# Patient Record
Sex: FEMALE | Race: WHITE | Employment: FULL TIME | URBAN - METROPOLITAN AREA
[De-identification: names, ages, dates, MRNs, and addresses within clinical notes are randomized per-mention and may not be internally consistent; named-entity substitution may affect disease eponyms.]

---

## 2018-05-23 ENCOUNTER — OFFICE VISIT (OUTPATIENT)
Dept: ORTHOPEDIC SURGERY | Age: 58
End: 2018-05-23

## 2018-05-23 VITALS
WEIGHT: 190.04 LBS | BODY MASS INDEX: 32.44 KG/M2 | HEART RATE: 89 BPM | HEIGHT: 64 IN | DIASTOLIC BLOOD PRESSURE: 101 MMHG | SYSTOLIC BLOOD PRESSURE: 187 MMHG

## 2018-05-23 DIAGNOSIS — M54.16 LUMBAR RADICULOPATHY: ICD-10-CM

## 2018-05-23 DIAGNOSIS — M54.5 LOW BACK PAIN, UNSPECIFIED BACK PAIN LATERALITY, UNSPECIFIED CHRONICITY, WITH SCIATICA PRESENCE UNSPECIFIED: Primary | ICD-10-CM

## 2018-05-23 DIAGNOSIS — M43.16 SPONDYLOLISTHESIS AT L4-L5 LEVEL: ICD-10-CM

## 2018-05-23 PROCEDURE — 99203 OFFICE O/P NEW LOW 30 MIN: CPT | Performed by: PHYSICAL MEDICINE & REHABILITATION

## 2018-05-23 RX ORDER — PREDNISONE 10 MG/1
TABLET ORAL
Qty: 26 TABLET | Refills: 0 | Status: SHIPPED | OUTPATIENT
Start: 2018-05-23 | End: 2020-08-07

## 2019-05-30 ENCOUNTER — OFFICE VISIT (OUTPATIENT)
Dept: ORTHOPEDIC SURGERY | Age: 59
End: 2019-05-30
Payer: COMMERCIAL

## 2019-05-30 VITALS
WEIGHT: 190.04 LBS | BODY MASS INDEX: 32.44 KG/M2 | DIASTOLIC BLOOD PRESSURE: 85 MMHG | HEART RATE: 79 BPM | SYSTOLIC BLOOD PRESSURE: 135 MMHG | HEIGHT: 64 IN

## 2019-05-30 DIAGNOSIS — M79.672 FOOT PAIN, LEFT: ICD-10-CM

## 2019-05-30 DIAGNOSIS — M76.72 PERONEAL TENDINITIS OF LEFT LOWER EXTREMITY: Primary | ICD-10-CM

## 2019-05-30 PROCEDURE — L4361 PNEUMA/VAC WALK BOOT PRE OTS: HCPCS | Performed by: PHYSICIAN ASSISTANT

## 2019-05-30 PROCEDURE — 99203 OFFICE O/P NEW LOW 30 MIN: CPT | Performed by: PHYSICIAN ASSISTANT

## 2019-05-30 NOTE — PROGRESS NOTES
Chief Complaint    Foot Pain (LEFT FOOT. FELL DOWN BASEMENT STEPS ABOUT 6-8 MONTHS AGO. FELT OK AT THAT POINT BUT NOW PAIN HAS INCREASED. LATERAL SIDED.)      History of Present Illness:  Hanane Aguila is a 61 y.o. femalehere for evaluation chief complaint of left lateral foot pain. Patient reports that approximately 6-8 months ago she stumbled down some steps and twisted her left ankle but really did not complain of a lot of pain or discomfort at that time. About 6-8 weeks later she began noticing pain and swelling over the lateral aspect of her foot. She works as a  at [employer] and notes pain that increases throughout the day. She also notices pain and stiffness when she first gets up in the morning. The symptoms have been getting progressively worse over the course the last several months. She's taking some Excedrin but has not had any conservative treatment. Pain Assessment  Location of Pain: Foot  Location Modifiers: Left  Severity of Pain: 5  Quality of Pain: Dull, Aching, Sharp  Duration of Pain: A few hours  Frequency of Pain: Several times daily  Aggravating Factors: Bending, Stretching, Stairs, Walking  Limiting Behavior: Some  Relieving Factors: Ice, Rest  Result of Injury: Yes  Work-Related Injury: No  Are there other pain locations you wish to document?: No]      Medical History:  Patient's medications, allergies, past medical, surgical, social and family histories were reviewed and updated as appropriate. Review of Systems:  Pertinent items are noted in HPI  Review of systems reviewed from Patient History Form dated on 5/30/2019 and available in the patient's chart under the Media tab. Vital Signs:  /85   Pulse 79   Ht 5' 4.02\" (1.626 m)   Wt 190 lb 0.6 oz (86.2 kg)   BMI 32.60 kg/m²     General Exam:   Constitutional: Patient is adequately groomed with no evidence of malnutrition  DTRs: Deep tendon reflexes are intact  Mental Status:  The patient is oriented to time, place and person. The patient's mood and affect are appropriate. Lymphatic: The lymphatic examination bilaterally reveals all areas to be without enlargement or induration. Vascular: Examination reveals no swelling or calf tenderness. Peripheral pulses are palpable and 2+. Neurological: The patient has good coordination. There is no weakness or sensory deficit. Left Foot Examination:    Inspection:  No ecchymosis or deformity. There is some swelling at the base of the fifth metatarsal.    Palpation:  Tender palpation directly over the base of the fifth metatarsal.  Nontender over the fibula or ATFL    Range of Motion:  Full range of motion of the foot and ankle    Strength:  5/5 strength throughout    Special Tests:  Negative anterior drawer and negative talar tilt    Skin: There are no rashes, ulcerations or lesions. Gait: Antalgic    Reflex 2+ and symmetric    Additional Comments:       Additional Examinations:         Right Lower Extremity: Examination of the right lower extremity does not show any tenderness, deformity or injury. Range of motion is unremarkable. There is no gross instability. There are no rashes, ulcerations or lesions. Strength and tone are normal.    Radiology:     X-rays obtained and reviewed in office:  Views 3 views including AP, lateral, oblique  Location left foot  Impression no evidence of any acute fractures or dislocations. No periosteal reaction or callus to suggest remote injury. Impression:  Encounter Diagnoses   Name Primary?  Foot pain, left     Peroneal tendinitis of left lower extremity Yes       Office Procedures:  Orders Placed This Encounter   Procedures    XR FOOT LEFT (MIN 3 VIEWS)     Standing Status:   Future     Number of Occurrences:   1     Standing Expiration Date:   6/30/2019    OTS FP Pneumatic Walking Boot Tall DJO     Patient was prescribed a Sofi Florentino Tall Walking Boot.   The left FOOT will require stabilization / immobilization from this semi-rigid / rigid orthosis to improve their function. The orthosis will assist in protecting the affected area, provide functional support and facilitate healing. The patient was educated and fit by a healthcare professional with expert knowledge and specialization in brace application while under the direct supervision of the physician. Verbal and written instructions for the use of and application of this item were provided. They were instructed to contact the office immediately should the brace result in increased pain, decreased sensation, increased swelling or worsening of the condition. Treatment Plan:  The etiology of peroneal tendinitis and it's appropriate treatment were discussed in great detail. I recommend placing her into a pneumatic walking but this evening and recommend she continue with ice. We'll also recommend oral anti-inflammatory medications over-the-counter.   I'll have her follow up in 2-3 weeks for repeat clinical exam and reevaluation

## 2019-05-31 ENCOUNTER — TELEPHONE (OUTPATIENT)
Dept: ORTHOPEDIC SURGERY | Age: 59
End: 2019-05-31

## 2019-06-11 ENCOUNTER — OFFICE VISIT (OUTPATIENT)
Dept: ORTHOPEDIC SURGERY | Age: 59
End: 2019-06-11
Payer: COMMERCIAL

## 2019-06-11 VITALS
HEART RATE: 87 BPM | HEIGHT: 64 IN | WEIGHT: 190.04 LBS | BODY MASS INDEX: 32.44 KG/M2 | DIASTOLIC BLOOD PRESSURE: 74 MMHG | SYSTOLIC BLOOD PRESSURE: 131 MMHG

## 2019-06-11 DIAGNOSIS — M79.672 LEFT FOOT PAIN: Primary | ICD-10-CM

## 2019-06-11 PROCEDURE — 20550 NJX 1 TENDON SHEATH/LIGAMENT: CPT | Performed by: PODIATRIST

## 2019-06-11 PROCEDURE — 99203 OFFICE O/P NEW LOW 30 MIN: CPT | Performed by: PODIATRIST

## 2019-06-11 NOTE — PROGRESS NOTES
HISTORY OF PRESENT ILLNESS:  This is an initial visit for a a 26-year-old female with a chief complaint of pain to the side of the left midfoot. She has had pain for about 8 months. Approximately 9 months ago she fell down some steps however at that time she did not have any foot pain after that. The pain developed without any new injury about a month after that injury. The pain is fairly sharp and described as burning at times with both activity and direct pressure. The only real relief is experienced with rest and taking off shoes. She has been immobilized in a boot for several months but that has not been helping. FAMILY HISTORY: Documented in chart. SOCIAL HISTORY: Documented in chart. REVIEW OF SYSTEMS: The patient denies any problems with cardiovascular, pulmonary, gastrointestinal, neurologic, urologic, genitourinary, psychiatric, dermatologic, and HEENT systems. Family History, Social History, and Review of Systems were reviewed from patient history form dated on 6/11/2019 and available in the patient's chart under the MEDIA tab. PHYSICAL EXAM:  The majority of the palpable tenderness is at the lateral aspect of the left mid foot, over the 5th metatarsal base. There is minimal pain of the peroneal tendon at the posterior lateral aspect of the ankle, bilateral.  There is full strength with eversion and plantarflexion of the foot, bilateral.  There is no pain with palpation to the Achilles tendon, bilateral.  There is full-strength with plantar flexion at the ankle. This is symmetrical.    Pedal pulses are palpable, bilateral.  The sensation is grossly intact, bilateral.    X-RAYS: 3 nonweightbearing x-ray views of the left foot were reviewed. No acute fracture or periosteal reaction is noted on either side. ASSESSMENT: Peroneal Tendinitis, left    PLAN:  I educated the patient on the pathology and its treatment options.     I injected a mixture of 1 mL of 0.5% bupivacaine plain and 0.5mL of Celestone (6mg/ml) via a lateral approach to the peritenon around the insertion of the peroneus brevis tendon. The area of injection was sterilely prepped prior to the injection. The patient tolerated the injection very well. I advised to the patient to rest and ice the area for the next 48 hours. The potential for a steroid flare reaction was discussed. She will use the boot for at least another week. A gradual return to activity and regular shoe gear can be done if symptoms subside. I will see her back in 3 weeks.

## 2020-06-22 ENCOUNTER — TELEPHONE (OUTPATIENT)
Dept: FAMILY MEDICINE CLINIC | Age: 60
End: 2020-06-22

## 2020-08-05 ENCOUNTER — OFFICE VISIT (OUTPATIENT)
Dept: FAMILY MEDICINE CLINIC | Age: 60
End: 2020-08-05
Payer: COMMERCIAL

## 2020-08-05 VITALS
BODY MASS INDEX: 34.83 KG/M2 | OXYGEN SATURATION: 97 % | SYSTOLIC BLOOD PRESSURE: 200 MMHG | TEMPERATURE: 98 F | WEIGHT: 203 LBS | DIASTOLIC BLOOD PRESSURE: 120 MMHG | HEART RATE: 88 BPM

## 2020-08-05 PROCEDURE — 99406 BEHAV CHNG SMOKING 3-10 MIN: CPT | Performed by: FAMILY MEDICINE

## 2020-08-05 PROCEDURE — 99204 OFFICE O/P NEW MOD 45 MIN: CPT | Performed by: FAMILY MEDICINE

## 2020-08-05 RX ORDER — EPINEPHRINE 0.3 MG/.3ML
0.3 INJECTION SUBCUTANEOUS ONCE
Qty: 0.3 ML | Refills: 1 | Status: SHIPPED | OUTPATIENT
Start: 2020-08-05 | End: 2021-01-01

## 2020-08-05 RX ORDER — HYDROCHLOROTHIAZIDE 25 MG/1
25 TABLET ORAL EVERY MORNING
Qty: 30 TABLET | Refills: 3 | Status: SHIPPED | OUTPATIENT
Start: 2020-08-05 | End: 2021-01-01 | Stop reason: SDDI

## 2020-08-05 ASSESSMENT — PATIENT HEALTH QUESTIONNAIRE - PHQ9
SUM OF ALL RESPONSES TO PHQ9 QUESTIONS 1 & 2: 2
2. FEELING DOWN, DEPRESSED OR HOPELESS: 1
1. LITTLE INTEREST OR PLEASURE IN DOING THINGS: 1
SUM OF ALL RESPONSES TO PHQ QUESTIONS 1-9: 2
SUM OF ALL RESPONSES TO PHQ QUESTIONS 1-9: 2

## 2020-08-05 ASSESSMENT — ENCOUNTER SYMPTOMS
CHEST TIGHTNESS: 0
SHORTNESS OF BREATH: 0

## 2020-08-05 NOTE — PROGRESS NOTES
2020    Reid Silver (:  1960) veena 61 y.o. female, here for evaluation of the following:    CC: tobacco abuse    HPI    Tobacco abuse  1/2 ppd    HTN  Above goal, not currently not on anything, has been 5 years since seeing a doctor    Obesity  BMI of 34    Health maint  Hasn't done mammo, colonoscopy, pap, or LDCT    Review of Systems   Constitutional: Negative for fever. Eyes: Negative for visual disturbance. Respiratory: Negative for chest tightness and shortness of breath. Cardiovascular: Negative for chest pain. All other systems reviewed and negative    Prior to Visit Medications    Medication Sig Taking?  Authorizing Provider   hydroCHLOROthiazide (HYDRODIURIL) 25 MG tablet Take 1 tablet by mouth every morning Yes Jonathan Argueta MD   EPINEPHrine (EPIPEN 2-LEONA) 0.3 MG/0.3ML SOAJ injection Inject 0.3 mLs into the muscle once for 1 dose Use as directed for allergic reaction Yes Jonathan Argueta MD   predniSONE (DELTASONE) 10 MG tablet SIG: iii po BID x 2 days then ii po BID x 2 days then i po BID x 2 days then i po qd x 2 days  F Jose Rodriguez MD   omeprazole (PRILOSEC) 20 MG capsule Take 20 mg by mouth daily  Historical Provider, MD   lisinopril (PRINIVIL;ZESTRIL) 20 MG tablet Take 20 mg by mouth daily  Historical Provider, MD   EPINEPHrine (EPIPEN) 0.3 MG/0.3ML THANG injection Use as directed for allergic reaction  Yanira Coello PA-C        Allergies   Allergen Reactions    Bee Pollen Swelling    Varenicline Nausea And Vomiting       Past Medical History:   Diagnosis Date    Hypertension        Past Surgical History:   Procedure Laterality Date    CHOLECYSTECTOMY      HYSTERECTOMY         Social History     Socioeconomic History    Marital status: Single     Spouse name: Not on file    Number of children: Not on file    Years of education: Not on file    Highest education level: Not on file   Occupational History    Not on file   Social Needs    Financial resource strain: Not on file    Food insecurity     Worry: Not on file     Inability: Not on file    Transportation needs     Medical: Not on file     Non-medical: Not on file   Tobacco Use    Smoking status: Current Every Day Smoker     Packs/day: 0.50     Types: Cigarettes     Start date: 36    Smokeless tobacco: Never Used   Substance and Sexual Activity    Alcohol use: No    Drug use: No    Sexual activity: Not on file   Lifestyle    Physical activity     Days per week: Not on file     Minutes per session: Not on file    Stress: Not on file   Relationships    Social connections     Talks on phone: Not on file     Gets together: Not on file     Attends Confucianism service: Not on file     Active member of club or organization: Not on file     Attends meetings of clubs or organizations: Not on file     Relationship status: Not on file    Intimate partner violence     Fear of current or ex partner: Not on file     Emotionally abused: Not on file     Physically abused: Not on file     Forced sexual activity: Not on file   Other Topics Concern    Not on file   Social History Narrative    Not on file        History reviewed. No pertinent family history. Vitals:    08/05/20 1324   BP: (!) 200/120   Pulse: 88   Temp: 98 °F (36.7 °C)   TempSrc: Temporal   SpO2: 97%   Weight: 203 lb (92.1 kg)     Estimated body mass index is 34.83 kg/m² as calculated from the following:    Height as of 6/11/19: 5' 4.02\" (1.626 m). Weight as of this encounter: 203 lb (92.1 kg). Physical Exam  Neurological:      General: No focal deficit present. Cranial Nerves: No cranial nerve deficit. Sensory: No sensory deficit. Motor: No weakness.       Deep Tendon Reflexes: Reflexes normal.     Constitutional: Patient appears well-developed and well-nourished   Ears, Nose, Mouth & Throat: external inspection of ears and nose show no scars, lesions or masses, pt can hear finger rub bilaterally  Eyes: Conjunctivae and pupils are normal in appearance   Neck: neck is supple. No thyromegaly present   Cardiovascular: Normal rate. No lower ext edema present  Respiratory: Effort normal and breath sounds normal. No respiratory distress. No W/R/R   Gastrointestinal: Soft. There is no tenderness. No hernias  Musculoskeletal: Normal range of motion of extremities, normal gait  Psychiatric: judgment and insight appropriate for age, no agitation  Skin: Skin is warm and dry     ASSESSMENT/PLAN:  Yandy Layton was seen today for established new doctor and numbness. Diagnoses and all orders for this visit:    Tobacco abuse  Tobacco Abuse: 1/2 ppd. Pt counseled on risks of tobacco use for 3 min and urged to quit. Patient in pre-contemplative stage. Will discuss with patient on future visit    Essential hypertension  Uncontrolled, asymmptomtic, pt to start HCTZ today and schedule appt for 2 days from now to check BP, If symptoms worsen or fail to improve the patient will contact office  -     hydroCHLOROthiazide (HYDRODIURIL) 25 MG tablet; Take 1 tablet by mouth every morning    Obesity, unspecified classification, unspecified obesity type, unspecified whether serious comorbidity present  Diet, exercise    Healthcare maintenance  Declines all health screenings, immunizations, etc  -     Comprehensive Metabolic Panel  -     Hemoglobin A1C  -     Lipid Panel    Return in about 2 days (around 8/7/2020) for bp check. An  electronic signature was used to authenticate this note.     --Luis Miguel Larsen MD on 8/5/2020 at 1:58 PM

## 2020-08-06 LAB
A/G RATIO: 1.4 (ref 1.1–2.2)
ALBUMIN SERPL-MCNC: 4 G/DL (ref 3.4–5)
ALP BLD-CCNC: 110 U/L (ref 40–129)
ALT SERPL-CCNC: 8 U/L (ref 10–40)
ANION GAP SERPL CALCULATED.3IONS-SCNC: 18 MMOL/L (ref 3–16)
AST SERPL-CCNC: 16 U/L (ref 15–37)
BILIRUB SERPL-MCNC: 0.4 MG/DL (ref 0–1)
BUN BLDV-MCNC: 8 MG/DL (ref 7–20)
CALCIUM SERPL-MCNC: 9 MG/DL (ref 8.3–10.6)
CHLORIDE BLD-SCNC: 98 MMOL/L (ref 99–110)
CHOLESTEROL, TOTAL: 201 MG/DL (ref 0–199)
CO2: 27 MMOL/L (ref 21–32)
CREAT SERPL-MCNC: 0.9 MG/DL (ref 0.6–1.2)
ESTIMATED AVERAGE GLUCOSE: 122.6 MG/DL
GFR AFRICAN AMERICAN: >60
GFR NON-AFRICAN AMERICAN: >60
GLOBULIN: 2.9 G/DL
GLUCOSE BLD-MCNC: 130 MG/DL (ref 70–99)
HBA1C MFR BLD: 5.9 %
HDLC SERPL-MCNC: 35 MG/DL (ref 40–60)
LDL CHOLESTEROL CALCULATED: 124 MG/DL
POTASSIUM SERPL-SCNC: 3.2 MMOL/L (ref 3.5–5.1)
SODIUM BLD-SCNC: 143 MMOL/L (ref 136–145)
TOTAL PROTEIN: 6.9 G/DL (ref 6.4–8.2)
TRIGL SERPL-MCNC: 212 MG/DL (ref 0–150)
VLDLC SERPL CALC-MCNC: 42 MG/DL

## 2020-08-07 ENCOUNTER — OFFICE VISIT (OUTPATIENT)
Dept: FAMILY MEDICINE CLINIC | Age: 60
End: 2020-08-07
Payer: COMMERCIAL

## 2020-08-07 VITALS
RESPIRATION RATE: 16 BRPM | DIASTOLIC BLOOD PRESSURE: 88 MMHG | HEIGHT: 64 IN | SYSTOLIC BLOOD PRESSURE: 182 MMHG | HEART RATE: 94 BPM | TEMPERATURE: 98.1 F | WEIGHT: 204.6 LBS | OXYGEN SATURATION: 97 % | BODY MASS INDEX: 34.93 KG/M2

## 2020-08-07 PROCEDURE — 99214 OFFICE O/P EST MOD 30 MIN: CPT | Performed by: FAMILY MEDICINE

## 2020-08-07 RX ORDER — LISINOPRIL 20 MG/1
20 TABLET ORAL DAILY
Qty: 30 TABLET | Refills: 0 | Status: SHIPPED | OUTPATIENT
Start: 2020-08-07 | End: 2020-08-07

## 2020-08-07 RX ORDER — CYCLOBENZAPRINE HCL 5 MG
5 TABLET ORAL NIGHTLY PRN
Qty: 20 TABLET | Refills: 0 | Status: SHIPPED | OUTPATIENT
Start: 2020-08-07 | End: 2020-08-27

## 2020-08-07 RX ORDER — LISINOPRIL 20 MG/1
20 TABLET ORAL DAILY
Qty: 30 TABLET | Refills: 2 | Status: SHIPPED | OUTPATIENT
Start: 2020-08-07 | End: 2021-01-01 | Stop reason: SDDI

## 2020-08-07 ASSESSMENT — ENCOUNTER SYMPTOMS: SHORTNESS OF BREATH: 0

## 2020-08-07 NOTE — PROGRESS NOTES
(1.626 m)   Wt 204 lb 9.6 oz (92.8 kg)   SpO2 97%   BMI 35.12 kg/m²      Physical Exam  Vitals signs reviewed. Constitutional:       Appearance: Normal appearance. HENT:      Head: Normocephalic and atraumatic. Eyes:      Conjunctiva/sclera: Conjunctivae normal.   Cardiovascular:      Rate and Rhythm: Normal rate and regular rhythm. Heart sounds: Normal heart sounds. Pulmonary:      Effort: Pulmonary effort is normal.      Breath sounds: Normal breath sounds. Musculoskeletal:      Comments: No tenderness to palpation thoracic spine. Tenderness palpation muscular area just beneath the right scapula. Skin:     General: Skin is warm and dry. Neurological:      General: No focal deficit present. Mental Status: She is alert and oriented to person, place, and time. ASSESSMENT:   Well Adult, See encounter diagnoses  Dwayne Quintana was seen today for blood pressure check. Diagnoses and all orders for this visit:    Essential hypertension  Advised patient to continue the hydrochlorothiazide 5 blood pressure monitor and if blood pressure is still greater than 140/90 on Sunday to go ahead and start lisinopril 20 mg. Take both medications and follow-up in 1 month with her PCP. Monitor blood pressures and notify us if greater than 140/90 sooner. Patient may need 24-hour blood pressure monitor to evaluate for whitecoat hypertension. Reviewed red flags with patient if they occur advised to go to the ER.  -     lisinopril (PRINIVIL;ZESTRIL) 20 MG tablet; Take 1 tablet by mouth daily    Muscle spasm  Continue with ice as needed can also use topical creams such as icy hot or salonpas  -     cyclobenzaprine (FLEXERIL) 5 MG tablet; Take 1 tablet by mouth nightly as needed for Muscle spasms          Plan:   See orders and medications filed with this encounter. Return in about 4 weeks (around 9/4/2020).

## 2021-01-01 ENCOUNTER — OFFICE VISIT (OUTPATIENT)
Dept: FAMILY MEDICINE CLINIC | Age: 61
End: 2021-01-01
Payer: COMMERCIAL

## 2021-01-01 ENCOUNTER — OFFICE VISIT (OUTPATIENT)
Dept: CARDIOLOGY CLINIC | Age: 61
End: 2021-01-01
Payer: COMMERCIAL

## 2021-01-01 ENCOUNTER — HOSPITAL ENCOUNTER (INPATIENT)
Age: 61
LOS: 3 days | Discharge: HOME | DRG: 304 | End: 2021-03-04
Attending: EMERGENCY MEDICINE | Admitting: HOSPITALIST
Payer: COMMERCIAL

## 2021-01-01 ENCOUNTER — TELEPHONE (OUTPATIENT)
Dept: CARDIOLOGY CLINIC | Age: 61
End: 2021-01-01

## 2021-01-01 ENCOUNTER — NURSE TRIAGE (OUTPATIENT)
Dept: OTHER | Facility: CLINIC | Age: 61
End: 2021-01-01

## 2021-01-01 ENCOUNTER — TELEPHONE (OUTPATIENT)
Dept: FAMILY MEDICINE CLINIC | Age: 61
End: 2021-01-01

## 2021-01-01 ENCOUNTER — APPOINTMENT (OUTPATIENT)
Dept: GENERAL RADIOLOGY | Age: 61
DRG: 045 | End: 2021-01-01
Payer: MEDICAID

## 2021-01-01 ENCOUNTER — APPOINTMENT (OUTPATIENT)
Dept: GENERAL RADIOLOGY | Age: 61
DRG: 052 | End: 2021-01-01
Attending: INTERNAL MEDICINE
Payer: MEDICAID

## 2021-01-01 ENCOUNTER — APPOINTMENT (OUTPATIENT)
Dept: GENERAL RADIOLOGY | Age: 61
DRG: 304 | End: 2021-01-01
Payer: COMMERCIAL

## 2021-01-01 ENCOUNTER — HOSPITAL ENCOUNTER (INPATIENT)
Age: 61
LOS: 6 days | DRG: 052 | End: 2021-10-17
Attending: INTERNAL MEDICINE | Admitting: INTERNAL MEDICINE
Payer: MEDICAID

## 2021-01-01 ENCOUNTER — APPOINTMENT (OUTPATIENT)
Dept: CT IMAGING | Age: 61
DRG: 045 | End: 2021-01-01
Payer: MEDICAID

## 2021-01-01 ENCOUNTER — HOSPITAL ENCOUNTER (INPATIENT)
Age: 61
LOS: 9 days | Discharge: ANOTHER HOSPITAL | DRG: 045 | End: 2021-10-11
Attending: EMERGENCY MEDICINE | Admitting: INTERNAL MEDICINE
Payer: MEDICAID

## 2021-01-01 ENCOUNTER — APPOINTMENT (OUTPATIENT)
Dept: MRI IMAGING | Age: 61
DRG: 045 | End: 2021-01-01
Payer: MEDICAID

## 2021-01-01 ENCOUNTER — APPOINTMENT (OUTPATIENT)
Dept: MRI IMAGING | Age: 61
DRG: 304 | End: 2021-01-01
Payer: COMMERCIAL

## 2021-01-01 ENCOUNTER — APPOINTMENT (OUTPATIENT)
Dept: CT IMAGING | Age: 61
DRG: 304 | End: 2021-01-01
Payer: COMMERCIAL

## 2021-01-01 ENCOUNTER — APPOINTMENT (OUTPATIENT)
Dept: CT IMAGING | Age: 61
DRG: 052 | End: 2021-01-01
Attending: INTERNAL MEDICINE
Payer: MEDICAID

## 2021-01-01 ENCOUNTER — APPOINTMENT (OUTPATIENT)
Dept: NUCLEAR MEDICINE | Age: 61
DRG: 304 | End: 2021-01-01
Payer: COMMERCIAL

## 2021-01-01 ENCOUNTER — APPOINTMENT (OUTPATIENT)
Dept: VASCULAR LAB | Age: 61
DRG: 304 | End: 2021-01-01
Payer: COMMERCIAL

## 2021-01-01 ENCOUNTER — APPOINTMENT (OUTPATIENT)
Dept: MRI IMAGING | Age: 61
DRG: 052 | End: 2021-01-01
Attending: INTERNAL MEDICINE
Payer: MEDICAID

## 2021-01-01 VITALS
BODY MASS INDEX: 33.2 KG/M2 | HEART RATE: 73 BPM | OXYGEN SATURATION: 98 % | DIASTOLIC BLOOD PRESSURE: 76 MMHG | WEIGHT: 193.4 LBS | SYSTOLIC BLOOD PRESSURE: 138 MMHG | TEMPERATURE: 97.8 F

## 2021-01-01 VITALS
RESPIRATION RATE: 16 BRPM | OXYGEN SATURATION: 98 % | HEART RATE: 58 BPM | TEMPERATURE: 97.8 F | SYSTOLIC BLOOD PRESSURE: 153 MMHG | BODY MASS INDEX: 33.29 KG/M2 | HEIGHT: 64 IN | DIASTOLIC BLOOD PRESSURE: 70 MMHG | WEIGHT: 195 LBS

## 2021-01-01 VITALS
HEART RATE: 74 BPM | OXYGEN SATURATION: 98 % | WEIGHT: 187 LBS | HEIGHT: 64 IN | BODY MASS INDEX: 31.92 KG/M2 | SYSTOLIC BLOOD PRESSURE: 152 MMHG | DIASTOLIC BLOOD PRESSURE: 78 MMHG

## 2021-01-01 VITALS
BODY MASS INDEX: 33.47 KG/M2 | DIASTOLIC BLOOD PRESSURE: 144 MMHG | WEIGHT: 195 LBS | OXYGEN SATURATION: 98 % | SYSTOLIC BLOOD PRESSURE: 266 MMHG | HEART RATE: 95 BPM | TEMPERATURE: 97.3 F

## 2021-01-01 VITALS
HEIGHT: 64 IN | DIASTOLIC BLOOD PRESSURE: 76 MMHG | BODY MASS INDEX: 32.69 KG/M2 | HEART RATE: 83 BPM | OXYGEN SATURATION: 93 % | SYSTOLIC BLOOD PRESSURE: 134 MMHG | WEIGHT: 191.5 LBS

## 2021-01-01 VITALS
BODY MASS INDEX: 33.29 KG/M2 | SYSTOLIC BLOOD PRESSURE: 131 MMHG | TEMPERATURE: 97.7 F | HEIGHT: 64 IN | RESPIRATION RATE: 18 BRPM | HEART RATE: 71 BPM | WEIGHT: 195 LBS | DIASTOLIC BLOOD PRESSURE: 74 MMHG | OXYGEN SATURATION: 99 %

## 2021-01-01 VITALS
TEMPERATURE: 97.5 F | WEIGHT: 189 LBS | OXYGEN SATURATION: 98 % | DIASTOLIC BLOOD PRESSURE: 80 MMHG | BODY MASS INDEX: 32.44 KG/M2 | HEART RATE: 75 BPM | SYSTOLIC BLOOD PRESSURE: 138 MMHG

## 2021-01-01 VITALS
OXYGEN SATURATION: 100 % | BODY MASS INDEX: 27.74 KG/M2 | RESPIRATION RATE: 32 BRPM | SYSTOLIC BLOOD PRESSURE: 82 MMHG | WEIGHT: 162.48 LBS | HEART RATE: 89 BPM | TEMPERATURE: 98.4 F | DIASTOLIC BLOOD PRESSURE: 74 MMHG | HEIGHT: 64 IN

## 2021-01-01 VITALS
OXYGEN SATURATION: 98 % | BODY MASS INDEX: 32.68 KG/M2 | SYSTOLIC BLOOD PRESSURE: 136 MMHG | WEIGHT: 190.4 LBS | DIASTOLIC BLOOD PRESSURE: 70 MMHG | HEART RATE: 70 BPM

## 2021-01-01 VITALS
DIASTOLIC BLOOD PRESSURE: 80 MMHG | SYSTOLIC BLOOD PRESSURE: 140 MMHG | HEART RATE: 73 BPM | WEIGHT: 196 LBS | TEMPERATURE: 96.9 F | BODY MASS INDEX: 33.64 KG/M2 | OXYGEN SATURATION: 96 %

## 2021-01-01 DIAGNOSIS — I16.0 HYPERTENSIVE URGENCY, MALIGNANT: ICD-10-CM

## 2021-01-01 DIAGNOSIS — I10 HTN (HYPERTENSION), BENIGN: ICD-10-CM

## 2021-01-01 DIAGNOSIS — Z12.39 ENCOUNTER FOR SCREENING FOR MALIGNANT NEOPLASM OF BREAST, UNSPECIFIED SCREENING MODALITY: ICD-10-CM

## 2021-01-01 DIAGNOSIS — G93.40 ENCEPHALOPATHY: ICD-10-CM

## 2021-01-01 DIAGNOSIS — R55 SYNCOPE AND COLLAPSE: ICD-10-CM

## 2021-01-01 DIAGNOSIS — I63.9 CEREBROVASCULAR ACCIDENT (CVA), UNSPECIFIED MECHANISM (HCC): Primary | ICD-10-CM

## 2021-01-01 DIAGNOSIS — I10 UNCONTROLLED HYPERTENSION: ICD-10-CM

## 2021-01-01 DIAGNOSIS — F17.200 SMOKER: ICD-10-CM

## 2021-01-01 DIAGNOSIS — E04.1 THYROID NODULE: ICD-10-CM

## 2021-01-01 DIAGNOSIS — Z72.0 TOBACCO ABUSE: ICD-10-CM

## 2021-01-01 DIAGNOSIS — I63.9 CEREBROVASCULAR ACCIDENT (CVA), UNSPECIFIED MECHANISM (HCC): ICD-10-CM

## 2021-01-01 DIAGNOSIS — I10 HTN (HYPERTENSION), BENIGN: Primary | ICD-10-CM

## 2021-01-01 DIAGNOSIS — I63.9 ACUTE CVA (CEREBROVASCULAR ACCIDENT) (HCC): Primary | ICD-10-CM

## 2021-01-01 DIAGNOSIS — I16.0 HYPERTENSIVE URGENCY: ICD-10-CM

## 2021-01-01 DIAGNOSIS — E87.6 HYPOKALEMIA: ICD-10-CM

## 2021-01-01 DIAGNOSIS — R42 DIZZINESS: ICD-10-CM

## 2021-01-01 DIAGNOSIS — H53.452 PERIPHERAL VISUAL FIELD DEFECT, LEFT: ICD-10-CM

## 2021-01-01 DIAGNOSIS — H90.0 CONDUCTIVE HEARING LOSS, BILATERAL: ICD-10-CM

## 2021-01-01 DIAGNOSIS — I16.1 HYPERTENSIVE EMERGENCY: ICD-10-CM

## 2021-01-01 DIAGNOSIS — R41.3 MEMORY DIFFICULTY: ICD-10-CM

## 2021-01-01 DIAGNOSIS — R55 SYNCOPE, UNSPECIFIED SYNCOPE TYPE: ICD-10-CM

## 2021-01-01 DIAGNOSIS — I16.0 HYPERTENSIVE URGENCY: Primary | ICD-10-CM

## 2021-01-01 DIAGNOSIS — R20.2 PARESTHESIA: ICD-10-CM

## 2021-01-01 DIAGNOSIS — H61.23 BILATERAL IMPACTED CERUMEN: ICD-10-CM

## 2021-01-01 DIAGNOSIS — E83.42 HYPOMAGNESEMIA: ICD-10-CM

## 2021-01-01 DIAGNOSIS — R42 DIZZINESS: Primary | ICD-10-CM

## 2021-01-01 DIAGNOSIS — I44.1 2ND DEGREE ATRIOVENTRICULAR BLOCK: ICD-10-CM

## 2021-01-01 DIAGNOSIS — E78.5 DYSLIPIDEMIA: ICD-10-CM

## 2021-01-01 DIAGNOSIS — G93.5 BRAIN HERNIATION (HCC): ICD-10-CM

## 2021-01-01 DIAGNOSIS — I63.511 CEREBROVASCULAR ACCIDENT (CVA) DUE TO OCCLUSION OF RIGHT MIDDLE CEREBRAL ARTERY (HCC): Primary | ICD-10-CM

## 2021-01-01 DIAGNOSIS — R06.02 SOB (SHORTNESS OF BREATH): ICD-10-CM

## 2021-01-01 DIAGNOSIS — I10 ESSENTIAL HYPERTENSION: ICD-10-CM

## 2021-01-01 DIAGNOSIS — I10 UNCONTROLLED HYPERTENSION: Primary | ICD-10-CM

## 2021-01-01 DIAGNOSIS — K21.9 GASTROESOPHAGEAL REFLUX DISEASE WITHOUT ESOPHAGITIS: ICD-10-CM

## 2021-01-01 DIAGNOSIS — E78.2 MIXED HYPERLIPIDEMIA: ICD-10-CM

## 2021-01-01 DIAGNOSIS — R51.9 GENERALIZED HEADACHE: ICD-10-CM

## 2021-01-01 LAB
A/G RATIO: 1.1 (ref 1.1–2.2)
A/G RATIO: 1.2 (ref 1.1–2.2)
A/G RATIO: 1.3 (ref 1.1–2.2)
ALBUMIN SERPL-MCNC: 3.5 G/DL (ref 3.4–5)
ALBUMIN SERPL-MCNC: 3.7 G/DL (ref 3.4–5)
ALBUMIN SERPL-MCNC: 3.8 G/DL (ref 3.4–5)
ALBUMIN SERPL-MCNC: 3.8 G/DL (ref 3.4–5)
ALBUMIN SERPL-MCNC: 4.1 G/DL (ref 3.4–5)
ALBUMIN SERPL-MCNC: 4.2 G/DL (ref 3.4–5)
ALBUMIN SERPL-MCNC: 4.5 G/DL (ref 3.4–5)
ALDOSTERONE: <3 NG/DL
ALP BLD-CCNC: 110 U/L (ref 40–129)
ALP BLD-CCNC: 79 U/L (ref 40–129)
ALP BLD-CCNC: 87 U/L (ref 40–129)
ALP BLD-CCNC: 87 U/L (ref 40–129)
ALP BLD-CCNC: 88 U/L (ref 40–129)
ALT SERPL-CCNC: 7 U/L (ref 10–40)
ALT SERPL-CCNC: 7 U/L (ref 10–40)
ALT SERPL-CCNC: <5 U/L (ref 10–40)
AMPHETAMINE SCREEN, URINE: ABNORMAL
AMPHETAMINE SCREEN, URINE: ABNORMAL
ANION GAP SERPL CALCULATED.3IONS-SCNC: 10 MMOL/L (ref 3–16)
ANION GAP SERPL CALCULATED.3IONS-SCNC: 11 MMOL/L (ref 3–16)
ANION GAP SERPL CALCULATED.3IONS-SCNC: 11 MMOL/L (ref 3–16)
ANION GAP SERPL CALCULATED.3IONS-SCNC: 12 MMOL/L (ref 3–16)
ANION GAP SERPL CALCULATED.3IONS-SCNC: 13 MMOL/L (ref 3–16)
ANION GAP SERPL CALCULATED.3IONS-SCNC: 13 MMOL/L (ref 3–16)
ANION GAP SERPL CALCULATED.3IONS-SCNC: 14 MMOL/L (ref 3–16)
ANION GAP SERPL CALCULATED.3IONS-SCNC: 15 MMOL/L (ref 3–16)
ANION GAP SERPL CALCULATED.3IONS-SCNC: 15 MMOL/L (ref 3–16)
ANION GAP SERPL CALCULATED.3IONS-SCNC: 16 MMOL/L (ref 3–16)
ANION GAP SERPL CALCULATED.3IONS-SCNC: 8 MMOL/L (ref 3–16)
ANION GAP SERPL CALCULATED.3IONS-SCNC: 9 MMOL/L (ref 3–16)
AST SERPL-CCNC: 14 U/L (ref 15–37)
AST SERPL-CCNC: 15 U/L (ref 15–37)
AST SERPL-CCNC: 17 U/L (ref 15–37)
AST SERPL-CCNC: 17 U/L (ref 15–37)
AST SERPL-CCNC: 9 U/L (ref 15–37)
BACTERIA: ABNORMAL /HPF
BARBITURATE SCREEN URINE: ABNORMAL
BARBITURATE SCREEN URINE: ABNORMAL
BASE EXCESS ARTERIAL: -7.8 MMOL/L (ref -3–3)
BASOPHILS ABSOLUTE: 0 K/UL (ref 0–0.2)
BASOPHILS ABSOLUTE: 0.1 K/UL (ref 0–0.2)
BASOPHILS ABSOLUTE: 0.2 K/UL (ref 0–0.2)
BASOPHILS RELATIVE PERCENT: 0.1 %
BASOPHILS RELATIVE PERCENT: 0.2 %
BASOPHILS RELATIVE PERCENT: 0.2 %
BASOPHILS RELATIVE PERCENT: 0.3 %
BASOPHILS RELATIVE PERCENT: 0.4 %
BASOPHILS RELATIVE PERCENT: 0.5 %
BASOPHILS RELATIVE PERCENT: 0.5 %
BASOPHILS RELATIVE PERCENT: 1 %
BASOPHILS RELATIVE PERCENT: 1.1 %
BASOPHILS RELATIVE PERCENT: 1.1 %
BENZODIAZEPINE SCREEN, URINE: ABNORMAL
BENZODIAZEPINE SCREEN, URINE: ABNORMAL
BILIRUB SERPL-MCNC: 0.9 MG/DL (ref 0–1)
BILIRUB SERPL-MCNC: 0.9 MG/DL (ref 0–1)
BILIRUB SERPL-MCNC: 1.1 MG/DL (ref 0–1)
BILIRUB SERPL-MCNC: 1.2 MG/DL (ref 0–1)
BILIRUB SERPL-MCNC: 1.5 MG/DL (ref 0–1)
BILIRUBIN URINE: ABNORMAL
BILIRUBIN URINE: NEGATIVE
BLOOD CULTURE, ROUTINE: NORMAL
BLOOD, URINE: ABNORMAL
BLOOD, URINE: ABNORMAL
BLOOD, URINE: NEGATIVE
BUN BLDV-MCNC: 11 MG/DL (ref 7–20)
BUN BLDV-MCNC: 12 MG/DL (ref 7–20)
BUN BLDV-MCNC: 30 MG/DL (ref 7–20)
BUN BLDV-MCNC: 30 MG/DL (ref 7–20)
BUN BLDV-MCNC: 34 MG/DL (ref 7–20)
BUN BLDV-MCNC: 35 MG/DL (ref 7–20)
BUN BLDV-MCNC: 40 MG/DL (ref 7–20)
BUN BLDV-MCNC: 43 MG/DL (ref 7–20)
BUN BLDV-MCNC: 45 MG/DL (ref 7–20)
BUN BLDV-MCNC: 5 MG/DL (ref 7–20)
BUN BLDV-MCNC: 52 MG/DL (ref 7–20)
BUN BLDV-MCNC: 52 MG/DL (ref 7–20)
BUN BLDV-MCNC: 6 MG/DL (ref 7–20)
BUN BLDV-MCNC: 6 MG/DL (ref 7–20)
BUN BLDV-MCNC: 7 MG/DL (ref 7–20)
BUN BLDV-MCNC: 74 MG/DL (ref 7–20)
BUN BLDV-MCNC: 8 MG/DL (ref 7–20)
CALCIUM SERPL-MCNC: 8.4 MG/DL (ref 8.3–10.6)
CALCIUM SERPL-MCNC: 8.6 MG/DL (ref 8.3–10.6)
CALCIUM SERPL-MCNC: 8.8 MG/DL (ref 8.3–10.6)
CALCIUM SERPL-MCNC: 8.9 MG/DL (ref 8.3–10.6)
CALCIUM SERPL-MCNC: 9 MG/DL (ref 8.3–10.6)
CALCIUM SERPL-MCNC: 9.1 MG/DL (ref 8.3–10.6)
CALCIUM SERPL-MCNC: 9.2 MG/DL (ref 8.3–10.6)
CALCIUM SERPL-MCNC: 9.4 MG/DL (ref 8.3–10.6)
CALCIUM SERPL-MCNC: 9.5 MG/DL (ref 8.3–10.6)
CALCIUM SERPL-MCNC: 9.6 MG/DL (ref 8.3–10.6)
CALCIUM SERPL-MCNC: 9.7 MG/DL (ref 8.3–10.6)
CALCIUM SERPL-MCNC: 9.7 MG/DL (ref 8.3–10.6)
CANNABINOID SCREEN URINE: POSITIVE
CANNABINOID SCREEN URINE: POSITIVE
CARBOXYHEMOGLOBIN ARTERIAL: 0.8 % (ref 0–1.5)
CHLORIDE BLD-SCNC: 100 MMOL/L (ref 99–110)
CHLORIDE BLD-SCNC: 102 MMOL/L (ref 99–110)
CHLORIDE BLD-SCNC: 102 MMOL/L (ref 99–110)
CHLORIDE BLD-SCNC: 104 MMOL/L (ref 99–110)
CHLORIDE BLD-SCNC: 124 MMOL/L (ref 99–110)
CHLORIDE BLD-SCNC: 129 MMOL/L (ref 99–110)
CHLORIDE BLD-SCNC: 131 MMOL/L (ref 99–110)
CHLORIDE BLD-SCNC: 133 MMOL/L (ref 99–110)
CHLORIDE BLD-SCNC: 137 MMOL/L (ref 99–110)
CHLORIDE BLD-SCNC: 94 MMOL/L (ref 99–110)
CHLORIDE BLD-SCNC: 94 MMOL/L (ref 99–110)
CHLORIDE BLD-SCNC: 95 MMOL/L (ref 99–110)
CHLORIDE BLD-SCNC: 97 MMOL/L (ref 99–110)
CHLORIDE BLD-SCNC: 99 MMOL/L (ref 99–110)
CHOLESTEROL, TOTAL: 165 MG/DL (ref 0–199)
CHOLESTEROL, TOTAL: 177 MG/DL (ref 0–199)
CLARITY: CLEAR
CO2: 18 MMOL/L (ref 21–32)
CO2: 20 MMOL/L (ref 21–32)
CO2: 21 MMOL/L (ref 21–32)
CO2: 21 MMOL/L (ref 21–32)
CO2: 22 MMOL/L (ref 21–32)
CO2: 23 MMOL/L (ref 21–32)
CO2: 26 MMOL/L (ref 21–32)
CO2: 27 MMOL/L (ref 21–32)
CO2: 29 MMOL/L (ref 21–32)
CO2: 30 MMOL/L (ref 21–32)
CO2: 30 MMOL/L (ref 21–32)
CO2: 31 MMOL/L (ref 21–32)
CO2: 34 MMOL/L (ref 21–32)
COCAINE METABOLITE SCREEN URINE: ABNORMAL
COCAINE METABOLITE SCREEN URINE: ABNORMAL
COLOR: YELLOW
CREAT SERPL-MCNC: 0.6 MG/DL (ref 0.6–1.2)
CREAT SERPL-MCNC: 0.7 MG/DL (ref 0.6–1.2)
CREAT SERPL-MCNC: 0.8 MG/DL (ref 0.6–1.2)
CREAT SERPL-MCNC: 0.9 MG/DL (ref 0.6–1.2)
CREAT SERPL-MCNC: 1 MG/DL (ref 0.6–1.2)
CREAT SERPL-MCNC: 1 MG/DL (ref 0.6–1.2)
CREAT SERPL-MCNC: 1.1 MG/DL (ref 0.6–1.2)
CREAT SERPL-MCNC: 1.2 MG/DL (ref 0.6–1.2)
CREAT SERPL-MCNC: 1.3 MG/DL (ref 0.6–1.2)
CREAT SERPL-MCNC: 1.8 MG/DL (ref 0.6–1.2)
CREAT SERPL-MCNC: 2.1 MG/DL (ref 0.6–1.2)
CREATININE URINE: 98.2 MG/DL (ref 28–259)
CULTURE, BLOOD 2: NORMAL
EKG ATRIAL RATE: 67 BPM
EKG ATRIAL RATE: 69 BPM
EKG ATRIAL RATE: 73 BPM
EKG ATRIAL RATE: 74 BPM
EKG DIAGNOSIS: NORMAL
EKG P AXIS: 56 DEGREES
EKG P AXIS: 60 DEGREES
EKG P AXIS: 68 DEGREES
EKG P AXIS: 68 DEGREES
EKG P-R INTERVAL: 144 MS
EKG P-R INTERVAL: 150 MS
EKG P-R INTERVAL: 172 MS
EKG P-R INTERVAL: 184 MS
EKG Q-T INTERVAL: 410 MS
EKG Q-T INTERVAL: 416 MS
EKG Q-T INTERVAL: 418 MS
EKG Q-T INTERVAL: 418 MS
EKG QRS DURATION: 88 MS
EKG QRS DURATION: 90 MS
EKG QRS DURATION: 96 MS
EKG QRS DURATION: 98 MS
EKG QTC CALCULATION (BAZETT): 441 MS
EKG QTC CALCULATION (BAZETT): 445 MS
EKG QTC CALCULATION (BAZETT): 451 MS
EKG QTC CALCULATION (BAZETT): 463 MS
EKG R AXIS: 19 DEGREES
EKG R AXIS: 21 DEGREES
EKG R AXIS: 34 DEGREES
EKG R AXIS: 9 DEGREES
EKG T AXIS: 71 DEGREES
EKG T AXIS: 75 DEGREES
EKG T AXIS: 76 DEGREES
EKG T AXIS: 90 DEGREES
EKG VENTRICULAR RATE: 67 BPM
EKG VENTRICULAR RATE: 69 BPM
EKG VENTRICULAR RATE: 73 BPM
EKG VENTRICULAR RATE: 74 BPM
EOSINOPHILS ABSOLUTE: 0 K/UL (ref 0–0.6)
EOSINOPHILS ABSOLUTE: 0.1 K/UL (ref 0–0.6)
EOSINOPHILS RELATIVE PERCENT: 0 %
EOSINOPHILS RELATIVE PERCENT: 0.1 %
EOSINOPHILS RELATIVE PERCENT: 0.2 %
EOSINOPHILS RELATIVE PERCENT: 0.2 %
EOSINOPHILS RELATIVE PERCENT: 0.3 %
EOSINOPHILS RELATIVE PERCENT: 0.6 %
EPITHELIAL CELLS, UA: ABNORMAL /HPF (ref 0–5)
ESTIMATED AVERAGE GLUCOSE: 116.9 MG/DL
ETHANOL: NORMAL MG/DL (ref 0–0.08)
FERRITIN: 607.2 NG/ML (ref 15–150)
GFR AFRICAN AMERICAN: 29
GFR AFRICAN AMERICAN: 35
GFR AFRICAN AMERICAN: 50
GFR AFRICAN AMERICAN: 55
GFR AFRICAN AMERICAN: >60
GFR NON-AFRICAN AMERICAN: 24
GFR NON-AFRICAN AMERICAN: 29
GFR NON-AFRICAN AMERICAN: 42
GFR NON-AFRICAN AMERICAN: 46
GFR NON-AFRICAN AMERICAN: 50
GFR NON-AFRICAN AMERICAN: 56
GFR NON-AFRICAN AMERICAN: 56
GFR NON-AFRICAN AMERICAN: >60
GLOBULIN: 2.9 G/DL
GLOBULIN: 3.3 G/DL
GLOBULIN: 3.6 G/DL
GLUCOSE BLD-MCNC: 110 MG/DL (ref 70–99)
GLUCOSE BLD-MCNC: 112 MG/DL (ref 70–99)
GLUCOSE BLD-MCNC: 114 MG/DL (ref 70–99)
GLUCOSE BLD-MCNC: 115 MG/DL (ref 70–99)
GLUCOSE BLD-MCNC: 116 MG/DL (ref 70–99)
GLUCOSE BLD-MCNC: 117 MG/DL (ref 70–99)
GLUCOSE BLD-MCNC: 118 MG/DL (ref 70–99)
GLUCOSE BLD-MCNC: 118 MG/DL (ref 70–99)
GLUCOSE BLD-MCNC: 119 MG/DL (ref 70–99)
GLUCOSE BLD-MCNC: 119 MG/DL (ref 70–99)
GLUCOSE BLD-MCNC: 121 MG/DL (ref 70–99)
GLUCOSE BLD-MCNC: 122 MG/DL (ref 70–99)
GLUCOSE BLD-MCNC: 122 MG/DL (ref 70–99)
GLUCOSE BLD-MCNC: 123 MG/DL (ref 70–99)
GLUCOSE BLD-MCNC: 126 MG/DL (ref 70–99)
GLUCOSE BLD-MCNC: 127 MG/DL (ref 70–99)
GLUCOSE BLD-MCNC: 129 MG/DL (ref 70–99)
GLUCOSE BLD-MCNC: 130 MG/DL (ref 70–99)
GLUCOSE BLD-MCNC: 132 MG/DL (ref 70–99)
GLUCOSE BLD-MCNC: 132 MG/DL (ref 70–99)
GLUCOSE BLD-MCNC: 134 MG/DL (ref 70–99)
GLUCOSE BLD-MCNC: 134 MG/DL (ref 70–99)
GLUCOSE BLD-MCNC: 135 MG/DL (ref 70–99)
GLUCOSE BLD-MCNC: 138 MG/DL (ref 70–99)
GLUCOSE BLD-MCNC: 139 MG/DL (ref 70–99)
GLUCOSE BLD-MCNC: 141 MG/DL (ref 70–99)
GLUCOSE BLD-MCNC: 141 MG/DL (ref 70–99)
GLUCOSE BLD-MCNC: 142 MG/DL (ref 70–99)
GLUCOSE BLD-MCNC: 145 MG/DL (ref 70–99)
GLUCOSE BLD-MCNC: 146 MG/DL (ref 70–99)
GLUCOSE BLD-MCNC: 146 MG/DL (ref 70–99)
GLUCOSE BLD-MCNC: 152 MG/DL (ref 70–99)
GLUCOSE BLD-MCNC: 153 MG/DL (ref 70–99)
GLUCOSE BLD-MCNC: 155 MG/DL (ref 70–99)
GLUCOSE BLD-MCNC: 159 MG/DL (ref 70–99)
GLUCOSE BLD-MCNC: 161 MG/DL (ref 70–99)
GLUCOSE BLD-MCNC: 176 MG/DL (ref 70–99)
GLUCOSE BLD-MCNC: 199 MG/DL (ref 70–99)
GLUCOSE BLD-MCNC: 204 MG/DL (ref 70–99)
GLUCOSE URINE: NEGATIVE MG/DL
HBA1C MFR BLD: 5.7 %
HCO3 ARTERIAL: 16 MMOL/L (ref 21–29)
HCT VFR BLD CALC: 23.8 % (ref 36–48)
HCT VFR BLD CALC: 24.6 % (ref 36–48)
HCT VFR BLD CALC: 24.7 % (ref 36–48)
HCT VFR BLD CALC: 25.2 % (ref 36–48)
HCT VFR BLD CALC: 25.5 % (ref 36–48)
HCT VFR BLD CALC: 26.9 % (ref 36–48)
HCT VFR BLD CALC: 28 % (ref 36–48)
HCT VFR BLD CALC: 32.2 % (ref 36–48)
HCT VFR BLD CALC: 35.4 % (ref 36–48)
HCT VFR BLD CALC: 39.4 % (ref 36–48)
HCT VFR BLD CALC: 39.5 % (ref 36–48)
HCT VFR BLD CALC: 39.8 % (ref 36–48)
HCT VFR BLD CALC: 40.7 % (ref 36–48)
HCT VFR BLD CALC: 41.7 % (ref 36–48)
HCT VFR BLD CALC: 42.7 % (ref 36–48)
HDLC SERPL-MCNC: 33 MG/DL (ref 40–60)
HDLC SERPL-MCNC: 36 MG/DL (ref 40–60)
HEMOGLOBIN, ART, EXTENDED: 8.8 G/DL
HEMOGLOBIN: 11.3 G/DL (ref 12–16)
HEMOGLOBIN: 12 G/DL (ref 12–16)
HEMOGLOBIN: 13.2 G/DL (ref 12–16)
HEMOGLOBIN: 13.4 G/DL (ref 12–16)
HEMOGLOBIN: 13.5 G/DL (ref 12–16)
HEMOGLOBIN: 14.3 G/DL (ref 12–16)
HEMOGLOBIN: 14.4 G/DL (ref 12–16)
HEMOGLOBIN: 14.7 G/DL (ref 12–16)
HEMOGLOBIN: 7.8 G/DL (ref 12–16)
HEMOGLOBIN: 7.8 G/DL (ref 12–16)
HEMOGLOBIN: 8.2 G/DL (ref 12–16)
HEMOGLOBIN: 8.3 G/DL (ref 12–16)
HEMOGLOBIN: 8.4 G/DL (ref 12–16)
HEMOGLOBIN: 9.1 G/DL (ref 12–16)
HEMOGLOBIN: 9.7 G/DL (ref 12–16)
HYALINE CASTS: ABNORMAL /LPF (ref 0–2)
INR BLD: 1.07 (ref 0.88–1.12)
KETONES, URINE: 15 MG/DL
KETONES, URINE: ABNORMAL MG/DL
KETONES, URINE: ABNORMAL MG/DL
KETONES, URINE: NEGATIVE MG/DL
KETONES, URINE: NEGATIVE MG/DL
LACTIC ACID: 5.9 MMOL/L (ref 0.4–2)
LDL CHOLESTEROL CALCULATED: 107 MG/DL
LDL CHOLESTEROL CALCULATED: 117 MG/DL
LEUKOCYTE ESTERASE, URINE: ABNORMAL
LEUKOCYTE ESTERASE, URINE: ABNORMAL
LEUKOCYTE ESTERASE, URINE: NEGATIVE
LV EF: 58 %
LV EF: 69 %
LVEF MODALITY: NORMAL
LVEF MODALITY: NORMAL
LYMPHOCYTES ABSOLUTE: 1.2 K/UL (ref 1–5.1)
LYMPHOCYTES ABSOLUTE: 1.9 K/UL (ref 1–5.1)
LYMPHOCYTES ABSOLUTE: 1.9 K/UL (ref 1–5.1)
LYMPHOCYTES ABSOLUTE: 2 K/UL (ref 1–5.1)
LYMPHOCYTES ABSOLUTE: 2.1 K/UL (ref 1–5.1)
LYMPHOCYTES ABSOLUTE: 2.2 K/UL (ref 1–5.1)
LYMPHOCYTES ABSOLUTE: 2.2 K/UL (ref 1–5.1)
LYMPHOCYTES ABSOLUTE: 2.3 K/UL (ref 1–5.1)
LYMPHOCYTES ABSOLUTE: 2.3 K/UL (ref 1–5.1)
LYMPHOCYTES ABSOLUTE: 2.4 K/UL (ref 1–5.1)
LYMPHOCYTES ABSOLUTE: 3 K/UL (ref 1–5.1)
LYMPHOCYTES ABSOLUTE: 4.2 K/UL (ref 1–5.1)
LYMPHOCYTES RELATIVE PERCENT: 11.3 %
LYMPHOCYTES RELATIVE PERCENT: 13.1 %
LYMPHOCYTES RELATIVE PERCENT: 16.2 %
LYMPHOCYTES RELATIVE PERCENT: 17.2 %
LYMPHOCYTES RELATIVE PERCENT: 18.7 %
LYMPHOCYTES RELATIVE PERCENT: 20 %
LYMPHOCYTES RELATIVE PERCENT: 21.9 %
LYMPHOCYTES RELATIVE PERCENT: 28.2 %
LYMPHOCYTES RELATIVE PERCENT: 30.3 %
LYMPHOCYTES RELATIVE PERCENT: 8.9 %
LYMPHOCYTES RELATIVE PERCENT: 9 %
LYMPHOCYTES RELATIVE PERCENT: 9.7 %
Lab: ABNORMAL
Lab: ABNORMAL
MAGNESIUM: 1 MG/DL (ref 1.8–2.4)
MAGNESIUM: 1.3 MG/DL (ref 1.8–2.4)
MAGNESIUM: 1.6 MG/DL (ref 1.8–2.4)
MAGNESIUM: 1.7 MG/DL (ref 1.8–2.4)
MAGNESIUM: 1.8 MG/DL (ref 1.8–2.4)
MAGNESIUM: 2.3 MG/DL (ref 1.8–2.4)
MCH RBC QN AUTO: 28.3 PG (ref 26–34)
MCH RBC QN AUTO: 29 PG (ref 26–34)
MCH RBC QN AUTO: 29 PG (ref 26–34)
MCH RBC QN AUTO: 29.2 PG (ref 26–34)
MCH RBC QN AUTO: 29.3 PG (ref 26–34)
MCH RBC QN AUTO: 29.4 PG (ref 26–34)
MCH RBC QN AUTO: 29.5 PG (ref 26–34)
MCH RBC QN AUTO: 29.5 PG (ref 26–34)
MCH RBC QN AUTO: 29.7 PG (ref 26–34)
MCH RBC QN AUTO: 29.7 PG (ref 26–34)
MCH RBC QN AUTO: 29.8 PG (ref 26–34)
MCH RBC QN AUTO: 30 PG (ref 26–34)
MCH RBC QN AUTO: 30.4 PG (ref 26–34)
MCHC RBC AUTO-ENTMCNC: 31.6 G/DL (ref 31–36)
MCHC RBC AUTO-ENTMCNC: 32.4 G/DL (ref 31–36)
MCHC RBC AUTO-ENTMCNC: 32.8 G/DL (ref 31–36)
MCHC RBC AUTO-ENTMCNC: 33.3 G/DL (ref 31–36)
MCHC RBC AUTO-ENTMCNC: 33.4 G/DL (ref 31–36)
MCHC RBC AUTO-ENTMCNC: 33.6 G/DL (ref 31–36)
MCHC RBC AUTO-ENTMCNC: 33.8 G/DL (ref 31–36)
MCHC RBC AUTO-ENTMCNC: 33.8 G/DL (ref 31–36)
MCHC RBC AUTO-ENTMCNC: 34 G/DL (ref 31–36)
MCHC RBC AUTO-ENTMCNC: 34.2 G/DL (ref 31–36)
MCHC RBC AUTO-ENTMCNC: 34.5 G/DL (ref 31–36)
MCHC RBC AUTO-ENTMCNC: 34.6 G/DL (ref 31–36)
MCHC RBC AUTO-ENTMCNC: 34.6 G/DL (ref 31–36)
MCHC RBC AUTO-ENTMCNC: 35 G/DL (ref 31–36)
MCHC RBC AUTO-ENTMCNC: 35.2 G/DL (ref 31–36)
MCV RBC AUTO: 83.7 FL (ref 80–100)
MCV RBC AUTO: 84.2 FL (ref 80–100)
MCV RBC AUTO: 84.9 FL (ref 80–100)
MCV RBC AUTO: 85 FL (ref 80–100)
MCV RBC AUTO: 85.4 FL (ref 80–100)
MCV RBC AUTO: 85.8 FL (ref 80–100)
MCV RBC AUTO: 86 FL (ref 80–100)
MCV RBC AUTO: 86.1 FL (ref 80–100)
MCV RBC AUTO: 87.2 FL (ref 80–100)
MCV RBC AUTO: 89 FL (ref 80–100)
MCV RBC AUTO: 89.2 FL (ref 80–100)
MCV RBC AUTO: 90 FL (ref 80–100)
MCV RBC AUTO: 90.5 FL (ref 80–100)
MCV RBC AUTO: 91.9 FL (ref 80–100)
MCV RBC AUTO: 92.2 FL (ref 80–100)
METANEPH/PLASMA INTERP: NORMAL
METANEPHRINE FREE PLASMA: 0.13 NMOL/L (ref 0–0.49)
METHADONE SCREEN, URINE: ABNORMAL
METHADONE SCREEN, URINE: ABNORMAL
METHEMOGLOBIN ARTERIAL: 0.1 % (ref 0–1.4)
MICROSCOPIC EXAMINATION: ABNORMAL
MICROSCOPIC EXAMINATION: ABNORMAL
MICROSCOPIC EXAMINATION: YES
MONOCYTES ABSOLUTE: 0.5 K/UL (ref 0–1.3)
MONOCYTES ABSOLUTE: 0.6 K/UL (ref 0–1.3)
MONOCYTES ABSOLUTE: 0.8 K/UL (ref 0–1.3)
MONOCYTES ABSOLUTE: 0.9 K/UL (ref 0–1.3)
MONOCYTES ABSOLUTE: 0.9 K/UL (ref 0–1.3)
MONOCYTES ABSOLUTE: 1 K/UL (ref 0–1.3)
MONOCYTES ABSOLUTE: 1.1 K/UL (ref 0–1.3)
MONOCYTES ABSOLUTE: 1.2 K/UL (ref 0–1.3)
MONOCYTES ABSOLUTE: 1.2 K/UL (ref 0–1.3)
MONOCYTES ABSOLUTE: 1.4 K/UL (ref 0–1.3)
MONOCYTES RELATIVE PERCENT: 4.6 %
MONOCYTES RELATIVE PERCENT: 5.5 %
MONOCYTES RELATIVE PERCENT: 5.5 %
MONOCYTES RELATIVE PERCENT: 5.6 %
MONOCYTES RELATIVE PERCENT: 5.7 %
MONOCYTES RELATIVE PERCENT: 6.5 %
MONOCYTES RELATIVE PERCENT: 6.7 %
MONOCYTES RELATIVE PERCENT: 6.7 %
MONOCYTES RELATIVE PERCENT: 7.2 %
MONOCYTES RELATIVE PERCENT: 7.3 %
MONOCYTES RELATIVE PERCENT: 7.5 %
MONOCYTES RELATIVE PERCENT: 7.9 %
MUCUS: ABNORMAL /LPF
MUCUS: ABNORMAL /LPF
NEUTROPHILS ABSOLUTE: 10.3 K/UL (ref 1.7–7.7)
NEUTROPHILS ABSOLUTE: 10.7 K/UL (ref 1.7–7.7)
NEUTROPHILS ABSOLUTE: 11.6 K/UL (ref 1.7–7.7)
NEUTROPHILS ABSOLUTE: 12.9 K/UL (ref 1.7–7.7)
NEUTROPHILS ABSOLUTE: 16 K/UL (ref 1.7–7.7)
NEUTROPHILS ABSOLUTE: 16.4 K/UL (ref 1.7–7.7)
NEUTROPHILS ABSOLUTE: 18.5 K/UL (ref 1.7–7.7)
NEUTROPHILS ABSOLUTE: 6.8 K/UL (ref 1.7–7.7)
NEUTROPHILS ABSOLUTE: 7 K/UL (ref 1.7–7.7)
NEUTROPHILS ABSOLUTE: 7.4 K/UL (ref 1.7–7.7)
NEUTROPHILS ABSOLUTE: 8.5 K/UL (ref 1.7–7.7)
NEUTROPHILS ABSOLUTE: 9.1 K/UL (ref 1.7–7.7)
NEUTROPHILS RELATIVE PERCENT: 61.1 %
NEUTROPHILS RELATIVE PERCENT: 64.5 %
NEUTROPHILS RELATIVE PERCENT: 71.5 %
NEUTROPHILS RELATIVE PERCENT: 72.8 %
NEUTROPHILS RELATIVE PERCENT: 73.5 %
NEUTROPHILS RELATIVE PERCENT: 76.6 %
NEUTROPHILS RELATIVE PERCENT: 76.6 %
NEUTROPHILS RELATIVE PERCENT: 79.4 %
NEUTROPHILS RELATIVE PERCENT: 82.5 %
NEUTROPHILS RELATIVE PERCENT: 82.7 %
NEUTROPHILS RELATIVE PERCENT: 84.1 %
NEUTROPHILS RELATIVE PERCENT: 85.5 %
NITRITE, URINE: NEGATIVE
NORMETANEPHRINE FREE PLASMA: 0.33 NMOL/L (ref 0–0.89)
O2 SAT, ARTERIAL: 99 % (ref 93–100)
OPIATE SCREEN URINE: ABNORMAL
OPIATE SCREEN URINE: ABNORMAL
OSMOLALITY: 580 MOSM/KG (ref 278–305)
OXYCODONE URINE: ABNORMAL
OXYCODONE URINE: ABNORMAL
PCO2 ARTERIAL: 26 MMHG (ref 35–45)
PDW BLD-RTO: 13.2 % (ref 12.4–15.4)
PDW BLD-RTO: 13.3 % (ref 12.4–15.4)
PDW BLD-RTO: 13.3 % (ref 12.4–15.4)
PDW BLD-RTO: 13.4 % (ref 12.4–15.4)
PDW BLD-RTO: 13.4 % (ref 12.4–15.4)
PDW BLD-RTO: 13.5 % (ref 12.4–15.4)
PDW BLD-RTO: 13.7 % (ref 12.4–15.4)
PDW BLD-RTO: 13.8 % (ref 12.4–15.4)
PDW BLD-RTO: 13.9 % (ref 12.4–15.4)
PDW BLD-RTO: 13.9 % (ref 12.4–15.4)
PDW BLD-RTO: 14 % (ref 12.4–15.4)
PDW BLD-RTO: 14.3 % (ref 12.4–15.4)
PDW BLD-RTO: 14.5 % (ref 12.4–15.4)
PERFORMED ON: ABNORMAL
PH ARTERIAL: 7.4 (ref 7.35–7.45)
PH UA: 5.5
PH UA: 5.5 (ref 5–8)
PH UA: 6 (ref 5–8)
PH UA: 7
PH UA: 7 (ref 5–8)
PHENCYCLIDINE SCREEN URINE: ABNORMAL
PHENCYCLIDINE SCREEN URINE: ABNORMAL
PHOSPHORUS: 3.6 MG/DL (ref 2.5–4.9)
PHOSPHORUS: 3.6 MG/DL (ref 2.5–4.9)
PLATELET # BLD: 166 K/UL (ref 135–450)
PLATELET # BLD: 227 K/UL (ref 135–450)
PLATELET # BLD: 241 K/UL (ref 135–450)
PLATELET # BLD: 245 K/UL (ref 135–450)
PLATELET # BLD: 249 K/UL (ref 135–450)
PLATELET # BLD: 251 K/UL (ref 135–450)
PLATELET # BLD: 259 K/UL (ref 135–450)
PLATELET # BLD: 272 K/UL (ref 135–450)
PLATELET # BLD: 360 K/UL (ref 135–450)
PLATELET # BLD: 366 K/UL (ref 135–450)
PLATELET # BLD: 368 K/UL (ref 135–450)
PLATELET # BLD: 373 K/UL (ref 135–450)
PLATELET # BLD: 385 K/UL (ref 135–450)
PLATELET # BLD: 387 K/UL (ref 135–450)
PLATELET # BLD: 417 K/UL (ref 135–450)
PMV BLD AUTO: 10.1 FL (ref 5–10.5)
PMV BLD AUTO: 10.1 FL (ref 5–10.5)
PMV BLD AUTO: 8.7 FL (ref 5–10.5)
PMV BLD AUTO: 9 FL (ref 5–10.5)
PMV BLD AUTO: 9.2 FL (ref 5–10.5)
PMV BLD AUTO: 9.3 FL (ref 5–10.5)
PMV BLD AUTO: 9.3 FL (ref 5–10.5)
PMV BLD AUTO: 9.4 FL (ref 5–10.5)
PMV BLD AUTO: 9.4 FL (ref 5–10.5)
PMV BLD AUTO: 9.5 FL (ref 5–10.5)
PMV BLD AUTO: 9.6 FL (ref 5–10.5)
PMV BLD AUTO: 9.6 FL (ref 5–10.5)
PMV BLD AUTO: 9.9 FL (ref 5–10.5)
PO2 ARTERIAL: 115 MMHG (ref 75–108)
POTASSIUM REFLEX MAGNESIUM: 2.6 MMOL/L (ref 3.5–5.1)
POTASSIUM REFLEX MAGNESIUM: 3.2 MMOL/L (ref 3.5–5.1)
POTASSIUM REFLEX MAGNESIUM: 3.7 MMOL/L (ref 3.5–5.1)
POTASSIUM REFLEX MAGNESIUM: 3.7 MMOL/L (ref 3.5–5.1)
POTASSIUM REFLEX MAGNESIUM: 3.8 MMOL/L (ref 3.5–5.1)
POTASSIUM REFLEX MAGNESIUM: 3.9 MMOL/L (ref 3.5–5.1)
POTASSIUM REFLEX MAGNESIUM: 3.9 MMOL/L (ref 3.5–5.1)
POTASSIUM REFLEX MAGNESIUM: 4 MMOL/L (ref 3.5–5.1)
POTASSIUM REFLEX MAGNESIUM: 4.1 MMOL/L (ref 3.5–5.1)
POTASSIUM REFLEX MAGNESIUM: 4.1 MMOL/L (ref 3.5–5.1)
POTASSIUM REFLEX MAGNESIUM: 4.2 MMOL/L (ref 3.5–5.1)
POTASSIUM REFLEX MAGNESIUM: 4.3 MMOL/L (ref 3.5–5.1)
POTASSIUM REFLEX MAGNESIUM: 4.7 MMOL/L (ref 3.5–5.1)
POTASSIUM SERPL-SCNC: 2.6 MMOL/L (ref 3.5–5.1)
POTASSIUM SERPL-SCNC: 2.9 MMOL/L (ref 3.5–5.1)
POTASSIUM SERPL-SCNC: 2.9 MMOL/L (ref 3.5–5.1)
POTASSIUM SERPL-SCNC: 3 MMOL/L (ref 3.5–5.1)
POTASSIUM SERPL-SCNC: 3.5 MMOL/L (ref 3.5–5.1)
POTASSIUM SERPL-SCNC: 3.6 MMOL/L (ref 3.5–5.1)
POTASSIUM SERPL-SCNC: 3.8 MMOL/L (ref 3.5–5.1)
PRO-BNP: 237 PG/ML (ref 0–124)
PRO-BNP: 484 PG/ML (ref 0–124)
PROPOXYPHENE SCREEN: ABNORMAL
PROPOXYPHENE SCREEN: ABNORMAL
PROTEIN PROTEIN: 13.7 MG/DL
PROTEIN UA: NEGATIVE MG/DL
PROTHROMBIN TIME: 12.1 SEC (ref 9.9–12.7)
RBC # BLD: 2.64 M/UL (ref 4–5.2)
RBC # BLD: 2.69 M/UL (ref 4–5.2)
RBC # BLD: 2.76 M/UL (ref 4–5.2)
RBC # BLD: 2.77 M/UL (ref 4–5.2)
RBC # BLD: 2.79 M/UL (ref 4–5.2)
RBC # BLD: 3.09 M/UL (ref 4–5.2)
RBC # BLD: 3.28 M/UL (ref 4–5.2)
RBC # BLD: 3.79 M/UL (ref 4–5.2)
RBC # BLD: 4.13 M/UL (ref 4–5.2)
RBC # BLD: 4.43 M/UL (ref 4–5.2)
RBC # BLD: 4.62 M/UL (ref 4–5.2)
RBC # BLD: 4.67 M/UL (ref 4–5.2)
RBC # BLD: 4.84 M/UL (ref 4–5.2)
RBC # BLD: 4.86 M/UL (ref 4–5.2)
RBC # BLD: 5.03 M/UL (ref 4–5.2)
RBC UA: ABNORMAL /HPF (ref 0–4)
RENIN ACTIVITY: 0.7 NG/ML/HR
SARS-COV-2: NOT DETECTED
SODIUM BLD-SCNC: 132 MMOL/L (ref 136–145)
SODIUM BLD-SCNC: 133 MMOL/L (ref 136–145)
SODIUM BLD-SCNC: 134 MMOL/L (ref 136–145)
SODIUM BLD-SCNC: 135 MMOL/L (ref 136–145)
SODIUM BLD-SCNC: 136 MMOL/L (ref 136–145)
SODIUM BLD-SCNC: 137 MMOL/L (ref 136–145)
SODIUM BLD-SCNC: 138 MMOL/L (ref 136–145)
SODIUM BLD-SCNC: 138 MMOL/L (ref 136–145)
SODIUM BLD-SCNC: 139 MMOL/L (ref 136–145)
SODIUM BLD-SCNC: 141 MMOL/L (ref 136–145)
SODIUM BLD-SCNC: 143 MMOL/L (ref 136–145)
SODIUM BLD-SCNC: 146 MMOL/L (ref 136–145)
SODIUM BLD-SCNC: 148 MMOL/L (ref 136–145)
SODIUM BLD-SCNC: 149 MMOL/L (ref 136–145)
SODIUM BLD-SCNC: 152 MMOL/L (ref 136–145)
SODIUM BLD-SCNC: 153 MMOL/L (ref 136–145)
SODIUM BLD-SCNC: 157 MMOL/L (ref 136–145)
SODIUM BLD-SCNC: 157 MMOL/L (ref 136–145)
SODIUM BLD-SCNC: 158 MMOL/L (ref 136–145)
SODIUM BLD-SCNC: 160 MMOL/L (ref 136–145)
SODIUM BLD-SCNC: 162 MMOL/L (ref 136–145)
SODIUM BLD-SCNC: 164 MMOL/L (ref 136–145)
SODIUM BLD-SCNC: 165 MMOL/L (ref 136–145)
SODIUM BLD-SCNC: 166 MMOL/L (ref 136–145)
SODIUM BLD-SCNC: 167 MMOL/L (ref 136–145)
SODIUM BLD-SCNC: 167 MMOL/L (ref 136–145)
SODIUM BLD-SCNC: 168 MMOL/L (ref 136–145)
SODIUM BLD-SCNC: 169 MMOL/L (ref 136–145)
SODIUM BLD-SCNC: 169 MMOL/L (ref 136–145)
SODIUM BLD-SCNC: 171 MMOL/L (ref 136–145)
SPECIFIC GRAVITY UA: 1.02 (ref 1–1.03)
SPECIFIC GRAVITY UA: <=1.005 (ref 1–1.03)
SPECIMEN STATUS: NORMAL
T4 FREE: 1 NG/DL (ref 0.9–1.8)
TCO2 ARTERIAL: 17 MMOL/L
TOTAL CK: 744 U/L (ref 26–192)
TOTAL PROTEIN: 6.7 G/DL (ref 6.4–8.2)
TOTAL PROTEIN: 6.8 G/DL (ref 6.4–8.2)
TOTAL PROTEIN: 7.4 G/DL (ref 6.4–8.2)
TOTAL PROTEIN: 7.5 G/DL (ref 6.4–8.2)
TOTAL PROTEIN: 8.1 G/DL (ref 6.4–8.2)
TRIGL SERPL-MCNC: 119 MG/DL (ref 0–150)
TRIGL SERPL-MCNC: 124 MG/DL (ref 0–150)
TROPONIN: <0.01 NG/ML
TSH SERPL DL<=0.05 MIU/L-ACNC: 0.76 UIU/ML (ref 0.27–4.2)
URINE REFLEX TO CULTURE: ABNORMAL
URINE TYPE: ABNORMAL
UROBILINOGEN, URINE: 0.2 E.U./DL
UROBILINOGEN, URINE: 1 E.U./DL
UROBILINOGEN, URINE: 1 E.U./DL
VLDLC SERPL CALC-MCNC: 24 MG/DL
VLDLC SERPL CALC-MCNC: 25 MG/DL
WBC # BLD: 10.2 K/UL (ref 4–11)
WBC # BLD: 10.6 K/UL (ref 4–11)
WBC # BLD: 12.4 K/UL (ref 4–11)
WBC # BLD: 13.5 K/UL (ref 4–11)
WBC # BLD: 13.8 K/UL (ref 4–11)
WBC # BLD: 13.9 K/UL (ref 4–11)
WBC # BLD: 13.9 K/UL (ref 4–11)
WBC # BLD: 14.6 K/UL (ref 4–11)
WBC # BLD: 16.3 K/UL (ref 4–11)
WBC # BLD: 18.2 K/UL (ref 4–11)
WBC # BLD: 19.4 K/UL (ref 4–11)
WBC # BLD: 19.9 K/UL (ref 4–11)
WBC # BLD: 21.7 K/UL (ref 4–11)
WBC # BLD: 9.2 K/UL (ref 4–11)
WBC # BLD: 9.8 K/UL (ref 4–11)
WBC UA: ABNORMAL /HPF (ref 0–5)

## 2021-01-01 PROCEDURE — 36415 COLL VENOUS BLD VENIPUNCTURE: CPT

## 2021-01-01 PROCEDURE — 85025 COMPLETE CBC W/AUTO DIFF WBC: CPT

## 2021-01-01 PROCEDURE — 6360000002 HC RX W HCPCS: Performed by: INTERNAL MEDICINE

## 2021-01-01 PROCEDURE — 84244 ASSAY OF RENIN: CPT

## 2021-01-01 PROCEDURE — 2580000003 HC RX 258: Performed by: STUDENT IN AN ORGANIZED HEALTH CARE EDUCATION/TRAINING PROGRAM

## 2021-01-01 PROCEDURE — 97110 THERAPEUTIC EXERCISES: CPT

## 2021-01-01 PROCEDURE — 80053 COMPREHEN METABOLIC PANEL: CPT

## 2021-01-01 PROCEDURE — 80069 RENAL FUNCTION PANEL: CPT

## 2021-01-01 PROCEDURE — 84295 ASSAY OF SERUM SODIUM: CPT

## 2021-01-01 PROCEDURE — 93017 CV STRESS TEST TRACING ONLY: CPT

## 2021-01-01 PROCEDURE — 2000000000 HC ICU R&B

## 2021-01-01 PROCEDURE — 6370000000 HC RX 637 (ALT 250 FOR IP): Performed by: INTERNAL MEDICINE

## 2021-01-01 PROCEDURE — 70551 MRI BRAIN STEM W/O DYE: CPT

## 2021-01-01 PROCEDURE — 70496 CT ANGIOGRAPHY HEAD: CPT

## 2021-01-01 PROCEDURE — 81001 URINALYSIS AUTO W/SCOPE: CPT

## 2021-01-01 PROCEDURE — 2580000003 HC RX 258: Performed by: INTERNAL MEDICINE

## 2021-01-01 PROCEDURE — 6360000002 HC RX W HCPCS: Performed by: HOSPITALIST

## 2021-01-01 PROCEDURE — 97530 THERAPEUTIC ACTIVITIES: CPT

## 2021-01-01 PROCEDURE — 99232 SBSQ HOSP IP/OBS MODERATE 35: CPT | Performed by: NURSE PRACTITIONER

## 2021-01-01 PROCEDURE — 6370000000 HC RX 637 (ALT 250 FOR IP): Performed by: HOSPITALIST

## 2021-01-01 PROCEDURE — 85027 COMPLETE CBC AUTOMATED: CPT

## 2021-01-01 PROCEDURE — U0005 INFEC AGEN DETEC AMPLI PROBE: HCPCS

## 2021-01-01 PROCEDURE — 80307 DRUG TEST PRSMV CHEM ANLYZR: CPT

## 2021-01-01 PROCEDURE — 92507 TX SP LANG VOICE COMM INDIV: CPT

## 2021-01-01 PROCEDURE — 99214 OFFICE O/P EST MOD 30 MIN: CPT | Performed by: NURSE PRACTITIONER

## 2021-01-01 PROCEDURE — 85610 PROTHROMBIN TIME: CPT

## 2021-01-01 PROCEDURE — 83835 ASSAY OF METANEPHRINES: CPT

## 2021-01-01 PROCEDURE — 99233 SBSQ HOSP IP/OBS HIGH 50: CPT | Performed by: PSYCHIATRY & NEUROLOGY

## 2021-01-01 PROCEDURE — 6360000002 HC RX W HCPCS: Performed by: EMERGENCY MEDICINE

## 2021-01-01 PROCEDURE — APPNB180 APP NON BILLABLE TIME > 60 MINS: Performed by: NURSE PRACTITIONER

## 2021-01-01 PROCEDURE — 93010 ELECTROCARDIOGRAM REPORT: CPT | Performed by: INTERNAL MEDICINE

## 2021-01-01 PROCEDURE — 99222 1ST HOSP IP/OBS MODERATE 55: CPT | Performed by: NURSE PRACTITIONER

## 2021-01-01 PROCEDURE — 6360000002 HC RX W HCPCS: Performed by: STUDENT IN AN ORGANIZED HEALTH CARE EDUCATION/TRAINING PROGRAM

## 2021-01-01 PROCEDURE — 80048 BASIC METABOLIC PNL TOTAL CA: CPT

## 2021-01-01 PROCEDURE — 2700000000 HC OXYGEN THERAPY PER DAY

## 2021-01-01 PROCEDURE — 97535 SELF CARE MNGMENT TRAINING: CPT

## 2021-01-01 PROCEDURE — 92610 EVALUATE SWALLOWING FUNCTION: CPT

## 2021-01-01 PROCEDURE — 96365 THER/PROPH/DIAG IV INF INIT: CPT

## 2021-01-01 PROCEDURE — 6360000004 HC RX CONTRAST MEDICATION: Performed by: EMERGENCY MEDICINE

## 2021-01-01 PROCEDURE — C8929 TTE W OR WO FOL WCON,DOPPLER: HCPCS

## 2021-01-01 PROCEDURE — 92950 HEART/LUNG RESUSCITATION CPR: CPT | Performed by: INTERNAL MEDICINE

## 2021-01-01 PROCEDURE — 02HV33Z INSERTION OF INFUSION DEVICE INTO SUPERIOR VENA CAVA, PERCUTANEOUS APPROACH: ICD-10-PCS | Performed by: INTERNAL MEDICINE

## 2021-01-01 PROCEDURE — 1200000000 HC SEMI PRIVATE

## 2021-01-01 PROCEDURE — 83735 ASSAY OF MAGNESIUM: CPT

## 2021-01-01 PROCEDURE — 6370000000 HC RX 637 (ALT 250 FOR IP): Performed by: STUDENT IN AN ORGANIZED HEALTH CARE EDUCATION/TRAINING PROGRAM

## 2021-01-01 PROCEDURE — 97166 OT EVAL MOD COMPLEX 45 MIN: CPT

## 2021-01-01 PROCEDURE — 99232 SBSQ HOSP IP/OBS MODERATE 35: CPT

## 2021-01-01 PROCEDURE — 2500000003 HC RX 250 WO HCPCS: Performed by: HOSPITALIST

## 2021-01-01 PROCEDURE — 6370000000 HC RX 637 (ALT 250 FOR IP): Performed by: NURSE PRACTITIONER

## 2021-01-01 PROCEDURE — 93880 EXTRACRANIAL BILAT STUDY: CPT

## 2021-01-01 PROCEDURE — 83930 ASSAY OF BLOOD OSMOLALITY: CPT

## 2021-01-01 PROCEDURE — 96375 TX/PRO/DX INJ NEW DRUG ADDON: CPT

## 2021-01-01 PROCEDURE — 51798 US URINE CAPACITY MEASURE: CPT

## 2021-01-01 PROCEDURE — 99214 OFFICE O/P EST MOD 30 MIN: CPT | Performed by: INTERNAL MEDICINE

## 2021-01-01 PROCEDURE — U0003 INFECTIOUS AGENT DETECTION BY NUCLEIC ACID (DNA OR RNA); SEVERE ACUTE RESPIRATORY SYNDROME CORONAVIRUS 2 (SARS-COV-2) (CORONAVIRUS DISEASE [COVID-19]), AMPLIFIED PROBE TECHNIQUE, MAKING USE OF HIGH THROUGHPUT TECHNOLOGIES AS DESCRIBED BY CMS-2020-01-R: HCPCS

## 2021-01-01 PROCEDURE — 2500000003 HC RX 250 WO HCPCS: Performed by: STUDENT IN AN ORGANIZED HEALTH CARE EDUCATION/TRAINING PROGRAM

## 2021-01-01 PROCEDURE — 82728 ASSAY OF FERRITIN: CPT

## 2021-01-01 PROCEDURE — 74018 RADEX ABDOMEN 1 VIEW: CPT

## 2021-01-01 PROCEDURE — 2580000003 HC RX 258: Performed by: HOSPITALIST

## 2021-01-01 PROCEDURE — 83605 ASSAY OF LACTIC ACID: CPT

## 2021-01-01 PROCEDURE — 84484 ASSAY OF TROPONIN QUANT: CPT

## 2021-01-01 PROCEDURE — 6370000000 HC RX 637 (ALT 250 FOR IP): Performed by: EMERGENCY MEDICINE

## 2021-01-01 PROCEDURE — 97164 PT RE-EVAL EST PLAN CARE: CPT

## 2021-01-01 PROCEDURE — 71045 X-RAY EXAM CHEST 1 VIEW: CPT

## 2021-01-01 PROCEDURE — 82550 ASSAY OF CK (CPK): CPT

## 2021-01-01 PROCEDURE — 97163 PT EVAL HIGH COMPLEX 45 MIN: CPT

## 2021-01-01 PROCEDURE — 97129 THER IVNTJ 1ST 15 MIN: CPT

## 2021-01-01 PROCEDURE — 2060000000 HC ICU INTERMEDIATE R&B

## 2021-01-01 PROCEDURE — 69209 REMOVE IMPACTED EAR WAX UNI: CPT | Performed by: NURSE PRACTITIONER

## 2021-01-01 PROCEDURE — 97112 NEUROMUSCULAR REEDUCATION: CPT

## 2021-01-01 PROCEDURE — 36600 WITHDRAWAL OF ARTERIAL BLOOD: CPT

## 2021-01-01 PROCEDURE — 99233 SBSQ HOSP IP/OBS HIGH 50: CPT | Performed by: NURSE PRACTITIONER

## 2021-01-01 PROCEDURE — 93005 ELECTROCARDIOGRAM TRACING: CPT | Performed by: EMERGENCY MEDICINE

## 2021-01-01 PROCEDURE — 70450 CT HEAD/BRAIN W/O DYE: CPT

## 2021-01-01 PROCEDURE — 99254 IP/OBS CNSLTJ NEW/EST MOD 60: CPT | Performed by: INTERNAL MEDICINE

## 2021-01-01 PROCEDURE — 93005 ELECTROCARDIOGRAM TRACING: CPT | Performed by: HOSPITALIST

## 2021-01-01 PROCEDURE — 83880 ASSAY OF NATRIURETIC PEPTIDE: CPT

## 2021-01-01 PROCEDURE — 81003 URINALYSIS AUTO W/O SCOPE: CPT

## 2021-01-01 PROCEDURE — 83540 ASSAY OF IRON: CPT

## 2021-01-01 PROCEDURE — 78452 HT MUSCLE IMAGE SPECT MULT: CPT

## 2021-01-01 PROCEDURE — 92526 ORAL FUNCTION THERAPY: CPT

## 2021-01-01 PROCEDURE — 93005 ELECTROCARDIOGRAM TRACING: CPT | Performed by: STUDENT IN AN ORGANIZED HEALTH CARE EDUCATION/TRAINING PROGRAM

## 2021-01-01 PROCEDURE — 51702 INSERT TEMP BLADDER CATH: CPT

## 2021-01-01 PROCEDURE — 93005 ELECTROCARDIOGRAM TRACING: CPT | Performed by: INTERNAL MEDICINE

## 2021-01-01 PROCEDURE — A9576 INJ PROHANCE MULTIPACK: HCPCS | Performed by: NURSE PRACTITIONER

## 2021-01-01 PROCEDURE — 82570 ASSAY OF URINE CREATININE: CPT

## 2021-01-01 PROCEDURE — 99233 SBSQ HOSP IP/OBS HIGH 50: CPT | Performed by: INTERNAL MEDICINE

## 2021-01-01 PROCEDURE — 99284 EMERGENCY DEPT VISIT MOD MDM: CPT

## 2021-01-01 PROCEDURE — 99212 OFFICE O/P EST SF 10 MIN: CPT | Performed by: NURSE PRACTITIONER

## 2021-01-01 PROCEDURE — 87040 BLOOD CULTURE FOR BACTERIA: CPT

## 2021-01-01 PROCEDURE — 36556 INSERT NON-TUNNEL CV CATH: CPT

## 2021-01-01 PROCEDURE — 6360000004 HC RX CONTRAST MEDICATION: Performed by: HOSPITALIST

## 2021-01-01 PROCEDURE — 95819 EEG AWAKE AND ASLEEP: CPT

## 2021-01-01 PROCEDURE — 80061 LIPID PANEL: CPT

## 2021-01-01 PROCEDURE — 92523 SPEECH SOUND LANG COMPREHEN: CPT

## 2021-01-01 PROCEDURE — 6360000004 HC RX CONTRAST MEDICATION: Performed by: NURSE PRACTITIONER

## 2021-01-01 PROCEDURE — 97167 OT EVAL HIGH COMPLEX 60 MIN: CPT

## 2021-01-01 PROCEDURE — C8923 2D TTE W OR W/O FOL W/CON,CO: HCPCS

## 2021-01-01 PROCEDURE — 83550 IRON BINDING TEST: CPT

## 2021-01-01 PROCEDURE — 82088 ASSAY OF ALDOSTERONE: CPT

## 2021-01-01 PROCEDURE — 84132 ASSAY OF SERUM POTASSIUM: CPT

## 2021-01-01 PROCEDURE — 94761 N-INVAS EAR/PLS OXIMETRY MLT: CPT

## 2021-01-01 PROCEDURE — 84439 ASSAY OF FREE THYROXINE: CPT

## 2021-01-01 PROCEDURE — 75571 CT HRT W/O DYE W/CA TEST: CPT

## 2021-01-01 PROCEDURE — 70553 MRI BRAIN STEM W/O & W/DYE: CPT

## 2021-01-01 PROCEDURE — 3430000000 HC RX DIAGNOSTIC RADIOPHARMACEUTICAL: Performed by: INTERNAL MEDICINE

## 2021-01-01 PROCEDURE — 97116 GAIT TRAINING THERAPY: CPT

## 2021-01-01 PROCEDURE — 6360000002 HC RX W HCPCS: Performed by: NURSE PRACTITIONER

## 2021-01-01 PROCEDURE — 93308 TTE F-UP OR LMTD: CPT

## 2021-01-01 PROCEDURE — 99254 IP/OBS CNSLTJ NEW/EST MOD 60: CPT | Performed by: PSYCHIATRY & NEUROLOGY

## 2021-01-01 PROCEDURE — A9502 TC99M TETROFOSMIN: HCPCS | Performed by: INTERNAL MEDICINE

## 2021-01-01 PROCEDURE — 2500000003 HC RX 250 WO HCPCS: Performed by: NURSE PRACTITIONER

## 2021-01-01 PROCEDURE — 1111F DSCHRG MED/CURRENT MED MERGE: CPT | Performed by: NURSE PRACTITIONER

## 2021-01-01 PROCEDURE — 94760 N-INVAS EAR/PLS OXIMETRY 1: CPT

## 2021-01-01 PROCEDURE — 93272 ECG/REVIEW INTERPRET ONLY: CPT | Performed by: INTERNAL MEDICINE

## 2021-01-01 PROCEDURE — 83036 HEMOGLOBIN GLYCOSYLATED A1C: CPT

## 2021-01-01 PROCEDURE — 99223 1ST HOSP IP/OBS HIGH 75: CPT | Performed by: PSYCHIATRY & NEUROLOGY

## 2021-01-01 PROCEDURE — 82947 ASSAY GLUCOSE BLOOD QUANT: CPT

## 2021-01-01 PROCEDURE — 99222 1ST HOSP IP/OBS MODERATE 55: CPT | Performed by: INTERNAL MEDICINE

## 2021-01-01 PROCEDURE — 2500000003 HC RX 250 WO HCPCS: Performed by: EMERGENCY MEDICINE

## 2021-01-01 PROCEDURE — 99213 OFFICE O/P EST LOW 20 MIN: CPT | Performed by: NURSE PRACTITIONER

## 2021-01-01 PROCEDURE — 99232 SBSQ HOSP IP/OBS MODERATE 35: CPT | Performed by: INTERNAL MEDICINE

## 2021-01-01 PROCEDURE — 82077 ASSAY SPEC XCP UR&BREATH IA: CPT

## 2021-01-01 PROCEDURE — 97162 PT EVAL MOD COMPLEX 30 MIN: CPT

## 2021-01-01 PROCEDURE — 84156 ASSAY OF PROTEIN URINE: CPT

## 2021-01-01 PROCEDURE — 84443 ASSAY THYROID STIM HORMONE: CPT

## 2021-01-01 PROCEDURE — 2580000003 HC RX 258: Performed by: EMERGENCY MEDICINE

## 2021-01-01 PROCEDURE — 2580000003 HC RX 258: Performed by: NURSE PRACTITIONER

## 2021-01-01 PROCEDURE — 82803 BLOOD GASES ANY COMBINATION: CPT

## 2021-01-01 RX ORDER — ONDANSETRON 2 MG/ML
4 INJECTION INTRAMUSCULAR; INTRAVENOUS EVERY 6 HOURS PRN
Status: DISCONTINUED | OUTPATIENT
Start: 2021-01-01 | End: 2021-01-01 | Stop reason: HOSPADM

## 2021-01-01 RX ORDER — ACETAMINOPHEN 325 MG/1
650 TABLET ORAL EVERY 6 HOURS PRN
Status: DISCONTINUED | OUTPATIENT
Start: 2021-01-01 | End: 2021-01-01 | Stop reason: HOSPADM

## 2021-01-01 RX ORDER — CHLORTHALIDONE 25 MG/1
25 TABLET ORAL DAILY
Status: DISCONTINUED | OUTPATIENT
Start: 2021-01-01 | End: 2021-01-01

## 2021-01-01 RX ORDER — LORAZEPAM 2 MG/ML
1 INJECTION INTRAMUSCULAR ONCE
Status: COMPLETED | OUTPATIENT
Start: 2021-01-01 | End: 2021-01-01

## 2021-01-01 RX ORDER — SODIUM CHLORIDE 9 MG/ML
25 INJECTION, SOLUTION INTRAVENOUS PRN
Status: DISCONTINUED | OUTPATIENT
Start: 2021-01-01 | End: 2021-01-01

## 2021-01-01 RX ORDER — HYDRALAZINE HYDROCHLORIDE 20 MG/ML
20 INJECTION INTRAMUSCULAR; INTRAVENOUS ONCE
Status: COMPLETED | OUTPATIENT
Start: 2021-01-01 | End: 2021-01-01

## 2021-01-01 RX ORDER — 0.9 % SODIUM CHLORIDE 0.9 %
500 INTRAVENOUS SOLUTION INTRAVENOUS ONCE
Status: COMPLETED | OUTPATIENT
Start: 2021-01-01 | End: 2021-01-01

## 2021-01-01 RX ORDER — IPRATROPIUM BROMIDE AND ALBUTEROL SULFATE 2.5; .5 MG/3ML; MG/3ML
1 SOLUTION RESPIRATORY (INHALATION)
Status: DISCONTINUED | OUTPATIENT
Start: 2021-01-01 | End: 2021-01-01 | Stop reason: HOSPADM

## 2021-01-01 RX ORDER — HYDROCODONE BITARTRATE AND ACETAMINOPHEN 5; 325 MG/1; MG/1
1 TABLET ORAL EVERY 6 HOURS PRN
Status: DISCONTINUED | OUTPATIENT
Start: 2021-01-01 | End: 2021-01-01 | Stop reason: HOSPADM

## 2021-01-01 RX ORDER — ATORVASTATIN CALCIUM 40 MG/1
40 TABLET, FILM COATED ORAL NIGHTLY
Qty: 30 TABLET | Refills: 1 | Status: SHIPPED | OUTPATIENT
Start: 2021-01-01 | End: 2021-01-01 | Stop reason: SDUPTHER

## 2021-01-01 RX ORDER — ATORVASTATIN CALCIUM 80 MG/1
80 TABLET, FILM COATED ORAL NIGHTLY
Qty: 30 TABLET | Refills: 3
Start: 2021-01-01

## 2021-01-01 RX ORDER — HYDRALAZINE HYDROCHLORIDE 20 MG/ML
10 INJECTION INTRAMUSCULAR; INTRAVENOUS ONCE
Status: COMPLETED | OUTPATIENT
Start: 2021-01-01 | End: 2021-01-01

## 2021-01-01 RX ORDER — LORAZEPAM 2 MG/ML
1 INJECTION INTRAMUSCULAR ONCE
Status: DISCONTINUED | OUTPATIENT
Start: 2021-01-01 | End: 2021-01-01 | Stop reason: SDUPTHER

## 2021-01-01 RX ORDER — ACETAMINOPHEN 650 MG/1
650 SUPPOSITORY RECTAL EVERY 6 HOURS PRN
Status: DISCONTINUED | OUTPATIENT
Start: 2021-01-01 | End: 2021-01-01 | Stop reason: HOSPADM

## 2021-01-01 RX ORDER — AMLODIPINE BESYLATE 10 MG/1
10 TABLET ORAL DAILY
Status: DISCONTINUED | OUTPATIENT
Start: 2021-01-01 | End: 2021-01-01

## 2021-01-01 RX ORDER — LISINOPRIL 20 MG/1
20 TABLET ORAL DAILY
Status: DISCONTINUED | OUTPATIENT
Start: 2021-01-01 | End: 2021-01-01 | Stop reason: HOSPADM

## 2021-01-01 RX ORDER — MAGNESIUM SULFATE HEPTAHYDRATE 500 MG/ML
2000 INJECTION, SOLUTION INTRAMUSCULAR; INTRAVENOUS ONCE
Status: DISCONTINUED | OUTPATIENT
Start: 2021-01-01 | End: 2021-01-01 | Stop reason: SDUPTHER

## 2021-01-01 RX ORDER — AMLODIPINE BESYLATE 5 MG/1
10 TABLET ORAL DAILY
Status: DISCONTINUED | OUTPATIENT
Start: 2021-01-01 | End: 2021-01-01 | Stop reason: HOSPADM

## 2021-01-01 RX ORDER — POLYETHYLENE GLYCOL 3350 17 G/17G
17 POWDER, FOR SOLUTION ORAL DAILY PRN
Status: DISCONTINUED | OUTPATIENT
Start: 2021-01-01 | End: 2021-01-01 | Stop reason: HOSPADM

## 2021-01-01 RX ORDER — ASPIRIN 81 MG/1
81 TABLET, CHEWABLE ORAL DAILY
Status: DISCONTINUED | OUTPATIENT
Start: 2021-01-01 | End: 2021-01-01 | Stop reason: HOSPADM

## 2021-01-01 RX ORDER — NICOTINE 21 MG/24HR
1 PATCH, TRANSDERMAL 24 HOURS TRANSDERMAL DAILY
Status: DISCONTINUED | OUTPATIENT
Start: 2021-01-01 | End: 2021-01-01 | Stop reason: HOSPADM

## 2021-01-01 RX ORDER — ONDANSETRON 4 MG/1
4 TABLET, ORALLY DISINTEGRATING ORAL EVERY 8 HOURS PRN
Status: DISCONTINUED | OUTPATIENT
Start: 2021-01-01 | End: 2021-01-01 | Stop reason: HOSPADM

## 2021-01-01 RX ORDER — SODIUM CHLORIDE 450 MG/100ML
INJECTION, SOLUTION INTRAVENOUS CONTINUOUS
Status: DISCONTINUED | OUTPATIENT
Start: 2021-01-01 | End: 2021-01-01 | Stop reason: HOSPADM

## 2021-01-01 RX ORDER — LABETALOL HYDROCHLORIDE 5 MG/ML
10 INJECTION, SOLUTION INTRAVENOUS ONCE
Status: COMPLETED | OUTPATIENT
Start: 2021-01-01 | End: 2021-01-01

## 2021-01-01 RX ORDER — POTASSIUM CHLORIDE 750 MG/1
40 TABLET, EXTENDED RELEASE ORAL ONCE
Status: COMPLETED | OUTPATIENT
Start: 2021-01-01 | End: 2021-01-01

## 2021-01-01 RX ORDER — OMEPRAZOLE 20 MG/1
20 CAPSULE, DELAYED RELEASE ORAL DAILY
Qty: 90 CAPSULE | Refills: 1 | Status: SHIPPED | OUTPATIENT
Start: 2021-01-01

## 2021-01-01 RX ORDER — FUROSEMIDE 10 MG/ML
10 INJECTION INTRAMUSCULAR; INTRAVENOUS ONCE
Status: COMPLETED | OUTPATIENT
Start: 2021-01-01 | End: 2021-01-01

## 2021-01-01 RX ORDER — MAGNESIUM SULFATE IN WATER 40 MG/ML
2000 INJECTION, SOLUTION INTRAVENOUS ONCE
Status: COMPLETED | OUTPATIENT
Start: 2021-01-01 | End: 2021-01-01

## 2021-01-01 RX ORDER — SODIUM CHLORIDE 0.9 % (FLUSH) 0.9 %
10 SYRINGE (ML) INJECTION PRN
Status: DISCONTINUED | OUTPATIENT
Start: 2021-01-01 | End: 2021-01-01 | Stop reason: HOSPADM

## 2021-01-01 RX ORDER — FUROSEMIDE 10 MG/ML
20 INJECTION INTRAMUSCULAR; INTRAVENOUS ONCE
Status: DISCONTINUED | OUTPATIENT
Start: 2021-01-01 | End: 2021-01-01

## 2021-01-01 RX ORDER — LOSARTAN POTASSIUM 25 MG/1
25 TABLET ORAL DAILY
Qty: 30 TABLET | Refills: 3 | Status: SHIPPED | OUTPATIENT
Start: 2021-01-01

## 2021-01-01 RX ORDER — CARVEDILOL 6.25 MG/1
6.25 TABLET ORAL 2 TIMES DAILY WITH MEALS
Status: DISCONTINUED | OUTPATIENT
Start: 2021-01-01 | End: 2021-01-01

## 2021-01-01 RX ORDER — AMLODIPINE BESYLATE 10 MG/1
10 TABLET ORAL DAILY
Qty: 30 TABLET | Refills: 0 | Status: SHIPPED | OUTPATIENT
Start: 2021-01-01 | End: 2021-01-01

## 2021-01-01 RX ORDER — 3% SODIUM CHLORIDE 3 G/100ML
25 INJECTION, SOLUTION INTRAVENOUS CONTINUOUS
Status: DISPENSED | OUTPATIENT
Start: 2021-01-01 | End: 2021-01-01

## 2021-01-01 RX ORDER — HYDRALAZINE HYDROCHLORIDE 20 MG/ML
10 INJECTION INTRAMUSCULAR; INTRAVENOUS EVERY 6 HOURS PRN
Status: DISCONTINUED | OUTPATIENT
Start: 2021-01-01 | End: 2021-01-01 | Stop reason: HOSPADM

## 2021-01-01 RX ORDER — ASPIRIN 81 MG/1
324 TABLET, CHEWABLE ORAL ONCE
Status: COMPLETED | OUTPATIENT
Start: 2021-01-01 | End: 2021-01-01

## 2021-01-01 RX ORDER — INSULIN LISPRO 100 [IU]/ML
0-6 INJECTION, SOLUTION INTRAVENOUS; SUBCUTANEOUS
Status: DISCONTINUED | OUTPATIENT
Start: 2021-01-01 | End: 2021-01-01 | Stop reason: HOSPADM

## 2021-01-01 RX ORDER — AMLODIPINE BESYLATE 10 MG/1
10 TABLET ORAL DAILY
Qty: 90 TABLET | Refills: 1 | Status: SHIPPED | OUTPATIENT
Start: 2021-01-01 | End: 2021-01-01

## 2021-01-01 RX ORDER — AMLODIPINE BESYLATE 10 MG/1
10 TABLET ORAL DAILY
Qty: 30 TABLET | Refills: 0 | Status: SHIPPED | OUTPATIENT
Start: 2021-01-01 | End: 2021-01-01 | Stop reason: SDUPTHER

## 2021-01-01 RX ORDER — DIPHENHYDRAMINE HYDROCHLORIDE 50 MG/ML
12.5 INJECTION INTRAMUSCULAR; INTRAVENOUS ONCE
Status: COMPLETED | OUTPATIENT
Start: 2021-01-01 | End: 2021-01-01

## 2021-01-01 RX ORDER — HYDRALAZINE HYDROCHLORIDE 20 MG/ML
20 INJECTION INTRAMUSCULAR; INTRAVENOUS EVERY 4 HOURS PRN
Status: DISCONTINUED | OUTPATIENT
Start: 2021-01-01 | End: 2021-01-01 | Stop reason: HOSPADM

## 2021-01-01 RX ORDER — LISINOPRIL 20 MG/1
20 TABLET ORAL ONCE
Status: COMPLETED | OUTPATIENT
Start: 2021-01-01 | End: 2021-01-01

## 2021-01-01 RX ORDER — ATORVASTATIN CALCIUM 40 MG/1
40 TABLET, FILM COATED ORAL NIGHTLY
Status: DISCONTINUED | OUTPATIENT
Start: 2021-01-01 | End: 2021-01-01 | Stop reason: HOSPADM

## 2021-01-01 RX ORDER — DEXTROSE MONOHYDRATE 25 G/50ML
12.5 INJECTION, SOLUTION INTRAVENOUS PRN
Status: DISCONTINUED | OUTPATIENT
Start: 2021-01-01 | End: 2021-01-01 | Stop reason: HOSPADM

## 2021-01-01 RX ORDER — LISINOPRIL 20 MG/1
20 TABLET ORAL DAILY
Status: DISCONTINUED | OUTPATIENT
Start: 2021-01-01 | End: 2021-01-01

## 2021-01-01 RX ORDER — ATORVASTATIN CALCIUM 40 MG/1
40 TABLET, FILM COATED ORAL NIGHTLY
Qty: 90 TABLET | Refills: 1 | Status: ON HOLD | OUTPATIENT
Start: 2021-01-01 | End: 2021-01-01 | Stop reason: HOSPADM

## 2021-01-01 RX ORDER — PROMETHAZINE HYDROCHLORIDE 25 MG/1
12.5 TABLET ORAL EVERY 6 HOURS PRN
Status: DISCONTINUED | OUTPATIENT
Start: 2021-01-01 | End: 2021-01-01

## 2021-01-01 RX ORDER — 3% SODIUM CHLORIDE 3 G/100ML
45-90 INJECTION, SOLUTION INTRAVENOUS CONTINUOUS
Status: DISCONTINUED | OUTPATIENT
Start: 2021-01-01 | End: 2021-01-01

## 2021-01-01 RX ORDER — LOSARTAN POTASSIUM 25 MG/1
25 TABLET ORAL DAILY
Status: DISCONTINUED | OUTPATIENT
Start: 2021-01-01 | End: 2021-01-01

## 2021-01-01 RX ORDER — CARVEDILOL 12.5 MG/1
12.5 TABLET ORAL 2 TIMES DAILY
Qty: 180 TABLET | Refills: 1 | Status: SHIPPED | OUTPATIENT
Start: 2021-01-01 | End: 2021-01-01

## 2021-01-01 RX ORDER — POTASSIUM CHLORIDE 7.45 MG/ML
10 INJECTION INTRAVENOUS PRN
Status: DISCONTINUED | OUTPATIENT
Start: 2021-01-01 | End: 2021-01-01

## 2021-01-01 RX ORDER — ATORVASTATIN CALCIUM 40 MG/1
80 TABLET, FILM COATED ORAL NIGHTLY
Status: DISCONTINUED | OUTPATIENT
Start: 2021-01-01 | End: 2021-01-01 | Stop reason: HOSPADM

## 2021-01-01 RX ORDER — POTASSIUM CHLORIDE 20 MEQ/1
40 TABLET, EXTENDED RELEASE ORAL PRN
Status: DISCONTINUED | OUTPATIENT
Start: 2021-01-01 | End: 2021-01-01

## 2021-01-01 RX ORDER — ATORVASTATIN CALCIUM 40 MG/1
40 TABLET, FILM COATED ORAL NIGHTLY
Qty: 30 TABLET | Refills: 0 | Status: SHIPPED | OUTPATIENT
Start: 2021-01-01 | End: 2021-01-01

## 2021-01-01 RX ORDER — PANTOPRAZOLE SODIUM 40 MG/1
40 TABLET, DELAYED RELEASE ORAL
Status: DISCONTINUED | OUTPATIENT
Start: 2021-01-01 | End: 2021-01-01 | Stop reason: HOSPADM

## 2021-01-01 RX ORDER — ATORVASTATIN CALCIUM 80 MG/1
80 TABLET, FILM COATED ORAL NIGHTLY
Status: DISCONTINUED | OUTPATIENT
Start: 2021-01-01 | End: 2021-01-01 | Stop reason: HOSPADM

## 2021-01-01 RX ORDER — NICOTINE POLACRILEX 4 MG
15 LOZENGE BUCCAL PRN
Status: DISCONTINUED | OUTPATIENT
Start: 2021-01-01 | End: 2021-01-01 | Stop reason: HOSPADM

## 2021-01-01 RX ORDER — LOSARTAN POTASSIUM 25 MG/1
12.5 TABLET ORAL DAILY
Status: DISCONTINUED | OUTPATIENT
Start: 2021-01-01 | End: 2021-01-01 | Stop reason: HOSPADM

## 2021-01-01 RX ORDER — ATORVASTATIN CALCIUM 40 MG/1
40 TABLET, FILM COATED ORAL NIGHTLY
Qty: 30 TABLET | Refills: 0 | Status: SHIPPED | OUTPATIENT
Start: 2021-01-01 | End: 2021-01-01 | Stop reason: SDUPTHER

## 2021-01-01 RX ORDER — POTASSIUM CHLORIDE 7.45 MG/ML
10 INJECTION INTRAVENOUS PRN
Status: DISCONTINUED | OUTPATIENT
Start: 2021-01-01 | End: 2021-01-01 | Stop reason: HOSPADM

## 2021-01-01 RX ORDER — POTASSIUM CHLORIDE 7.45 MG/ML
10 INJECTION INTRAVENOUS
Status: DISCONTINUED | OUTPATIENT
Start: 2021-01-01 | End: 2021-01-01

## 2021-01-01 RX ORDER — LABETALOL HYDROCHLORIDE 5 MG/ML
10 INJECTION, SOLUTION INTRAVENOUS EVERY 10 MIN PRN
Status: DISCONTINUED | OUTPATIENT
Start: 2021-01-01 | End: 2021-01-01

## 2021-01-01 RX ORDER — ASPIRIN 81 MG/1
81 TABLET, CHEWABLE ORAL DAILY
Qty: 90 TABLET | Refills: 1 | Status: SHIPPED | OUTPATIENT
Start: 2021-01-01 | End: 2021-01-01

## 2021-01-01 RX ORDER — POTASSIUM CHLORIDE 20 MEQ/1
40 TABLET, EXTENDED RELEASE ORAL 3 TIMES DAILY
Status: DISCONTINUED | OUTPATIENT
Start: 2021-01-01 | End: 2021-01-01

## 2021-01-01 RX ORDER — POTASSIUM CHLORIDE 20 MEQ/1
60 TABLET, EXTENDED RELEASE ORAL ONCE
Status: COMPLETED | OUTPATIENT
Start: 2021-01-01 | End: 2021-01-01

## 2021-01-01 RX ORDER — CARVEDILOL 12.5 MG/1
12.5 TABLET ORAL 2 TIMES DAILY
Qty: 60 TABLET | Refills: 1 | Status: SHIPPED | OUTPATIENT
Start: 2021-01-01 | End: 2021-01-01 | Stop reason: SDUPTHER

## 2021-01-01 RX ORDER — ASPIRIN 300 MG/1
300 SUPPOSITORY RECTAL DAILY
Status: DISCONTINUED | OUTPATIENT
Start: 2021-01-01 | End: 2021-01-01 | Stop reason: HOSPADM

## 2021-01-01 RX ORDER — LABETALOL HYDROCHLORIDE 5 MG/ML
10 INJECTION, SOLUTION INTRAVENOUS EVERY 4 HOURS PRN
Status: DISCONTINUED | OUTPATIENT
Start: 2021-01-01 | End: 2021-01-01

## 2021-01-01 RX ORDER — MAGNESIUM SULFATE IN WATER 40 MG/ML
2000 INJECTION, SOLUTION INTRAVENOUS PRN
Status: DISCONTINUED | OUTPATIENT
Start: 2021-01-01 | End: 2021-01-01 | Stop reason: HOSPADM

## 2021-01-01 RX ORDER — SODIUM CHLORIDE 9 MG/ML
INJECTION, SOLUTION INTRAVENOUS CONTINUOUS
Status: DISCONTINUED | OUTPATIENT
Start: 2021-01-01 | End: 2021-01-01

## 2021-01-01 RX ORDER — FUROSEMIDE 10 MG/ML
80 INJECTION INTRAMUSCULAR; INTRAVENOUS ONCE
Status: COMPLETED | OUTPATIENT
Start: 2021-01-01 | End: 2021-01-01

## 2021-01-01 RX ORDER — SODIUM CHLORIDE 0.9 % (FLUSH) 0.9 %
5-40 SYRINGE (ML) INJECTION EVERY 12 HOURS SCHEDULED
Status: DISCONTINUED | OUTPATIENT
Start: 2021-01-01 | End: 2021-01-01

## 2021-01-01 RX ORDER — ASPIRIN 81 MG/1
81 TABLET, CHEWABLE ORAL DAILY
Qty: 30 TABLET | Refills: 0 | Status: SHIPPED | OUTPATIENT
Start: 2021-01-01 | End: 2021-01-01 | Stop reason: SDUPTHER

## 2021-01-01 RX ORDER — POTASSIUM CHLORIDE 20 MEQ/1
40 TABLET, EXTENDED RELEASE ORAL ONCE
Status: COMPLETED | OUTPATIENT
Start: 2021-01-01 | End: 2021-01-01

## 2021-01-01 RX ORDER — LABETALOL HYDROCHLORIDE 5 MG/ML
10 INJECTION, SOLUTION INTRAVENOUS EVERY 6 HOURS PRN
Status: DISCONTINUED | OUTPATIENT
Start: 2021-01-01 | End: 2021-01-01

## 2021-01-01 RX ORDER — MAGNESIUM SULFATE IN WATER 40 MG/ML
2000 INJECTION, SOLUTION INTRAVENOUS ONCE
Status: DISCONTINUED | OUTPATIENT
Start: 2021-01-01 | End: 2021-01-01 | Stop reason: HOSPADM

## 2021-01-01 RX ORDER — CARVEDILOL 6.25 MG/1
12.5 TABLET ORAL 2 TIMES DAILY WITH MEALS
Status: DISCONTINUED | OUTPATIENT
Start: 2021-01-01 | End: 2021-01-01 | Stop reason: HOSPADM

## 2021-01-01 RX ORDER — LISINOPRIL 20 MG/1
20 TABLET ORAL DAILY
Qty: 30 TABLET | Refills: 0 | Status: SHIPPED | OUTPATIENT
Start: 2021-01-01 | End: 2021-01-01 | Stop reason: ALTCHOICE

## 2021-01-01 RX ORDER — AMLODIPINE BESYLATE 10 MG/1
10 TABLET ORAL DAILY
Qty: 30 TABLET | Refills: 1 | Status: SHIPPED | OUTPATIENT
Start: 2021-01-01 | End: 2021-01-01 | Stop reason: SDUPTHER

## 2021-01-01 RX ORDER — LISINOPRIL AND HYDROCHLOROTHIAZIDE 25; 20 MG/1; MG/1
1 TABLET ORAL DAILY
Qty: 30 TABLET | Refills: 1 | Status: SHIPPED | OUTPATIENT
Start: 2021-01-01 | End: 2021-01-01

## 2021-01-01 RX ORDER — ASPIRIN 81 MG/1
81 TABLET ORAL DAILY
Status: DISCONTINUED | OUTPATIENT
Start: 2021-01-01 | End: 2021-01-01 | Stop reason: HOSPADM

## 2021-01-01 RX ORDER — SODIUM CHLORIDE 0.9 % (FLUSH) 0.9 %
5-40 SYRINGE (ML) INJECTION PRN
Status: DISCONTINUED | OUTPATIENT
Start: 2021-01-01 | End: 2021-01-01

## 2021-01-01 RX ORDER — DEXTROSE MONOHYDRATE 50 MG/ML
INJECTION, SOLUTION INTRAVENOUS CONTINUOUS
Status: DISCONTINUED | OUTPATIENT
Start: 2021-01-01 | End: 2021-01-01

## 2021-01-01 RX ORDER — SODIUM CHLORIDE 9 MG/ML
INJECTION, SOLUTION INTRAVENOUS CONTINUOUS
Status: ACTIVE | OUTPATIENT
Start: 2021-01-01 | End: 2021-01-01

## 2021-01-01 RX ORDER — LABETALOL HYDROCHLORIDE 5 MG/ML
10 INJECTION, SOLUTION INTRAVENOUS EVERY 4 HOURS PRN
Status: DISCONTINUED | OUTPATIENT
Start: 2021-01-01 | End: 2021-01-01 | Stop reason: HOSPADM

## 2021-01-01 RX ORDER — METOCLOPRAMIDE HYDROCHLORIDE 5 MG/ML
10 INJECTION INTRAMUSCULAR; INTRAVENOUS ONCE
Status: COMPLETED | OUTPATIENT
Start: 2021-01-01 | End: 2021-01-01

## 2021-01-01 RX ORDER — SODIUM CHLORIDE 0.9 % (FLUSH) 0.9 %
10 SYRINGE (ML) INJECTION EVERY 12 HOURS SCHEDULED
Status: DISCONTINUED | OUTPATIENT
Start: 2021-01-01 | End: 2021-01-01 | Stop reason: HOSPADM

## 2021-01-01 RX ORDER — AMLODIPINE BESYLATE 5 MG/1
5 TABLET ORAL DAILY
Status: DISCONTINUED | OUTPATIENT
Start: 2021-10-18 | End: 2021-01-01 | Stop reason: HOSPADM

## 2021-01-01 RX ORDER — DEXTROSE MONOHYDRATE 50 MG/ML
100 INJECTION, SOLUTION INTRAVENOUS PRN
Status: DISCONTINUED | OUTPATIENT
Start: 2021-01-01 | End: 2021-01-01 | Stop reason: HOSPADM

## 2021-01-01 RX ORDER — CARVEDILOL 12.5 MG/1
12.5 TABLET ORAL 2 TIMES DAILY
Qty: 60 TABLET | Refills: 0 | Status: SHIPPED | OUTPATIENT
Start: 2021-01-01 | End: 2021-01-01 | Stop reason: SDUPTHER

## 2021-01-01 RX ORDER — PROMETHAZINE HYDROCHLORIDE 25 MG/1
12.5 TABLET ORAL EVERY 6 HOURS PRN
Status: DISCONTINUED | OUTPATIENT
Start: 2021-01-01 | End: 2021-01-01 | Stop reason: HOSPADM

## 2021-01-01 RX ORDER — SENNA PLUS 8.6 MG/1
1 TABLET ORAL DAILY PRN
Status: DISCONTINUED | OUTPATIENT
Start: 2021-01-01 | End: 2021-01-01 | Stop reason: HOSPADM

## 2021-01-01 RX ORDER — ASPIRIN 81 MG/1
81 TABLET, CHEWABLE ORAL DAILY
Qty: 30 TABLET | Refills: 1 | Status: SHIPPED | OUTPATIENT
Start: 2021-01-01 | End: 2021-01-01 | Stop reason: SDUPTHER

## 2021-01-01 RX ORDER — MECLIZINE HYDROCHLORIDE 25 MG/1
25 TABLET ORAL 3 TIMES DAILY PRN
Qty: 30 TABLET | Refills: 0 | Status: SHIPPED | OUTPATIENT
Start: 2021-01-01 | End: 2021-01-01

## 2021-01-01 RX ORDER — IPRATROPIUM BROMIDE AND ALBUTEROL SULFATE 2.5; .5 MG/3ML; MG/3ML
1 SOLUTION RESPIRATORY (INHALATION) EVERY 4 HOURS PRN
Status: DISCONTINUED | OUTPATIENT
Start: 2021-01-01 | End: 2021-01-01 | Stop reason: HOSPADM

## 2021-01-01 RX ORDER — MAGNESIUM SULFATE IN WATER 40 MG/ML
4000 INJECTION, SOLUTION INTRAVENOUS ONCE
Status: COMPLETED | OUTPATIENT
Start: 2021-01-01 | End: 2021-01-01

## 2021-01-01 RX ORDER — INSULIN LISPRO 100 [IU]/ML
0-3 INJECTION, SOLUTION INTRAVENOUS; SUBCUTANEOUS NIGHTLY
Status: DISCONTINUED | OUTPATIENT
Start: 2021-01-01 | End: 2021-01-01 | Stop reason: HOSPADM

## 2021-01-01 RX ADMIN — ASPIRIN 81 MG: 81 TABLET, COATED ORAL at 12:24

## 2021-01-01 RX ADMIN — ATORVASTATIN CALCIUM 80 MG: 40 TABLET, FILM COATED ORAL at 20:28

## 2021-01-01 RX ADMIN — HYDRALAZINE HYDROCHLORIDE 10 MG: 20 INJECTION, SOLUTION INTRAMUSCULAR; INTRAVENOUS at 01:26

## 2021-01-01 RX ADMIN — CARVEDILOL 12.5 MG: 6.25 TABLET, FILM COATED ORAL at 08:49

## 2021-01-01 RX ADMIN — ASPIRIN 81 MG: 81 TABLET, CHEWABLE ORAL at 08:26

## 2021-01-01 RX ADMIN — ENOXAPARIN SODIUM 40 MG: 40 INJECTION SUBCUTANEOUS at 11:05

## 2021-01-01 RX ADMIN — ASPIRIN 81 MG: 81 TABLET, CHEWABLE ORAL at 11:05

## 2021-01-01 RX ADMIN — MAGNESIUM SULFATE HEPTAHYDRATE 4000 MG: 40 INJECTION, SOLUTION INTRAVENOUS at 23:47

## 2021-01-01 RX ADMIN — ATORVASTATIN CALCIUM 80 MG: 40 TABLET, FILM COATED ORAL at 22:17

## 2021-01-01 RX ADMIN — AMLODIPINE BESYLATE 10 MG: 5 TABLET ORAL at 12:10

## 2021-01-01 RX ADMIN — LABETALOL HYDROCHLORIDE 10 MG: 5 INJECTION, SOLUTION INTRAVENOUS at 01:46

## 2021-01-01 RX ADMIN — HYDROCODONE BITARTRATE AND ACETAMINOPHEN 1 TABLET: 5; 325 TABLET ORAL at 14:06

## 2021-01-01 RX ADMIN — ACETAMINOPHEN 650 MG: 325 TABLET, FILM COATED ORAL at 20:54

## 2021-01-01 RX ADMIN — ASPIRIN 81 MG: 81 TABLET, COATED ORAL at 10:43

## 2021-01-01 RX ADMIN — DIPHENHYDRAMINE HYDROCHLORIDE 12.5 MG: 50 INJECTION, SOLUTION INTRAMUSCULAR; INTRAVENOUS at 23:39

## 2021-01-01 RX ADMIN — ACETAMINOPHEN 650 MG: 650 SUPPOSITORY RECTAL at 05:08

## 2021-01-01 RX ADMIN — AMLODIPINE BESYLATE 10 MG: 5 TABLET ORAL at 08:02

## 2021-01-01 RX ADMIN — HYDRALAZINE HYDROCHLORIDE 10 MG: 20 INJECTION INTRAMUSCULAR; INTRAVENOUS at 15:07

## 2021-01-01 RX ADMIN — INSULIN LISPRO 1 UNITS: 100 INJECTION, SOLUTION INTRAVENOUS; SUBCUTANEOUS at 18:34

## 2021-01-01 RX ADMIN — LISINOPRIL 20 MG: 20 TABLET ORAL at 12:44

## 2021-01-01 RX ADMIN — ATORVASTATIN CALCIUM 40 MG: 40 TABLET, FILM COATED ORAL at 20:35

## 2021-01-01 RX ADMIN — LABETALOL HYDROCHLORIDE 10 MG: 5 INJECTION, SOLUTION INTRAVENOUS at 14:57

## 2021-01-01 RX ADMIN — CARVEDILOL 6.25 MG: 6.25 TABLET, FILM COATED ORAL at 09:22

## 2021-01-01 RX ADMIN — HYDRALAZINE HYDROCHLORIDE 20 MG: 20 INJECTION INTRAMUSCULAR; INTRAVENOUS at 16:55

## 2021-01-01 RX ADMIN — CARVEDILOL 12.5 MG: 6.25 TABLET, FILM COATED ORAL at 10:43

## 2021-01-01 RX ADMIN — SODIUM CHLORIDE 25 ML/HR: 3 INJECTION, SOLUTION INTRAVENOUS at 03:00

## 2021-01-01 RX ADMIN — FAMOTIDINE 20 MG: 10 INJECTION INTRAVENOUS at 09:42

## 2021-01-01 RX ADMIN — SODIUM CHLORIDE 500 ML: 9 INJECTION, SOLUTION INTRAVENOUS at 19:33

## 2021-01-01 RX ADMIN — CARVEDILOL 6.25 MG: 6.25 TABLET, FILM COATED ORAL at 15:44

## 2021-01-01 RX ADMIN — MAGNESIUM SULFATE HEPTAHYDRATE 2000 MG: 40 INJECTION, SOLUTION INTRAVENOUS at 23:40

## 2021-01-01 RX ADMIN — AMLODIPINE BESYLATE 10 MG: 5 TABLET ORAL at 08:09

## 2021-01-01 RX ADMIN — ACETAMINOPHEN 650 MG: 325 TABLET, FILM COATED ORAL at 09:45

## 2021-01-01 RX ADMIN — ACETAMINOPHEN 650 MG: 325 TABLET ORAL at 20:46

## 2021-01-01 RX ADMIN — MUPIROCIN: 20 OINTMENT TOPICAL at 08:20

## 2021-01-01 RX ADMIN — CARVEDILOL 12.5 MG: 6.25 TABLET, FILM COATED ORAL at 15:40

## 2021-01-01 RX ADMIN — LORAZEPAM 1 MG: 2 INJECTION INTRAMUSCULAR; INTRAVENOUS at 23:03

## 2021-01-01 RX ADMIN — ATORVASTATIN CALCIUM 80 MG: 80 TABLET, FILM COATED ORAL at 21:23

## 2021-01-01 RX ADMIN — MAGNESIUM SULFATE HEPTAHYDRATE 2000 MG: 40 INJECTION, SOLUTION INTRAVENOUS at 10:05

## 2021-01-01 RX ADMIN — Medication 10 ML: at 20:25

## 2021-01-01 RX ADMIN — ENOXAPARIN SODIUM 40 MG: 40 INJECTION SUBCUTANEOUS at 09:00

## 2021-01-01 RX ADMIN — CARVEDILOL 12.5 MG: 6.25 TABLET, FILM COATED ORAL at 11:21

## 2021-01-01 RX ADMIN — POTASSIUM CHLORIDE 40 MEQ: 20 TABLET, EXTENDED RELEASE ORAL at 13:09

## 2021-01-01 RX ADMIN — CARVEDILOL 12.5 MG: 6.25 TABLET, FILM COATED ORAL at 16:49

## 2021-01-01 RX ADMIN — ACETAMINOPHEN 650 MG: 325 TABLET, FILM COATED ORAL at 03:31

## 2021-01-01 RX ADMIN — ATORVASTATIN CALCIUM 80 MG: 40 TABLET, FILM COATED ORAL at 02:03

## 2021-01-01 RX ADMIN — CARVEDILOL 12.5 MG: 6.25 TABLET, FILM COATED ORAL at 18:06

## 2021-01-01 RX ADMIN — CARVEDILOL 12.5 MG: 6.25 TABLET, FILM COATED ORAL at 09:53

## 2021-01-01 RX ADMIN — CARVEDILOL 12.5 MG: 6.25 TABLET, FILM COATED ORAL at 09:47

## 2021-01-01 RX ADMIN — CARVEDILOL 12.5 MG: 6.25 TABLET, FILM COATED ORAL at 18:32

## 2021-01-01 RX ADMIN — POTASSIUM CHLORIDE 40 MEQ: 20 TABLET, EXTENDED RELEASE ORAL at 12:11

## 2021-01-01 RX ADMIN — SODIUM CHLORIDE: 4.5 INJECTION, SOLUTION INTRAVENOUS at 08:48

## 2021-01-01 RX ADMIN — ACETAMINOPHEN 650 MG: 325 TABLET, FILM COATED ORAL at 20:28

## 2021-01-01 RX ADMIN — AMLODIPINE BESYLATE 10 MG: 5 TABLET ORAL at 12:23

## 2021-01-01 RX ADMIN — TETROFOSMIN 32 MILLICURIE: 1.38 INJECTION, POWDER, LYOPHILIZED, FOR SOLUTION INTRAVENOUS at 09:06

## 2021-01-01 RX ADMIN — CARVEDILOL 12.5 MG: 6.25 TABLET, FILM COATED ORAL at 18:25

## 2021-01-01 RX ADMIN — TETROFOSMIN 10.4 MILLICURIE: 1.38 INJECTION, POWDER, LYOPHILIZED, FOR SOLUTION INTRAVENOUS at 07:36

## 2021-01-01 RX ADMIN — ACETAMINOPHEN 650 MG: 325 TABLET, FILM COATED ORAL at 20:23

## 2021-01-01 RX ADMIN — POTASSIUM CHLORIDE 60 MEQ: 20 TABLET, EXTENDED RELEASE ORAL at 20:13

## 2021-01-01 RX ADMIN — ATORVASTATIN CALCIUM 80 MG: 40 TABLET, FILM COATED ORAL at 23:22

## 2021-01-01 RX ADMIN — SODIUM CHLORIDE 25 ML/HR: 3 INJECTION, SOLUTION INTRAVENOUS at 15:03

## 2021-01-01 RX ADMIN — CARVEDILOL 12.5 MG: 6.25 TABLET, FILM COATED ORAL at 16:03

## 2021-01-01 RX ADMIN — ASPIRIN 81 MG: 81 TABLET, COATED ORAL at 08:02

## 2021-01-01 RX ADMIN — ASPIRIN 81 MG: 81 TABLET, COATED ORAL at 08:47

## 2021-01-01 RX ADMIN — LABETALOL HYDROCHLORIDE 10 MG: 5 INJECTION, SOLUTION INTRAVENOUS at 04:43

## 2021-01-01 RX ADMIN — AMLODIPINE BESYLATE 10 MG: 5 TABLET ORAL at 10:43

## 2021-01-01 RX ADMIN — CARVEDILOL 12.5 MG: 6.25 TABLET, FILM COATED ORAL at 08:26

## 2021-01-01 RX ADMIN — INSULIN LISPRO 1 UNITS: 100 INJECTION, SOLUTION INTRAVENOUS; SUBCUTANEOUS at 21:24

## 2021-01-01 RX ADMIN — AMLODIPINE BESYLATE 10 MG: 5 TABLET ORAL at 17:46

## 2021-01-01 RX ADMIN — MAGNESIUM SULFATE HEPTAHYDRATE 2000 MG: 40 INJECTION, SOLUTION INTRAVENOUS at 15:18

## 2021-01-01 RX ADMIN — ENOXAPARIN SODIUM 40 MG: 40 INJECTION SUBCUTANEOUS at 08:52

## 2021-01-01 RX ADMIN — ASPIRIN 81 MG: 81 TABLET, COATED ORAL at 09:35

## 2021-01-01 RX ADMIN — AMLODIPINE BESYLATE 10 MG: 5 TABLET ORAL at 08:19

## 2021-01-01 RX ADMIN — HYDRALAZINE HYDROCHLORIDE 20 MG: 20 INJECTION INTRAMUSCULAR; INTRAVENOUS at 17:46

## 2021-01-01 RX ADMIN — CARVEDILOL 12.5 MG: 6.25 TABLET, FILM COATED ORAL at 08:09

## 2021-01-01 RX ADMIN — ATORVASTATIN CALCIUM 80 MG: 40 TABLET, FILM COATED ORAL at 20:54

## 2021-01-01 RX ADMIN — AMLODIPINE BESYLATE 10 MG: 5 TABLET ORAL at 12:11

## 2021-01-01 RX ADMIN — LISINOPRIL 20 MG: 20 TABLET ORAL at 08:09

## 2021-01-01 RX ADMIN — SODIUM CHLORIDE: 9 INJECTION, SOLUTION INTRAVENOUS at 15:54

## 2021-01-01 RX ADMIN — Medication 10 ML: at 21:27

## 2021-01-01 RX ADMIN — SODIUM CHLORIDE, PRESERVATIVE FREE 10 ML: 5 INJECTION INTRAVENOUS at 08:23

## 2021-01-01 RX ADMIN — LISINOPRIL 20 MG: 20 TABLET ORAL at 11:37

## 2021-01-01 RX ADMIN — CARVEDILOL 12.5 MG: 6.25 TABLET, FILM COATED ORAL at 17:27

## 2021-01-01 RX ADMIN — IOPAMIDOL 75 ML: 755 INJECTION, SOLUTION INTRAVENOUS at 19:27

## 2021-01-01 RX ADMIN — ASPIRIN 81 MG: 81 TABLET, CHEWABLE ORAL at 15:44

## 2021-01-01 RX ADMIN — FAMOTIDINE 20 MG: 10 INJECTION INTRAVENOUS at 08:52

## 2021-01-01 RX ADMIN — ATORVASTATIN CALCIUM 40 MG: 40 TABLET, FILM COATED ORAL at 22:45

## 2021-01-01 RX ADMIN — CARVEDILOL 12.5 MG: 6.25 TABLET, FILM COATED ORAL at 12:10

## 2021-01-01 RX ADMIN — INSULIN LISPRO 1 UNITS: 100 INJECTION, SOLUTION INTRAVENOUS; SUBCUTANEOUS at 13:02

## 2021-01-01 RX ADMIN — CARVEDILOL 12.5 MG: 6.25 TABLET, FILM COATED ORAL at 16:17

## 2021-01-01 RX ADMIN — ASPIRIN 81 MG: 81 TABLET, CHEWABLE ORAL at 08:09

## 2021-01-01 RX ADMIN — LORAZEPAM 1 MG: 2 INJECTION INTRAMUSCULAR; INTRAVENOUS at 02:35

## 2021-01-01 RX ADMIN — GADOTERIDOL 20 ML: 279.3 INJECTION, SOLUTION INTRAVENOUS at 22:44

## 2021-01-01 RX ADMIN — POTASSIUM CHLORIDE 40 MEQ: 20 TABLET, EXTENDED RELEASE ORAL at 20:18

## 2021-01-01 RX ADMIN — FUROSEMIDE 10 MG: 10 INJECTION, SOLUTION INTRAMUSCULAR; INTRAVENOUS at 22:45

## 2021-01-01 RX ADMIN — POTASSIUM CHLORIDE 10 MEQ: 7.46 INJECTION, SOLUTION INTRAVENOUS at 01:29

## 2021-01-01 RX ADMIN — CARVEDILOL 12.5 MG: 6.25 TABLET, FILM COATED ORAL at 16:59

## 2021-01-01 RX ADMIN — Medication 10 ML: at 08:12

## 2021-01-01 RX ADMIN — ATORVASTATIN CALCIUM 80 MG: 80 TABLET, FILM COATED ORAL at 21:33

## 2021-01-01 RX ADMIN — SODIUM CHLORIDE, PRESERVATIVE FREE 10 ML: 5 INJECTION INTRAVENOUS at 21:21

## 2021-01-01 RX ADMIN — LORAZEPAM 1 MG: 2 INJECTION INTRAMUSCULAR; INTRAVENOUS at 03:29

## 2021-01-01 RX ADMIN — REGADENOSON 0.4 MG: 0.08 INJECTION, SOLUTION INTRAVENOUS at 09:06

## 2021-01-01 RX ADMIN — ENOXAPARIN SODIUM 40 MG: 40 INJECTION SUBCUTANEOUS at 08:34

## 2021-01-01 RX ADMIN — LOSARTAN POTASSIUM 12.5 MG: 25 TABLET, FILM COATED ORAL at 08:47

## 2021-01-01 RX ADMIN — FAMOTIDINE 20 MG: 10 INJECTION INTRAVENOUS at 11:05

## 2021-01-01 RX ADMIN — AMLODIPINE BESYLATE 10 MG: 10 TABLET ORAL at 08:26

## 2021-01-01 RX ADMIN — POTASSIUM CHLORIDE 10 MEQ: 7.46 INJECTION, SOLUTION INTRAVENOUS at 03:05

## 2021-01-01 RX ADMIN — MUPIROCIN: 20 OINTMENT TOPICAL at 08:11

## 2021-01-01 RX ADMIN — POTASSIUM CHLORIDE 40 MEQ: 20 TABLET, EXTENDED RELEASE ORAL at 10:01

## 2021-01-01 RX ADMIN — ASPIRIN 81 MG: 81 TABLET, COATED ORAL at 08:19

## 2021-01-01 RX ADMIN — POTASSIUM CHLORIDE 40 MEQ: 1500 TABLET, EXTENDED RELEASE ORAL at 12:44

## 2021-01-01 RX ADMIN — LABETALOL HYDROCHLORIDE 10 MG: 5 INJECTION INTRAVENOUS at 16:43

## 2021-01-01 RX ADMIN — AMLODIPINE BESYLATE 10 MG: 5 TABLET ORAL at 09:35

## 2021-01-01 RX ADMIN — POTASSIUM CHLORIDE 10 MEQ: 7.46 INJECTION, SOLUTION INTRAVENOUS at 00:02

## 2021-01-01 RX ADMIN — HYDROCODONE BITARTRATE AND ACETAMINOPHEN 1 TABLET: 5; 325 TABLET ORAL at 02:03

## 2021-01-01 RX ADMIN — LABETALOL HYDROCHLORIDE 10 MG: 5 INJECTION, SOLUTION INTRAVENOUS at 18:07

## 2021-01-01 RX ADMIN — ATORVASTATIN CALCIUM 80 MG: 40 TABLET, FILM COATED ORAL at 20:18

## 2021-01-01 RX ADMIN — CARVEDILOL 12.5 MG: 6.25 TABLET, FILM COATED ORAL at 17:52

## 2021-01-01 RX ADMIN — AMLODIPINE BESYLATE 10 MG: 5 TABLET ORAL at 11:37

## 2021-01-01 RX ADMIN — CHLORTHALIDONE 25 MG: 25 TABLET ORAL at 15:44

## 2021-01-01 RX ADMIN — PANTOPRAZOLE SODIUM 40 MG: 40 TABLET, DELAYED RELEASE ORAL at 08:11

## 2021-01-01 RX ADMIN — CARVEDILOL 12.5 MG: 6.25 TABLET, FILM COATED ORAL at 17:38

## 2021-01-01 RX ADMIN — POTASSIUM CHLORIDE 40 MEQ: 20 TABLET, EXTENDED RELEASE ORAL at 08:19

## 2021-01-01 RX ADMIN — ASPIRIN 81 MG: 81 TABLET, COATED ORAL at 09:47

## 2021-01-01 RX ADMIN — AMLODIPINE BESYLATE 10 MG: 10 TABLET ORAL at 09:42

## 2021-01-01 RX ADMIN — Medication 10 ML: at 20:35

## 2021-01-01 RX ADMIN — LABETALOL HYDROCHLORIDE 10 MG: 5 INJECTION INTRAVENOUS at 20:14

## 2021-01-01 RX ADMIN — SODIUM CHLORIDE: 9 INJECTION, SOLUTION INTRAVENOUS at 23:40

## 2021-01-01 RX ADMIN — AMLODIPINE BESYLATE 10 MG: 5 TABLET ORAL at 08:47

## 2021-01-01 RX ADMIN — CARVEDILOL 12.5 MG: 6.25 TABLET, FILM COATED ORAL at 09:35

## 2021-01-01 RX ADMIN — LORAZEPAM 1 MG: 2 INJECTION INTRAMUSCULAR; INTRAVENOUS at 22:15

## 2021-01-01 RX ADMIN — ATORVASTATIN CALCIUM 40 MG: 40 TABLET, FILM COATED ORAL at 21:27

## 2021-01-01 RX ADMIN — CARVEDILOL 12.5 MG: 6.25 TABLET, FILM COATED ORAL at 12:24

## 2021-01-01 RX ADMIN — FAMOTIDINE 20 MG: 10 INJECTION INTRAVENOUS at 08:27

## 2021-01-01 RX ADMIN — LORAZEPAM 1 MG: 2 INJECTION INTRAMUSCULAR; INTRAVENOUS at 22:00

## 2021-01-01 RX ADMIN — FAMOTIDINE 20 MG: 10 INJECTION INTRAVENOUS at 08:34

## 2021-01-01 RX ADMIN — LORAZEPAM 1 MG: 2 INJECTION INTRAMUSCULAR; INTRAVENOUS at 02:30

## 2021-01-01 RX ADMIN — ENOXAPARIN SODIUM 40 MG: 40 INJECTION SUBCUTANEOUS at 21:06

## 2021-01-01 RX ADMIN — HYDRALAZINE HYDROCHLORIDE 10 MG: 20 INJECTION, SOLUTION INTRAMUSCULAR; INTRAVENOUS at 02:54

## 2021-01-01 RX ADMIN — Medication 10 ML: at 11:38

## 2021-01-01 RX ADMIN — CARVEDILOL 12.5 MG: 6.25 TABLET, FILM COATED ORAL at 17:26

## 2021-01-01 RX ADMIN — IOPAMIDOL 75 ML: 755 INJECTION, SOLUTION INTRAVENOUS at 13:11

## 2021-01-01 RX ADMIN — ASPIRIN 81 MG: 81 TABLET, CHEWABLE ORAL at 11:37

## 2021-01-01 RX ADMIN — POTASSIUM CHLORIDE 40 MEQ: 20 TABLET, EXTENDED RELEASE ORAL at 14:06

## 2021-01-01 RX ADMIN — SODIUM CHLORIDE 65 ML/HR: 3 INJECTION, SOLUTION INTRAVENOUS at 15:56

## 2021-01-01 RX ADMIN — POTASSIUM CHLORIDE 10 MEQ: 7.46 INJECTION, SOLUTION INTRAVENOUS at 04:47

## 2021-01-01 RX ADMIN — INSULIN LISPRO 1 UNITS: 100 INJECTION, SOLUTION INTRAVENOUS; SUBCUTANEOUS at 17:22

## 2021-01-01 RX ADMIN — AMLODIPINE BESYLATE 10 MG: 10 TABLET ORAL at 11:05

## 2021-01-01 RX ADMIN — SODIUM CHLORIDE, PRESERVATIVE FREE 10 ML: 5 INJECTION INTRAVENOUS at 21:15

## 2021-01-01 RX ADMIN — ASPIRIN 324 MG: 81 TABLET, CHEWABLE ORAL at 20:13

## 2021-01-01 RX ADMIN — FAMOTIDINE 20 MG: 10 INJECTION INTRAVENOUS at 08:26

## 2021-01-01 RX ADMIN — ACETAMINOPHEN 650 MG: 325 TABLET, FILM COATED ORAL at 15:54

## 2021-01-01 RX ADMIN — ENOXAPARIN SODIUM 40 MG: 40 INJECTION SUBCUTANEOUS at 08:27

## 2021-01-01 RX ADMIN — ASPIRIN 81 MG: 81 TABLET, CHEWABLE ORAL at 09:42

## 2021-01-01 RX ADMIN — FUROSEMIDE 80 MG: 10 INJECTION, SOLUTION INTRAMUSCULAR; INTRAVENOUS at 08:28

## 2021-01-01 RX ADMIN — FAMOTIDINE 20 MG: 10 INJECTION, SOLUTION INTRAVENOUS at 03:42

## 2021-01-01 RX ADMIN — LISINOPRIL 20 MG: 20 TABLET ORAL at 08:11

## 2021-01-01 RX ADMIN — ASPIRIN 81 MG: 81 TABLET, COATED ORAL at 09:46

## 2021-01-01 RX ADMIN — SODIUM CHLORIDE 25 ML/HR: 3 INJECTION, SOLUTION INTRAVENOUS at 07:48

## 2021-01-01 RX ADMIN — SODIUM CHLORIDE: 9 INJECTION, SOLUTION INTRAVENOUS at 00:09

## 2021-01-01 RX ADMIN — POTASSIUM CHLORIDE 40 MEQ: 1500 TABLET, EXTENDED RELEASE ORAL at 09:32

## 2021-01-01 RX ADMIN — METOCLOPRAMIDE HYDROCHLORIDE 10 MG: 5 INJECTION INTRAMUSCULAR; INTRAVENOUS at 23:39

## 2021-01-01 RX ADMIN — ATORVASTATIN CALCIUM 80 MG: 80 TABLET, FILM COATED ORAL at 22:24

## 2021-01-01 RX ADMIN — CARVEDILOL 12.5 MG: 6.25 TABLET, FILM COATED ORAL at 08:19

## 2021-01-01 RX ADMIN — LOSARTAN POTASSIUM 12.5 MG: 25 TABLET, FILM COATED ORAL at 12:10

## 2021-01-01 RX ADMIN — ENOXAPARIN SODIUM 40 MG: 40 INJECTION SUBCUTANEOUS at 09:49

## 2021-01-01 RX ADMIN — POTASSIUM CHLORIDE 40 MEQ: 10 TABLET, EXTENDED RELEASE ORAL at 15:40

## 2021-01-01 RX ADMIN — CARVEDILOL 12.5 MG: 6.25 TABLET, FILM COATED ORAL at 08:02

## 2021-01-01 RX ADMIN — CHLORTHALIDONE 25 MG: 25 TABLET ORAL at 09:35

## 2021-01-01 RX ADMIN — PERFLUTREN 1.65 MG: 6.52 INJECTION, SUSPENSION INTRAVENOUS at 09:11

## 2021-01-01 RX ADMIN — ASPIRIN 81 MG: 81 TABLET, COATED ORAL at 12:10

## 2021-01-01 RX ADMIN — INSULIN LISPRO 1 UNITS: 100 INJECTION, SOLUTION INTRAVENOUS; SUBCUTANEOUS at 23:04

## 2021-01-01 ASSESSMENT — PAIN DESCRIPTION - FREQUENCY
FREQUENCY: CONTINUOUS

## 2021-01-01 ASSESSMENT — PAIN SCALES - PAIN ASSESSMENT IN ADVANCED DEMENTIA (PAINAD)
BREATHING: 0
TOTALSCORE: 0
CONSOLABILITY: 0
TOTALSCORE: 0
BODYLANGUAGE: 0
BREATHING: 0
TOTALSCORE: 1
FACIALEXPRESSION: 0
CONSOLABILITY: 0
NEGVOCALIZATION: 0
FACIALEXPRESSION: 0
CONSOLABILITY: 0
CONSOLABILITY: 0
NEGVOCALIZATION: 0
BODYLANGUAGE: 0
BREATHING: 0
NEGVOCALIZATION: 0
BREATHING: 0
BODYLANGUAGE: 0
CONSOLABILITY: 2
NEGVOCALIZATION: 0
TOTALSCORE: 0
BODYLANGUAGE: 0
FACIALEXPRESSION: 0
CONSOLABILITY: 0
FACIALEXPRESSION: 0
CONSOLABILITY: 0
NEGVOCALIZATION: 0
BODYLANGUAGE: 1
BODYLANGUAGE: 0
TOTALSCORE: 3
CONSOLABILITY: 0
NEGVOCALIZATION: 0
TOTALSCORE: 0
BODYLANGUAGE: 0
TOTALSCORE: 0
NEGVOCALIZATION: 0
TOTALSCORE: 0
NEGVOCALIZATION: 0
BODYLANGUAGE: 0
FACIALEXPRESSION: 0
BREATHING: 0
NEGVOCALIZATION: 0
CONSOLABILITY: 0
NEGVOCALIZATION: 0
TOTALSCORE: 0
CONSOLABILITY: 0
CONSOLABILITY: 0
BODYLANGUAGE: 0
NEGVOCALIZATION: 0
BODYLANGUAGE: 0
CONSOLABILITY: 0
BREATHING: 0
BREATHING: 0
CONSOLABILITY: 0
BREATHING: 0
NEGVOCALIZATION: 0
BODYLANGUAGE: 0
CONSOLABILITY: 0
BODYLANGUAGE: 0
FACIALEXPRESSION: 0
BREATHING: 0
TOTALSCORE: 0
BODYLANGUAGE: 0
BODYLANGUAGE: 0
TOTALSCORE: 0
BREATHING: 0
CONSOLABILITY: 0
TOTALSCORE: 0
FACIALEXPRESSION: 0
BREATHING: 0
CONSOLABILITY: 0
CONSOLABILITY: 0
FACIALEXPRESSION: 0
NEGVOCALIZATION: 0
CONSOLABILITY: 0
BREATHING: 0
TOTALSCORE: 0
TOTALSCORE: 0
NEGVOCALIZATION: 0
CONSOLABILITY: 0
TOTALSCORE: 0
BREATHING: 0
FACIALEXPRESSION: 0
NEGVOCALIZATION: 0
CONSOLABILITY: 0
BREATHING: 0
NEGVOCALIZATION: 0
BODYLANGUAGE: 0
FACIALEXPRESSION: 0
NEGVOCALIZATION: 0
FACIALEXPRESSION: 0
NEGVOCALIZATION: 0
FACIALEXPRESSION: 0
BODYLANGUAGE: 0
BREATHING: 0
TOTALSCORE: 0
FACIALEXPRESSION: 0
CONSOLABILITY: 2
BREATHING: 0
FACIALEXPRESSION: 0
BODYLANGUAGE: 0
BODYLANGUAGE: 0
BREATHING: 0
CONSOLABILITY: 0
NEGVOCALIZATION: 0
FACIALEXPRESSION: 0
BREATHING: 0
FACIALEXPRESSION: 0
NEGVOCALIZATION: 0
FACIALEXPRESSION: 0
FACIALEXPRESSION: 0
CONSOLABILITY: 0
TOTALSCORE: 0
FACIALEXPRESSION: 0
TOTALSCORE: 1
FACIALEXPRESSION: 0
BODYLANGUAGE: 0
CONSOLABILITY: 0
TOTALSCORE: 0
BREATHING: 0
BODYLANGUAGE: 0
BODYLANGUAGE: 1
TOTALSCORE: 0
NEGVOCALIZATION: 0
BREATHING: 0
BREATHING: 0
NEGVOCALIZATION: 0
TOTALSCORE: 0
BREATHING: 0
BODYLANGUAGE: 1
BODYLANGUAGE: 0
TOTALSCORE: 0
BREATHING: 0
BODYLANGUAGE: 1
CONSOLABILITY: 0
FACIALEXPRESSION: 0
TOTALSCORE: 0
BREATHING: 0
CONSOLABILITY: 0
BODYLANGUAGE: 0
BREATHING: 0
BODYLANGUAGE: 0
NEGVOCALIZATION: 0
NEGVOCALIZATION: 0
BREATHING: 0
FACIALEXPRESSION: 0
BREATHING: 0
CONSOLABILITY: 0
BREATHING: 0
NEGVOCALIZATION: 0
BODYLANGUAGE: 0
FACIALEXPRESSION: 0
TOTALSCORE: 3
NEGVOCALIZATION: 0
NEGVOCALIZATION: 0
TOTALSCORE: 0

## 2021-01-01 ASSESSMENT — PAIN SCALES - GENERAL
PAINLEVEL_OUTOF10: 6
PAINLEVEL_OUTOF10: 3
PAINLEVEL_OUTOF10: 0
PAINLEVEL_OUTOF10: 3
PAINLEVEL_OUTOF10: 0
PAINLEVEL_OUTOF10: 0
PAINLEVEL_OUTOF10: 4
PAINLEVEL_OUTOF10: 0
PAINLEVEL_OUTOF10: 0
PAINLEVEL_OUTOF10: 3
PAINLEVEL_OUTOF10: 0
PAINLEVEL_OUTOF10: 8
PAINLEVEL_OUTOF10: 0
PAINLEVEL_OUTOF10: 3
PAINLEVEL_OUTOF10: 0
PAINLEVEL_OUTOF10: 2
PAINLEVEL_OUTOF10: 0
PAINLEVEL_OUTOF10: 0
PAINLEVEL_OUTOF10: 1
PAINLEVEL_OUTOF10: 0
PAINLEVEL_OUTOF10: 0
PAINLEVEL_OUTOF10: 1
PAINLEVEL_OUTOF10: 0
PAINLEVEL_OUTOF10: 2
PAINLEVEL_OUTOF10: 4
PAINLEVEL_OUTOF10: 0
PAINLEVEL_OUTOF10: 6
PAINLEVEL_OUTOF10: 7
PAINLEVEL_OUTOF10: 0
PAINLEVEL_OUTOF10: 0
PAINLEVEL_OUTOF10: 8
PAINLEVEL_OUTOF10: 4
PAINLEVEL_OUTOF10: 0
PAINLEVEL_OUTOF10: 4
PAINLEVEL_OUTOF10: 4
PAINLEVEL_OUTOF10: 0
PAINLEVEL_OUTOF10: 1
PAINLEVEL_OUTOF10: 0
PAINLEVEL_OUTOF10: 0
PAINLEVEL_OUTOF10: 9
PAINLEVEL_OUTOF10: 5
PAINLEVEL_OUTOF10: 2
PAINLEVEL_OUTOF10: 4
PAINLEVEL_OUTOF10: 0
PAINLEVEL_OUTOF10: 5
PAINLEVEL_OUTOF10: 3
PAINLEVEL_OUTOF10: 4
PAINLEVEL_OUTOF10: 0
PAINLEVEL_OUTOF10: 0
PAINLEVEL_OUTOF10: 3
PAINLEVEL_OUTOF10: 0
PAINLEVEL_OUTOF10: 0
PAINLEVEL_OUTOF10: 7
PAINLEVEL_OUTOF10: 0
PAINLEVEL_OUTOF10: 2
PAINLEVEL_OUTOF10: 0
PAINLEVEL_OUTOF10: 0
PAINLEVEL_OUTOF10: 3
PAINLEVEL_OUTOF10: 0

## 2021-01-01 ASSESSMENT — PAIN DESCRIPTION - LOCATION
LOCATION: SHOULDER
LOCATION: BACK
LOCATION: SHOULDER
LOCATION: HEAD
LOCATION: BACK
LOCATION: SHOULDER
LOCATION: HEAD
LOCATION: SHOULDER
LOCATION: CHEST
LOCATION: HEAD
LOCATION: SHOULDER
LOCATION: SHOULDER
LOCATION: HEAD
LOCATION: CHEST

## 2021-01-01 ASSESSMENT — PAIN DESCRIPTION - ONSET: ONSET: PROGRESSIVE

## 2021-01-01 ASSESSMENT — PAIN DESCRIPTION - PAIN TYPE
TYPE: ACUTE PAIN

## 2021-01-01 ASSESSMENT — PAIN DESCRIPTION - DESCRIPTORS
DESCRIPTORS: ACHING
DESCRIPTORS: HEADACHE
DESCRIPTORS: HEADACHE
DESCRIPTORS: ACHING
DESCRIPTORS: ACHING

## 2021-01-01 ASSESSMENT — PAIN - FUNCTIONAL ASSESSMENT
PAIN_FUNCTIONAL_ASSESSMENT: PREVENTS OR INTERFERES SOME ACTIVE ACTIVITIES AND ADLS

## 2021-01-01 ASSESSMENT — PAIN DESCRIPTION - PROGRESSION
CLINICAL_PROGRESSION: NOT CHANGED
CLINICAL_PROGRESSION: NOT CHANGED
CLINICAL_PROGRESSION: GRADUALLY WORSENING
CLINICAL_PROGRESSION: NOT CHANGED

## 2021-01-01 ASSESSMENT — ENCOUNTER SYMPTOMS
SHORTNESS OF BREATH: 0
PHOTOPHOBIA: 0
VOMITING: 0
ABDOMINAL PAIN: 0
COLOR CHANGE: 0
SHORTNESS OF BREATH: 0
BACK PAIN: 0
EYE REDNESS: 0
EYE PAIN: 0

## 2021-01-01 ASSESSMENT — PAIN DESCRIPTION - ORIENTATION
ORIENTATION: RIGHT
ORIENTATION: LOWER
ORIENTATION: RIGHT
ORIENTATION: LOWER
ORIENTATION: RIGHT

## 2021-01-01 ASSESSMENT — PAIN SCALES - WONG BAKER
WONGBAKER_NUMERICALRESPONSE: 0

## 2021-01-01 ASSESSMENT — PATIENT HEALTH QUESTIONNAIRE - PHQ9
2. FEELING DOWN, DEPRESSED OR HOPELESS: 0
SUM OF ALL RESPONSES TO PHQ9 QUESTIONS 1 & 2: 0
SUM OF ALL RESPONSES TO PHQ QUESTIONS 1-9: 0

## 2021-03-01 PROBLEM — E83.42 HYPOMAGNESEMIA: Status: ACTIVE | Noted: 2021-01-01

## 2021-03-01 PROBLEM — E87.6 HYPOKALEMIA: Status: ACTIVE | Noted: 2021-01-01

## 2021-03-01 PROBLEM — E04.1 THYROID NODULE: Status: ACTIVE | Noted: 2021-01-01

## 2021-03-01 PROBLEM — Z72.0 TOBACCO ABUSE: Status: ACTIVE | Noted: 2021-01-01

## 2021-03-01 PROBLEM — I16.0 MALIGNANT HYPERTENSIVE URGENCY: Status: ACTIVE | Noted: 2021-01-01

## 2021-03-01 PROBLEM — R55 SYNCOPE: Status: ACTIVE | Noted: 2021-01-01

## 2021-03-01 PROBLEM — I16.0 HYPERTENSIVE URGENCY, MALIGNANT: Status: ACTIVE | Noted: 2021-01-01

## 2021-03-01 NOTE — ED PROVIDER NOTES
[address] [facility]  ED    CHIEF COMPLAINT  Hypertension (pt reporting a syncopical episode over the weekend but reports she refused transport to the ED. Went to PCP today due to the episode and was sent to the ED due to hypertension. )    HISTORY OF PRESENT ILLNESS  Carey Roland is a 64 y.o. female who presents to the ED complaining of HTN. Friday at work she got dizzy, had tunnel vision and sat down and it went away. Saturday she was eating dinner at a party, tunnel vision happened again, next thing she knew she was on the floor. She vomited and lost control of her bladder. Family reported she was shaking and her eyes rolled back in her head. EMS called but she was fine then and did not want to go to hospital. She did report this lasted all of 30 minutes and she felt totally fine and felt fine since. Does not check BP regularly anymore and has not in the past month. She called PCP and was in their office with elevated BP ands sent here. For the past 2 weeks she has been getting a slight headache off an on. Reports it hurts a little bit and then goes. No further episodes of the tunnel vision, dizziness and/or the syncopal episode. She is not currently on BP meds, she has been on them off and on. She had been checking her BP at home in the past and it was normal. When she was in the MD office it was significantly elevated. She did have a stress test and saw cardiology and was told everything with her heart is normal.     The patient is currently rating their pain as 0/10. The patient denies other complaints, modifying factors or associated symptoms. The patient arrived to the ED via private car. Nursing notes reviewed. Past Medical History:   Diagnosis Date    Hypertension      Past Surgical History:   Procedure Laterality Date    CHOLECYSTECTOMY      HYSTERECTOMY       History reviewed. No pertinent family history.   Social History     Socioeconomic History    Marital status: Single Spouse name: Not on file    Number of children: Not on file    Years of education: Not on file    Highest education level: Not on file   Occupational History    Not on file   Social Needs    Financial resource strain: Not on file    Food insecurity     Worry: Not on file     Inability: Not on file    Transportation needs     Medical: Not on file     Non-medical: Not on file   Tobacco Use    Smoking status: Current Every Day Smoker     Packs/day: 0.50     Years: 40.00     Pack years: 20.00     Types: Cigarettes     Start date: 36    Smokeless tobacco: Never Used   Substance and Sexual Activity    Alcohol use: No    Drug use: No    Sexual activity: Not on file   Lifestyle    Physical activity     Days per week: Not on file     Minutes per session: Not on file    Stress: Not on file   Relationships    Social connections     Talks on phone: Not on file     Gets together: Not on file     Attends [employer] service: Not on file     Active member of club or organization: Not on file     Attends meetings of clubs or organizations: Not on file     Relationship status: Not on file    Intimate partner violence     Fear of current or ex partner: Not on file     Emotionally abused: Not on file     Physically abused: Not on file     Forced sexual activity: Not on file   Other Topics Concern    Not on file   Social History Narrative    Not on file     No current facility-administered medications for this encounter.       Current Outpatient Medications   Medication Sig Dispense Refill    Acetaminophen-Aspirin Buffered (EXCEDRIN BACK & BODY) 250-250 MG TABS Take by mouth as needed      EPINEPHrine (EPIPEN 2-LEONA) 0.3 MG/0.3ML SOAJ injection Inject 0.3 mLs into the muscle once for 1 dose Use as directed for allergic reaction (Patient not taking: Reported on 3/1/2021) 0.3 mL 1    omeprazole (PRILOSEC) 20 MG capsule Take 20 mg by mouth daily  EPINEPHrine (EPIPEN) 0.3 MG/0.3ML THANG injection Use as directed for allergic reaction (Patient not taking: Reported on 3/1/2021) 2 Device 3     Allergies   Allergen Reactions    Bee Pollen Swelling    Varenicline Nausea And Vomiting       REVIEW OF SYSTEMS  10 systems reviewed, pertinent positives per HPI otherwise noted to be negative    PHYSICAL EXAM  BP (!) 234/92   Pulse 78   Temp 98.7 °F (37.1 °C) (Oral)   Resp 18   Ht 5' 4\" (1.626 m)   Wt 195 lb (88.5 kg)   SpO2 96%   BMI 33.47 kg/m²   GENERAL APPEARANCE: Well developed, well nourished. Awake and alert. Cooperative. Observed resting in bed. No acute distress. HEAD: Normocephalic. Atraumatic. No facial asymmetry upon exam. Smile is symmetrical, tongue is midline. Uvula is midline and elevates appropriately. Posterior oropharynx is without erythema, edema, or exudate. EYES: Sclera is non-icteric. Conjunctiva normal. EOMI, PERRL.   ENT: External ears are normal. Mucous membranes are moist.   NECK: Supple. Normal ROM. Trachea mid-line. Cardiac: Regular rate and rhythm. Capillary refill is brisk in bilateral upper extremities. S1/S2 is normal. There are no murmurs, rubs, or gallops to auscultation. LUNGS: Breathing is unlabored. Speaking comfortably in full sentences. Equal and symmetric chest rise. Breath sounds are clear throughout. There is no wheezing, rales, or rhonchi to auscultation. Abdomen: Non-distended. Non-tender to palpation. Bowel sounds are positive in all 4 quadrants. There is no hepatosplenomegaly to palpation. Musculoskeletal: Normal ROM. No gross deformities or trauma noted. Moving all extremities equally and appropriately. NEUROLOGICAL: Alert and oriented x3. Strength is normal. No focal motor or sensory deficits. Strength is 5/5, equal and symmetric. SKIN: Warm and dry. Skin is with good color. Skin is intact. Psychiatric: Mood and affect appropriate. Speech is clear and articulate.     PROCEDURES  None    RADIOLOGY Ct Head Wo Contrast    Result Date: 3/1/2021  EXAMINATION: CT OF THE HEAD WITHOUT CONTRAST  3/1/2021 12:59 pm TECHNIQUE: CT of the head was performed without the administration of intravenous contrast. Dose modulation, iterative reconstruction, and/or weight based adjustment of the mA/kV was utilized to reduce the radiation dose to as low as reasonably achievable. COMPARISON: None. HISTORY: ORDERING SYSTEM PROVIDED HISTORY: syncopal episode on Saturday, HTN, HA off and on TECHNOLOGIST PROVIDED HISTORY: If patient is on cardiac monitor and/or pulse ox, they may be taken off cardiac monitor and pulse ox, left on O2 if currently on. All monitors reattached when patient returns to room. Has a \"code stroke\" or \"stroke alert\" been called? ->No Reason for exam:->syncopal episode on Saturday, HTN, HA off and on Decision Support Exception->Emergency Medical Condition (MA) Reason for Exam: syncope Sat, htn, tunnel vision on Fri Acuity: Acute Type of Exam: Initial FINDINGS: BRAIN/VENTRICLES: There is no acute intracranial hemorrhage, mass effect or midline shift. No abnormal extra-axial fluid collection. The gray-white differentiation is maintained without evidence of an acute infarct. There is no evidence of hydrocephalus. ORBITS: The visualized portion of the orbits demonstrate no acute abnormality. SINUSES: The visualized paranasal sinuses and mastoid air cells demonstrate no acute abnormality. SOFT TISSUES/SKULL:  No acute abnormality of the visualized skull or soft tissues. No acute intracranial abnormality. Xr Chest Portable    Result Date: 3/1/2021  EXAMINATION: ONE XRAY VIEW OF THE CHEST 3/1/2021 12:01 pm COMPARISON: None. HISTORY: ORDERING SYSTEM PROVIDED HISTORY: HTN TECHNOLOGIST PROVIDED HISTORY: Reason for exam:->HTN Reason for Exam: High BP Acuity: Acute Type of Exam: Initial FINDINGS: The lungs are clear. The mediastinum and cardiac silhouette are unremarkable. No acute abnormality. Cta Head Neck W Contrast    Result Date: 3/1/2021 EXAMINATION: CTA OF THE HEAD AND NECK WITH CONTRAST 3/1/2021 12:59 pm TECHNIQUE: CTA of the head and neck was performed with the administration of intravenous contrast. Multiplanar reformatted images are provided for review. MIP images are provided for review. Stenosis of the internal carotid arteries measured using NASCET criteria. Dose modulation, iterative reconstruction, and/or weight based adjustment of the mA/kV was utilized to reduce the radiation dose to as low as reasonably achievable. COMPARISON: None HISTORY: ORDERING SYSTEM PROVIDED HISTORY: syncopal episode on Saturday, HTN, HA off and on TECHNOLOGIST PROVIDED HISTORY: Reason for exam:->syncopal episode on Saturday, HTN, HA off and on Decision Support Exception->Emergency Medical Condition (MA) Reason for Exam: syncope Sat, htn, tunnel vision on Fri Acuity: Acute Type of Exam: Initial FINDINGS: CTA NECK: AORTIC ARCH/ARCH VESSELS: No dissection or arterial injury. No significant stenosis of the brachiocephalic or subclavian arteries. CAROTID ARTERIES: No dissection, arterial injury, or hemodynamically significant stenosis by NASCET criteria. VERTEBRAL ARTERIES: No dissection, arterial injury, or significant stenosis. SOFT TISSUES: Mild scarring is identified within both upper lobes. 1.6 cm nodule is identified within the right lobe of the thyroid gland. BONES: No acute osseous abnormality. CTA HEAD: ANTERIOR CIRCULATION: No significant stenosis of the intracranial internal carotid, anterior cerebral, or middle cerebral arteries. No aneurysm. POSTERIOR CIRCULATION: No significant stenosis of the vertebral, basilar, or posterior cerebral arteries. No aneurysm. OTHER: No dural venous sinus thrombosis on this non-dedicated study. BRAIN: No mass effect or midline shift. No extra-axial fluid collection. The gray-white differentiation is maintained. Chronic microvascular disease is identified within the periventricular white matter.   Old left caudate nucleus lacune is identified. Unremarkable CTA of the head and neck. 1.6 cm nodule within the right lobe of the thyroid gland. Thyroid ultrasound is suggested.          LABS  Results for orders placed or performed during the hospital encounter of 03/01/21   CBC auto differential   Result Value Ref Range    WBC 10.6 4.0 - 11.0 K/uL    RBC 5.03 4.00 - 5.20 M/uL    Hemoglobin 14.7 12.0 - 16.0 g/dL    Hematocrit 42.7 36.0 - 48.0 %    MCV 85.0 80.0 - 100.0 fL    MCH 29.3 26.0 - 34.0 pg    MCHC 34.5 31.0 - 36.0 g/dL    RDW 13.8 12.4 - 15.4 %    Platelets 600 453 - 623 K/uL    MPV 8.7 5.0 - 10.5 fL    Neutrophils % 64.5 %    Lymphocytes % 28.2 %    Monocytes % 5.6 %    Eosinophils % 0.6 %    Basophils % 1.1 %    Neutrophils Absolute 6.8 1.7 - 7.7 K/uL    Lymphocytes Absolute 3.0 1.0 - 5.1 K/uL    Monocytes Absolute 0.6 0.0 - 1.3 K/uL    Eosinophils Absolute 0.1 0.0 - 0.6 K/uL    Basophils Absolute 0.1 0.0 - 0.2 K/uL   Comprehensive metabolic panel   Result Value Ref Range    Sodium 139 136 - 145 mmol/L    Potassium 3.0 (L) 3.5 - 5.1 mmol/L    Chloride 94 (L) 99 - 110 mmol/L    CO2 34 (H) 21 - 32 mmol/L    Anion Gap 11 3 - 16    Glucose 121 (H) 70 - 99 mg/dL    BUN 7 7 - 20 mg/dL    CREATININE 0.9 0.6 - 1.2 mg/dL    GFR Non-African American >60 >60    GFR African American >60 >60    Calcium 9.4 8.3 - 10.6 mg/dL    Total Protein 8.1 6.4 - 8.2 g/dL    Albumin 4.5 3.4 - 5.0 g/dL    Albumin/Globulin Ratio 1.3 1.1 - 2.2    Total Bilirubin 0.9 0.0 - 1.0 mg/dL    Alkaline Phosphatase 110 40 - 129 U/L    ALT <5 (L) 10 - 40 U/L    AST 14 (L) 15 - 37 U/L    Globulin 3.6 g/dL   Troponin   Result Value Ref Range    Troponin <0.01 <0.01 ng/mL   EKG 12 Lead   Result Value Ref Range    Ventricular Rate 73 BPM    Atrial Rate 73 BPM    P-R Interval 172 ms    QRS Duration 96 ms    Q-T Interval 410 ms    QTc Calculation (Bazett) 451 ms    P Axis 60 degrees    R Axis 9 degrees    T Axis 76 degrees    Diagnosis Normal sinus rhythmNonspecific ST abnormalityConfirmed by Leonardo Duane MD, [facility] ([id number]) on 3/1/2021 1:14:22 PM       ED COURSE/MDM  Patient seen and evaluated. Old records reviewed. Diagnostic testing reviewed and results discussed. I have seen and evaluated this patient with supervising physician: Raylene Ormond, MD. We thoroughly discussed the history, physical exam, diagnostic testing, emergency department course, plan and disposition. Pema Coleman presented to the ED today with above noted complaints. Physical exam is unremarkable and without focal or lateralizing deficits on exam.  Cranial nerves II through XII are grossly intact. I will give the patient 20 mg of lisinopril as this is what she has historically been on. Blood work is without evidence of systemic infection. No anemia. There is a hypokalemia his potassium is low at 3. No further significant electrolyte abnormality. No evidence of acute kidney injury or transaminitis. Troponin is negative. Patient will be given 40 mEq of potassium orally. Chest x-ray is without acute findings. EKG also without acute findings. ED attending physician did inform me that the patient is reporting numbness to the right side of her tongue that has been going on for a year. 1 year ago when this started she also had numbness to the right side of the face that lasted several hours but resolved, tongue numbness has persisted. Apparently when she discussed this with her primary care provider they were unconcerned. Patient has also not been taking her antihypertensive medicines as directed due to issues with primary care provider. I did order CT of the head without contrast and this is without acute findings. CTA of the head and neck is unremarkable. There is a 1.6 cm nodule in the right lobe of the thyroid gland. Thyroid ultrasound is suggested.     While in the ED the patient received   Medications potassium chloride (KLOR-CON M) extended release tablet 40 mEq (40 mEq Oral Given 3/1/21 1244)   lisinopril (PRINIVIL;ZESTRIL) tablet 20 mg (20 mg Oral Given 3/1/21 1244)   iopamidol (ISOVUE-370) 76 % injection 75 mL (75 mLs Intravenous Given 3/1/21 1311)     Patient also found to have a low magnesium level of 1.3. I did ordered 2 gm of mag IV. Patient was also given 10 mg of IV hydralazine and blood pressure did decrease from 265/103 on admission to 204/98. I do have concern for the patient regards to follow-up as she is not historically done well with this. Given this I do feel she requires admission for further evaluation and management of her multiple medical conditions. I spoke with Dr. Adelita Christian. We thoroughly discussed the history, physical exam, laboratory and imaging studies, as well as, emergency department course. Based upon that discussion, we've decided to admit Ajit Hilario for further observation and evaluation of Lona Gaunt chest pain. As I have deemed necessary from their history, physical, and studies, I have considered and evaluated Ajit Hilario for the following diagnoses:  ACUTE CORONARY SYNDROME, PERICARDIAL TAMPONADE, PNEUMOTHORAX, PULMONARY EMBOLISM, and THORACIC DISSECTION. The total critical care time spent while evaluating and treating this patient was 45 minutes. This excludes time spent doing separately billable procedures. This includes time at the bedside, data interpretation, medication management, obtaining critical history from collateral sources if the patient is unable to provide it directly, and physician consultation. Specifics of interventions taken and potentially life-threatening diagnostic considerations are listed above in the medical decision making. Clinical Impression  Final diagnoses:   Hypertensive urgency   Syncope and collapse   Paresthesia   Thyroid nodule   Hypokalemia   Hypomagnesemia     DISPOSITION  Admit. Comment: Please note this report has been produced using speech recognition software and may contain errors related to that system including errors in grammar, punctuation, and spelling, as well as words and phrases that may be inappropriate. If there are any questions or concerns please feel free to contact the dictating provider for clarification.        RONNELL Han - CNP  03/01/21 7846

## 2021-03-01 NOTE — PROGRESS NOTES
Patient: Michael Miller is a 64 y.o. female who presents today with the following Chief Complaint(s):  Chief Complaint   Patient presents with    Established New Doctor     Hypertension         HPI-this is a 77-year-old female patient establishing care with me today  It is determined that she has uncontrolled hypertension we retook her blood pressure it was 266 over 144. She has not been on any of her medications for blood pressure for over a year. Saturday she had an event where she fainted her family called the squad but then she got better and the squad did not take her to the hospital at this time. She complained of tunnel vision loss of bowel and bladder at this time. One of the witness said that her eyes rolled back during the fainting spell. She was never evaluated for this and today I am sending her to ER for evaluation  Current Outpatient Medications   Medication Sig Dispense Refill    Acetaminophen-Aspirin Buffered (196-[facility]) 250-250 MG TABS Take by mouth as needed      omeprazole (PRILOSEC) 20 MG capsule Take 20 mg by mouth daily      lisinopril (PRINIVIL;ZESTRIL) 20 MG tablet Take 1 tablet by mouth daily (Patient not taking: Reported on 3/1/2021) 30 tablet 2    hydroCHLOROthiazide (HYDRODIURIL) 25 MG tablet Take 1 tablet by mouth every morning (Patient not taking: Reported on 3/1/2021) 30 tablet 3    EPINEPHrine (EPIPEN 2-LEONA) 0.3 MG/0.3ML SOAJ injection Inject 0.3 mLs into the muscle once for 1 dose Use as directed for allergic reaction (Patient not taking: Reported on 3/1/2021) 0.3 mL 1    EPINEPHrine (EPIPEN) 0.3 MG/0.3ML THANG injection Use as directed for allergic reaction (Patient not taking: Reported on 3/1/2021) 2 Device 3     No current facility-administered medications for this visit. Patient's past medical history, surgical history, family history, medications,  and allergies  were all reviewed and updated as appropriate today.       Review of Systems Respiratory: Negative for shortness of breath. Cardiovascular: Negative for chest pain. Neurological: Positive for headaches. Negative for seizures and speech difficulty. All other systems reviewed and are negative. Physical Exam  Vitals signs and nursing note reviewed. HENT:      Head: Normocephalic. Nose: Nose normal.      Mouth/Throat:      Mouth: Mucous membranes are moist.   Eyes:      Conjunctiva/sclera: Conjunctivae normal.   Neck:      Musculoskeletal: Normal range of motion. Cardiovascular:      Rate and Rhythm: Regular rhythm. Pulses: Normal pulses. Pulmonary:      Effort: Pulmonary effort is normal.   Musculoskeletal: Normal range of motion. Skin:     General: Skin is warm and dry. Neurological:      Mental Status: She is alert and oriented to person, place, and time. Psychiatric:         Mood and Affect: Mood normal.         Behavior: Behavior normal.         Thought Content: Thought content normal.         Judgment: Judgment normal.       Vitals:    03/01/21 1124   BP: (!) 266/144   Pulse:    Temp:    SpO2:        Assessment:  Encounter Diagnoses   Name Primary?  Hypertensive urgency Yes    Uncontrolled hypertension     Syncope, unspecified syncope type     Generalized headache        Controlled SubstancesMonitoring:  NA    Plan:  1. Hypertensive urgency  Problem  Go to the [facility] ER now. She is with a friend that can drive her. She does not want to go per squad today  Needs a cardiology referral and evaluation at the [facility] [address]    2. Uncontrolled hypertension  Problem  Go to ER for evaluation of hypertension urgency. - [address]    3. Syncope, unspecified syncope type  Problem  - [address]    4.  Generalized headache  Problem  - [address]    Follow-up after stabilized    KASHIF Garcia Reviewed treatment plan with patient. Patient verbalized understanding to treatment plan and questions were answered. [address] [facility] [address]  [place], [address]

## 2021-03-01 NOTE — ED NOTES
1401 - PS to hospitalists  Re: HTN urgency, syncope, paresthesia  1402 - Dr Bryan Saenz acknowledged PS   1434 - Dr Bryan Saenz called back to speak with NP Rylee Augustin  03/01/21 1530

## 2021-03-01 NOTE — ED PROVIDER NOTES
ED Attending Attestation Note    This patient was seen by the advance practice provider. I have seen and examined the patient. I agree with the workup, evaluation, management, and diagnosis. The care plan has been discussed. My assessment reveals 64 y.o. female who presents with concern for hypertensive urgency. Patient reports longstanding history of hypertension, not on antihypertensives. Patient reports presyncopal episode on Friday. On Saturday, episode of LOC at which time she passed out suddenly and lost control of her bladder. Relatives saw her eyes roll back in head, arms were \"shaking\". Pt denies tongue biting. EMS was summoned but patient refused transfer. Saw PCP today and was advised to present to ED for further evaluation/treatment. Denies CP, SOB, nausea, vomiting, diarrhea, abdominal pain. Pt complains of numbness of the right side of the tongue that has been going on for a year. At the time these symptoms started (one year ago) patient also had ipsilateral numbness of the face that lasted several hours and resolved. The tongue numbness has persisted. EKG interpreted by myself. Rate: normal  Rhythm: NSR  Axis: normal  Intervals: within normal limits  ST Segments: nonspecific ST abnormality  Comparison: no prior  Impression: NSR with possible LAE, nonspecific ST abnormality    For further details of the patient's emergency department visit, please see the advanced practice provider's documentation. Malu Nieves MD     This report has been produced using speech recognition software and may contain errors related to that system including errors in grammar, punctuation, and spelling, as well as words and phrases that may be inappropriate. If there are any questions or concerns please feel free to contact the dictating provider for clarification.        Malu Nieves MD  03/01/21 2414

## 2021-03-01 NOTE — LETTER
[facility] [place] [address]  Phone: [phone]             March 4, 2021    Patient: Dav Clarke   YOB: 1960   Date of Visit: 3/1/2021       To Whom It May Concern:    Tamanna Gann was seen and treated in our facility  beginning 3/1/2021 until 3/4/2021. She may return to work on 3/8/21.       Sincerely,       Kathi Figueroa RN         Signature:__________________________________

## 2021-03-01 NOTE — Clinical Note
Patient Class: Inpatient [101]   REQUIRED: Diagnosis: Hypertensive urgency, malignant [[id number]]   Estimated Length of Stay: Estimated stay of more than 2 midnights   Admitting Provider: Johan Dixon [[id number]]   Telemetry Bed Required?: Yes

## 2021-03-01 NOTE — TELEPHONE ENCOUNTER
Reason for Disposition   Lightheadedness (dizziness) present now, after 2 hours of rest and fluids    Answer Assessment - Initial Assessment Questions  1. DESCRIPTION: \"Describe your dizziness. \"      Started on Friday   2. LIGHTHEADED: \"Do you feel lightheaded? \" (e.g., somewhat faint, woozy, weak upon standing)      Faint at times passed out Saturday    3. VERTIGO: \"Do you feel like either you or the room is spinning or tilting? \" (i.e. vertigo)      On Friday not now    4. SEVERITY: \"How bad is it? \"  \"Do you feel like you are going to faint? \" \"Can you stand and walk? \"    - MILD - walking normally    - MODERATE - interferes with normal activities (e.g., work, school)     - SEVERE - unable to stand, requires support to walk, feels like passing out now. Fine right now    5. ONSET:  \"When did the dizziness begin? \"      Friday    6. AGGRAVATING FACTORS: \"Does anything make it worse? \" (e.g., standing, change in head position)      Sudden onset and went away    7. HEART RATE: \"Can you tell me your heart rate? \" \"How many beats in 15 seconds? \"  (Note: not all patients can do this)        Denies    8. CAUSE: \"What do you think is causing the dizziness? \"      ? htn    9. RECURRENT SYMPTOM: \"Have you had dizziness before? \" If so, ask: \"When was the last time? \" \"What happened that time? \"      Denies    10. OTHER SYMPTOMS: \"Do you have any other symptoms? \" (e.g., fever, chest pain, vomiting, diarrhea, bleeding)        Not now    11. PREGNANCY: \"Is there any chance you are pregnant? \" \"When was your last menstrual period? \"    Protocols used: [id number]-[id number]    Patient called Neal Loja at [facility])  with red flag complaint. Brief description of triage: as above dizziness on/off started on Friday none now but checked bp at home with auto cuff 233/119 states on Saturday passed out [employer] called and and vs taked but did not go to er states had loc and lost control of bowels.  No h/a or dizziness at this time    Triage indicates for patient to er    Care advice provided, patient verbalizes understanding; denies any other questions or concerns; instructed to call back for any new or worsening symptoms. Writer provided warm transfer to [facility] for appointment scheduling. Attention Provider: Thank you for allowing me to participate in the care of your patient. The patient was connected to triage in response to information provided to the ECC. Please do not respond through this encounter as the response is not directed to a shared pool.

## 2021-03-02 PROBLEM — I63.9 CVA (CEREBRAL VASCULAR ACCIDENT) (HCC): Status: ACTIVE | Noted: 2021-01-01

## 2021-03-02 NOTE — H&P
History obtained from patient and review of Epic chart      REVIEW OF SYSTEMS:   Constitutional: Negative for fever,chills,weight loss; positive generalized weakness  ENT: Negative for rhinorrhea, epistaxis, hoarseness, and sore throat. Respiratory: Negative for shortness of breath, wheezing, and cough; 1/2 pack/day tobacco smoker  Cardiovascular: Negative for chest pain, palpitations, and peripheral edema; no orthopnea or PND  Gastrointestinal: Negative for N/V/D; no hematemesis, hematochezia, or melena; no anorexia  Genitourinary: Negative for dysuria, frequency, hesitancy, and urgency; no incontinence  Hematologic/Lymphatic: Negative for bleeding tendency, excessive bruising, and enlarged LN  Musculoskeletal: Negative for myalgias and arthalgias; able to ambulate without difficulty  Neurologic: Positive for LOC and paresthesias 2 days PTA; no seizure activity, dysarthria, vertigo, and gait disturbance  Skin: Negative for itching,rash  Psychiatric: Positive for depression,anxiety, without agitation; denies SI/HI  Endocrine: Negative for polyuria/polydipsia/polyphagia; no heat/cold intolerance    Past Medical History:   has a past medical history of Hypertension. Past Surgical History:   has a past surgical history that includes Hysterectomy and Cholecystectomy. Medications:  No current facility-administered medications on file prior to encounter.       Current Outpatient Medications on File Prior to Encounter   Medication Sig Dispense Refill    Acetaminophen-Aspirin Buffered (196-198 [facility]) 250-250 MG TABS Take by mouth as needed      EPINEPHrine (EPIPEN 2-LEONA) 0.3 MG/0.3ML SOAJ injection Inject 0.3 mLs into the muscle once for 1 dose Use as directed for allergic reaction (Patient not taking: Reported on 3/1/2021) 0.3 mL 1    omeprazole (PRILOSEC) 20 MG capsule Take 20 mg by mouth daily  EPINEPHrine (EPIPEN) 0.3 MG/0.3ML THANG injection Use as directed for allergic reaction (Patient not taking: Reported on 3/1/2021) 2 Device 3       Allergies: Allergies   Allergen Reactions    Bee Pollen Swelling    Varenicline Nausea And Vomiting        Social History:   reports that she has been smoking cigarettes. She started smoking about 41 years ago. She has a 20.00 pack-year smoking history. She has never used smokeless tobacco. She reports that she does not drink alcohol or use drugs. Family History:  family history is not on file.      Physical Exam:  BP (!) 146/66   Pulse 85   Temp 98.7 °F (37.1 °C) (Oral)   Resp 14   Ht 5' 4\" (1.626 m)   Wt 195 lb (88.5 kg)   SpO2 97%   BMI 33.47 kg/m²     General appearance: WDWN female who appears older than her stated age  Eyes: Sclera clear without conjunctival injection; PERRLA; EOMI  ENT: Mucous membranes moist without thrush; normal dentition  Neck: Supple without meningismus; no goiter; faint carotid bruit bilaterally  Cardiovascular: Regular rhythm without ectopy; normal S1-S2 with 2/6 murmurs; no peripheral edema; no JVD  Respiratory: No tachypnea; CTAB with diminished air exchange; no wheeze, rhonchi or rales; I:E prolonged  Gastrointestinal: Abdomen soft, non-tender, not distended; bowel sounds normal x4 quadrants; no masses/organomegaly appreciated  Musculoskeletal: FROM spine and extremities x4; no gross deformity  Neurology: A&O x3; cranial nerves 2-12 grossly intact; motor 5/5  BUE/BLE; finger-to-nose/heel-to-shin intact; no pronator drift; no seizure activity  Psychiatry: Well-groomed with good eye contact; appropriate affect; no visual/auditory hallucination  Skin: Warm, dry, normal turgor, no rash  PV: 2/4 radial and dorsalis pedis bilaterally; brisk capillary refill    Labs:  CBC:   Lab Results   Component Value Date    WBC 10.6 03/01/2021    RBC 5.03 03/01/2021    HGB 14.7 03/01/2021    HCT 42.7 03/01/2021    MCV 85.0 03/01/2021 MCH 29.3 03/01/2021    MCHC 34.5 03/01/2021    RDW 13.8 03/01/2021     03/01/2021    MPV 8.7 03/01/2021     BMP:    Lab Results   Component Value Date     03/01/2021    K 2.9 03/01/2021    CL 95 03/01/2021    CO2 30 03/01/2021    BUN 8 03/01/2021    CREATININE 0.9 03/01/2021    CALCIUM 8.8 03/01/2021    GFRAA >60 03/01/2021    LABGLOM >60 03/01/2021    GLUCOSE 155 03/01/2021     CT HEAD WO CONTRAST   Final Result   No acute intracranial abnormality. CTA HEAD NECK W CONTRAST   Final Result   Unremarkable CTA of the head and neck. 1.6 cm nodule within the right lobe of the thyroid gland. Thyroid ultrasound   is suggested. XR CHEST PORTABLE   Final Result   No acute abnormality. MRI BRAIN WO CONTRAST    (Results Pending)         EKG:    Ventricular Rate 73 BPM QTc Calculation (Bazett) 451 ms   Atrial Rate 73 BPM P Axis 60 degrees   P-R Interval 172 ms R Axis 9 degrees   QRS Duration 96 ms T Axis 76 degrees   Q-T Interval 410 ms Diagnosis Normal sinus rhythmNonspecific ST abnormality         I visualized CXR images and EKG strips personally and agree with documented interpretation    Discussed case  with ED provider    Problem List  Active Problems:    Hypertensive urgency, malignant    Syncope    Hypomagnesemia    Hypokalemia    Thyroid nodule    Tobacco abuse    Malignant hypertensive urgency  Resolved Problems:    * No resolved hospital problems. *        Assessment/Plan:     Malignant hypertensive urgency  Patient admitted to ICU for initiation of labetalol drip with titration parameters placed  Goal of 25% BP reduction initially to avoid complications of overly aggressive correction  Thyroid studies, UDS and ethanol level pending  Echo scheduled in a.m. to further assess cardiac structure and function  IV Lasix 10 mg x 1 dose overnight with strict I's and O's  Initiate lisinopril 20 mg daily in a.m.   IV labetalol every 4 hours as needed with BP parameters placed Patient educated in detail regarding morbidity mortality associated with noncompliance with antihypertensive regimen  Consultation placed to cardiology for further recommendations    Syncope with focal neurologic deficit PTA  Admit to telemetry under CVA pathway with every 4 hours neuro checks overnight; symptoms likely due to hypertensive encephalopathy  Initiate EC ASA 81 mg daily antiplatelet agent as well as high-dose statin therapy; FLP pending  Aspiration precautions and fall protocol in place  Echo scheduled for a.m. MRI of the head with and without contrast scheduled for a.m.  As needed labetalol scheduled for extreme BP elevation; will allow permissive hypertension initially to ensure watershed blood flow remains intact  PT/OT/SLP scheduled for a.m. Hypokalemia/hypomagnesemia  Continuous telemetry monitoring during stay  Parenteral KCl and MGSO4 initiated overnight due to severity of deficiency  Strict I's and O's  Consultation placed to nephrology for further evaluation and recommendation    Tobacco abuse  Continuous pulse oximetry overnight  Combivent MDI as needed  Counseled aggressively regarding necessity of tobacco cessation; nicotine replacement patch provided      DVT prophylaxisSCD overnight; Lovenox to be initiated on day 2 following BP improvement to avoid [address]  Code statusfull code  Dietcardiac PROSPER  IV accessPIV established in ED      Admit as inpatient. I anticipate hospitalization spanning more than two midnights for investigation and treatment of the above medically necessary diagnoses. Please note that some part of this chart was generated using Dragon dictation software. Although every effort was made to ensure the accuracy of this automated transcription, some errors in transcription may have occurred inadvertently. If you may need any clarification, please do not hesitate to contact me through [facility].        Lacey Linda MD    3/2/2021 2:42 AM

## 2021-03-02 NOTE — PROGRESS NOTES
Handoff report given to [facility] RN to assume care of pt. Pt is on no infusions or O2.

## 2021-03-02 NOTE — CONSULTS
[address] [place] [address]  [phone]      Attending Physician: David Castillo MD  Reason for Consultation/Chief Complaint: Dizziness, loss of consciousness    Subjective   History of Present Illness:  Yovanny Chester is a 64 y.o. patient who presented to the hospital with complaints of dizziness, patient had an episode of loss of consciousness over the weekend and has been noticing elevated blood pressure. She presented to emergency room yesterday and was found to have hypertensive urgency, troponin levels were found to be negative. She was admitted to the hospital.  She says she has had hypertension going back 20 years and it has not been well controlled. She is not currently on any medications. She denies chest pain or shortness of breath or palpitations. She believes she has had a remote cardiology evaluation and was \"a candidate for a pacemaker\" at one time but when she followed up with a cardiologist, she had testing done including a stress test and she was felt to be stable and did not require further intervention. Work-up in the hospital revealed elevated proBNP level of 484. Potassium level has been low between 2.9 and 3.2. Magnesium has been low at 1.3-1.8. She had a CT scan of the head which was unremarkable, she also had a CTA of the head and neck which did not show significant stenosis, there was a little old left caudate nucleus lacunar infarct and an MRI is pending. Past Medical History:   has a past medical history of Hypertension. Surgical History:   has a past surgical history that includes Hysterectomy and Cholecystectomy. Social History:   reports that she has been smoking cigarettes. She started smoking about 41 years ago. She has a 20.00 pack-year smoking history. She has never used smokeless tobacco. She reports that she does not drink alcohol or use drugs.      Family History:  She says her father's family has CAD      Home Medications:   potassium bicarb-citric acid (EFFER-K) effervescent tablet 40 mEq, PRN    Or      potassium chloride 10 mEq/100 mL IVPB (Peripheral Line), PRN      atorvastatin (LIPITOR) tablet 40 mg, Nightly        Allergies:  Bee pollen and Varenicline     Review of Systems:   A 14 point review of symptoms completed. Pertinent positives identified in the HPI, all other review of symptoms negative as below.       Objective   PHYSICAL EXAM:    Vitals:    03/02/21 0800   BP: (!) 181/75   Pulse: 68   Resp: 15   Temp: 98.8 °F (37.1 °C)   SpO2:     Weight: 195 lb (88.5 kg)         General Appearance:  Alert, cooperative, no distress, appears stated age   Head:  Normocephalic, without obvious abnormality, atraumatic   Eyes:  PERRL, conjunctiva/corneas clear   Nose: Nares normal, no drainage or sinus tenderness   Throat: Lips, mucosa, and tongue normal   Neck: Supple, symmetrical, trachea midline, no adenopathy, thyroid: not enlarged, symmetric, no tenderness/mass/nodules, no carotid bruit or JVD   Lungs:   Clear to auscultation bilaterally, respirations unlabored   Chest Wall:  No deformity or tenderness   Heart:  Regular rate and rhythm, S1, S2 normal, 1/6 sm   Abdomen:   Soft, non-tender, bowel sounds active all four quadrants,  no masses, no organomegaly   Extremities: Extremities normal, atraumatic, no cyanosis or edema   Pulses: 2+ and symmetric   Skin: Skin color, texture, turgor normal, no rashes or lesions   Pysch: Normal mood and affect   Neurologic: Normal gross motor and sensory exam.         Labs   CBC:   Lab Results   Component Value Date    WBC 10.2 03/02/2021    RBC 4.67 03/02/2021    HGB 13.2 03/02/2021    HCT 39.4 03/02/2021    MCV 84.2 03/02/2021    RDW 13.7 03/02/2021     03/02/2021     CMP:  Lab Results   Component Value Date     03/02/2021    K 3.2 03/02/2021    CL 99 03/02/2021    CO2 31 03/02/2021    BUN 8 03/02/2021    CREATININE 0.7 03/02/2021    GFRAA >60 03/02/2021    AGRATIO 1.3 03/02/2021 LABGLOM >60 03/02/2021    GLUCOSE 110 03/02/2021    PROT 6.7 03/02/2021    CALCIUM 8.8 03/02/2021    BILITOT 0.9 03/02/2021    ALKPHOS 87 03/02/2021    AST 9 03/02/2021    ALT <5 03/02/2021     PT/INR:  No results found for: PTINR  HgBA1c:  Lab Results   Component Value Date    LABA1C 5.9 08/05/2020     Lab Results   Component Value Date    TROPONINI <0.01 03/01/2021         Cardiac Data     Last EKG: Normal sinus rhythm, possible LVH, nonspecific ST/T changes    Echo:    Stress Test:    Cath:    Studies:     cxr       Impression   No acute abnormality.             I have reviewed labs and imaging/xray/diagnostic testing in this note. Assessment and Plan        Patient Active Problem List   Diagnosis    Hypertensive urgency, malignant    Hypomagnesemia    Syncope    Tobacco abuse    Hypokalemia    Thyroid nodule    Malignant hypertensive urgency       Hypertensive urgency, agree with treatment with lisinopril, will add carvedilol. Syncope, evaluate further with echocardiogram, CT calcium score, Lexiscan nuclear stress test    Hypercholesterolemia, continue statin        Thank you for allowing us to participate in the care of Jordana Atkins. Please call me with any questions [phone].     Connell Aschoff, MD, [facility]   Interventional Cardiologist  [address]  [phone] [facility]  [phone] [address] [place] [address]  3/2/2021 8:33 AM

## 2021-03-02 NOTE — PROGRESS NOTES
RESPIRATORY THERAPY ASSESSMENT    Name:  Patricia Allison Record Number:  [id number]  Age: 64 y.o. Gender: female  : 1960  Today's Date:  3/2/2021  Room:  023/0231-01    Assessment     Is the patient being admitted for a COPD or Asthma exacerbation? No   (If yes the patient will be seen every 4 hours for the first 24 hours and then reassessed)    Patient Admission Diagnosis      Allergies  Allergies   Allergen Reactions    Bee Pollen Swelling    Varenicline Nausea And Vomiting       Minimum Predicted Vital Capacity:    N/A          Actual Vital Capacity:      N/A              Pulmonary History:No history  Home Oxygen Therapy:  room air  Home Respiratory Therapy:None   Current Respiratory Therapy:  Duoneb Q4 prn          Respiratory Severity Index(RSI)   Patients with orders for inhalation medications, oxygen, or any therapeutic treatment modality will be placed on Respiratory Protocol. They will be assessed with the first treatment and at least every 72 hours thereafter. The following severity scale will be used to determine frequency of treatment intervention.     Smoking History: Smoking History Less than 1ppd or less than 15 pack year = 1    Social History  Social History     Tobacco Use    Smoking status: Current Every Day Smoker     Packs/day: 0.50     Years: 40.00     Pack years: 20.00     Types: Cigarettes     Start date:     Smokeless tobacco: Never Used   Substance Use Topics    Alcohol use: No    Drug use: No       Recent Surgical History: None = 0  Past Surgical History  Past Surgical History:   Procedure Laterality Date    CHOLECYSTECTOMY      HYSTERECTOMY         Level of Consciousness: Alert, Oriented, and Cooperative = 0    Level of Activity: Walking with assistance = 1    Respiratory Pattern: Regular Pattern; RR 8-20 = 0    Breath Sounds: Diminshed bilaterally and/or crackles = 2    Sputum   ,  ,    Cough: Strong, spontaneous, non-productive = 0    Vital Signs BP (!) 177/70   Pulse 71   Temp 98.8 °F (37.1 °C) (Oral)   Resp 13   Ht 5' 4\" (1.626 m)   Wt 195 lb (88.5 kg)   SpO2 96%   BMI 33.47 kg/m²   SPO2 (COPD values may differ): Greater than or equal to 92% on room air = 0    Peak Flow (asthma only): not applicable = 0    RSI: 0-4 = See once and convert to home regimen or discontinue        Plan       Goals: volume expansion    Patient/caregiver was educated on the proper method of use for Respiratory Care Devices:  No: N/A      Level of patient/caregiver understanding able to:   ? Verbalize understanding   ? Demonstrate understanding       ? Teach back        ? Needs reinforcement       ? No available caregiver               ? Other:     Response to education:  N/A     Is patient being placed on Home Treatment Regimen? No     Does the patient have everything they need prior to discharge? NA     Comments: Patient assessed    Plan of Care: Keep current therapy for shortness of breath. Electronically signed by Eusebio Robert RCP on 3/2/2021 at 5:09 AM    Respiratory Protocol Guidelines     1. Assessment and treatment by Respiratory Therapy will be initiated for medication and therapeutic interventions upon initiation of aerosolized medication. 2. Physician will be contacted for respiratory rate (RR) greater than 35 breaths per minute. Therapy will be held for heart rate (HR) greater than 140 beats per minute, pending direction from physician. 3. Bronchodilators will be administered via Metered Dose Inhaler (MDI) with spacer when the following criteria are met:  a. Alert and cooperative     b. HR < 140 bpm  c. RR < 30 bpm                d. Can demonstrate a 23 second inspiratory hold  4. Bronchodilators will be administered via Hand Held Nebulizer MANISHA [facility]) to patients when ANY of the following criteria are met  a. Incognizant or uncooperative          b. Patients treated with HHN at Home        c.  Unable to demonstrate proper use of MDI with spacer d. RR > 30 bpm   5. Bronchodilators will be delivered via Metered Dose Inhaler (MDI), HHN, Aerogen to intubated patients on mechanical ventilation. 6. Inhalation medication orders will be delivered and/or substituted as outlined below. Aerosolized Medications Ordering and Administration Guidelines:    1. All Medications will be ordered by a physician, and their frequency and/or modality will be adjusted as defined by the patients Respiratory Severity Index (RSI) score. 2. If the patient does not have documented COPD, consider discontinuing anticholinergics when RSI is less than 9.  3. If the bronchospasm worsens (increased RSI), then the bronchodilator frequency can be increased to a maximum of every 4 hours. If greater than every 4 hours is required, the physician will be contacted. 4. If the bronchospasm improves, the frequency of the bronchodilator can be decreased, based on the patient's RSI, but not less than home treatment regimen frequency. 5. Bronchodilator(s) will be discontinued if patient has a RSI less than 9 and has received no scheduled or as needed treatment for 72  Hrs. Patients Ordered on a Mucolytic Agent:    1. Must always be administered with a bronchodilator. 2. Discontinue if patient experiences worsened bronchospasm, or secretions have lessened to the point that the patient is able to clear them with a cough. Anti-inflammatory and Combination Medications:    1. If the patient lacks prior history of lung disease, is not using inhaled anti-inflammatory medication at home, and lacks wheezing by examination or by history for at least 24 hours, contact physician for possible discontinuation.

## 2021-03-02 NOTE — PROGRESS NOTES
Hospitalist Progress Note      PCP: No primary care provider on file. Date of Admission: 3/1/2021    Chief Complaint: Syncope/HTN    Subjective: no new c/o. Medications:  Reviewed    Infusion Medications    labetalol (NORMODYNE) 1 mg/mL infusion       Scheduled Medications    mupirocin   Nasal BID    carvedilol  6.25 mg Oral BID WC    aspirin  81 mg Oral Daily    pantoprazole  40 mg Oral QAM AC    sodium chloride flush  10 mL Intravenous 2 times per day    enoxaparin  40 mg Subcutaneous Daily    nicotine  1 patch Transdermal Daily    lisinopril  20 mg Oral Daily    atorvastatin  40 mg Oral Nightly     PRN Meds: ipratropium-albuterol, regadenoson, sodium chloride flush, promethazine **OR** ondansetron, acetaminophen **OR** acetaminophen, senna, labetalol, potassium chloride **OR** potassium alternative oral replacement **OR** potassium chloride      Intake/Output Summary (Last 24 hours) at 3/2/2021 1535  Last data filed at 3/2/2021 0400  Gross per 24 hour   Intake 280 ml   Output    Net 280 ml       Physical Exam Performed:    BP (!) 160/87   Pulse 72   Temp 98.8 °F (37.1 °C) (Oral)   Resp 14   Ht 5' 4\" (1.626 m)   Wt 195 lb (88.5 kg)   SpO2 97%   BMI 33.47 kg/m²     General appearance: No apparent distress, appears stated age and cooperative. HEENT: Pupils equal, round, and reactive to light. Conjunctivae/corneas clear. Neck: Supple, with full range of motion. No jugular venous distention. Trachea midline. Respiratory:  Normal respiratory effort. Clear to auscultation, bilaterally without Rales/Wheezes/Rhonchi. Cardiovascular: Regular rate and rhythm with normal S1/S2 without murmurs, rubs or gallops. Abdomen: Soft, non-tender, non-distended with normal bowel sounds. Musculoskeletal: No clubbing, cyanosis or edema bilaterally. Full range of motion without deformity. Skin: Skin color, texture, turgor normal.  No rashes or lesions. Neurologic:  Neurovascularly intact without any focal sensory/motor deficits. Cranial nerves: II-XII intact, grossly non-focal.  Psychiatric: Alert and oriented, thought content appropriate, normal insight  Capillary Refill: Brisk,< 3 seconds   Peripheral Pulses: +2 palpable, equal bilaterally       Labs:   Recent Labs     03/01/21 1157 03/02/21 0429   WBC 10.6 10.2   HGB 14.7 13.2   HCT 42.7 39.4    251     Recent Labs     03/01/21 1157 03/01/21 2306 03/02/21  0429    135* 139   K 3.0* 2.9* 3.2*   CL 94* 95* 99   CO2 34* 30 31   BUN 7 8 8   CREATININE 0.9 0.9 0.7   CALCIUM 9.4 8.8 8.8   PHOS  --  3.6  --      Recent Labs     03/01/21 1157 03/02/21 0429   AST 14* 9*   ALT <5* <5*   BILITOT 0.9 0.9   ALKPHOS 110 87     No results for input(s): INR in the last 72 hours. Recent Labs     03/01/21 1157 03/01/21 1916 03/01/21 2306   TROPONINI <0.01 <0.01 <0.01       Urinalysis:    No results found for: NITRU, WBCUA, BACTERIA, RBCUA, BLOODU, SPECGRAV, GLUCOSEU    Consults:    IP CONSULT TO HOSPITALIST  IP CONSULT TO CARDIOLOGY  IP CONSULT TO NEPHROLOGY  IP CONSULT TO NEUROLOGY      Assessment/Plan:    Active Hospital Problems    Diagnosis    CVA (cerebral vascular accident) ([facility] [address] [I63.9]    Hypertensive urgency, malignant [I16.0]    Hypomagnesemia [E83.42]    Syncope [R55]    Tobacco abuse [Z72.0]    Hypokalemia [E87.6]    Thyroid nodule [E04.1]       Malignant hypertensive urgency  Patient admitted to ICU for initiation of labetalol drip with titration parameters placed  Goal of 25% BP reduction initially to avoid complications of overly aggressive correction  Thyroid studies, UDS and ethanol level pending  IV Lasix 10 mg x 1 dose overnight with strict I's and O's  Initiate lisinopril 20 mg daily in a.m.   IV labetalol every 4 hours as needed with BP parameters placed  Patient educated in detail regarding morbidity mortality associated with noncompliance with antihypertensive regimen Consultation placed to cardiology for further recommendations. Nephrology consulted and appreciated.      CVA - acute thromboembolic likely. MRI confirmed. Echo completed w/out obvious source. Carotid dopplers ordered and pending. Neurology consulted and appreciated in advance. Continue ASA/Statin for 2nd prevention and risk reduction. HypoKalemia - etiology clinically unable to determine. Follow serial labs and replace PRN. Reviewed and documented as above. HypoMagnesemia - etiology clinically unable to determine. Follow serial labs and replace PRN. Reviewed and documented as above.       Tobacco Abuse - active and ongoing. Cessation counseled. Nicotine replacement PRN. Obesity -  With Body mass index is 33.47 kg/m². Complicating assessment and treatment. Placing patient at risk for multiple co-morbidities as well as early death and contributing to the patient's presentation. Counseled on weight loss.       DVT Prophylaxis: LMWH/IPC  Diet: Diet NPO Time Specified  DIET CARDIAC; No Added Salt (3-4 GM); Daily Fluid Restriction: 1800 ml; No Caffeine  Code Status: Full Code      PT/OT Eval Status: not yet ordered. Dispo - Remains in ICU. Here for the foreseeable future.      Ash Collazo MD

## 2021-03-02 NOTE — CONSULTS
[facility]  Consult Note           Reason for Consult:  Hypokalemia, hypomagnesemia, Hypertension  Requesting Physician:  Dr. Iris Powers    Chief Complaint:  Hypertensive urgency  History Obtained From:  patient, electronic medical record    History of Present Ilness:    64year old female with hx of HTN admitted with HTN urgency. We have been asked to assist in further mgmt of Hypokalemia, hypomagnesemia, HTN. States has a history of HTN for ~20 years which she has not been taking her BP medications regularly. Has been on lisinopril and a water pill in past.  Had syncopal episode over weekend - does not remember events other than it was preceded by lightheadedness, had vomiting afterwards. Denies any chest pain, sob, abdominal pain, decreased intake, diarrhea, or change in weight. BP's were as high as ~260's/100's on admission, better now with coreg, lisinopril, prn lasix, hydralazine. Past Medical History:        Diagnosis Date    Hypertension        Past Surgical History:        Procedure Laterality Date    CHOLECYSTECTOMY      HYSTERECTOMY         Home Medications:    No current facility-administered medications on file prior to encounter.       Current Outpatient Medications on File Prior to Encounter   Medication Sig Dispense Refill    Acetaminophen-Aspirin Buffered ([address]) 250-250 MG TABS Take by mouth as needed      EPINEPHrine (EPIPEN 2-LEONA) 0.3 MG/0.3ML SOAJ injection Inject 0.3 mLs into the muscle once for 1 dose Use as directed for allergic reaction (Patient not taking: Reported on 3/1/2021) 0.3 mL 1    omeprazole (PRILOSEC) 20 MG capsule Take 20 mg by mouth daily      EPINEPHrine (EPIPEN) 0.3 MG/0.3ML THANG injection Use as directed for allergic reaction (Patient not taking: Reported on 3/1/2021) 2 Device 3       Allergies:  Bee pollen and Varenicline    Social History:    Social History     Socioeconomic History    Marital status: Single Spouse name: Not on file    Number of children: Not on file    Years of education: Not on file    Highest education level: Not on file   Occupational History    Not on file   Social Needs    Financial resource strain: Not on file    Food insecurity     Worry: Not on file     Inability: Not on file    Transportation needs     Medical: Not on file     Non-medical: Not on file   Tobacco Use    Smoking status: Current Every Day Smoker     Packs/day: 0.50     Years: 40.00     Pack years: 20.00     Types: Cigarettes     Start date: 36    Smokeless tobacco: Never Used   Substance and Sexual Activity    Alcohol use: No    Drug use: No    Sexual activity: Not on file   Lifestyle    Physical activity     Days per week: Not on file     Minutes per session: Not on file    Stress: Not on file   Relationships    Social connections     Talks on phone: Not on file     Gets together: Not on file     Attends [employer] service: Not on file     Active member of club or organization: Not on file     Attends meetings of clubs or organizations: Not on file     Relationship status: Not on file    Intimate partner violence     Fear of current or ex partner: Not on file     Emotionally abused: Not on file     Physically abused: Not on file     Forced sexual activity: Not on file   Other Topics Concern    Not on file   Social History Narrative    Not on file       Family History:   No history of kidney disease. Review of Systems:   Pertinent positives stated above in HPI. Remainder of 10 point review of systems were reviewed and were negative.     Physical exam:   Constitutional:  VITALS:  BP (!) 179/82   Pulse 72   Temp 98.8 °F (37.1 °C) (Oral)   Resp 16   Ht 5' 4\" (1.626 m)   Wt 195 lb (88.5 kg)   SpO2 98%   BMI 33.47 kg/m²   Gen: alert, awake, nad  Skin: no rash, turgor wnl  Heent:  eomi, mmm  Neck: no bruits or jvd noted, thyroid normal  Cardiovascular:  S1, S2 without m/r/g Respiratory: CTA B without w/r/r; respiratory effort normal  Abdomen:  +bs, soft, nt, nd, no hepatosplenomegaly  Ext: no lower extremity edema  Psychiatric: mood and affect appropriate; judgement and insight intact  Musculoskeletal:  Rom, muscular strength limited; digits, nails normal    Data/  CBC:   Lab Results   Component Value Date    WBC 10.2 03/02/2021    RBC 4.67 03/02/2021    HGB 13.2 03/02/2021    HCT 39.4 03/02/2021    MCV 84.2 03/02/2021    MCH 28.3 03/02/2021    MCHC 33.6 03/02/2021    RDW 13.7 03/02/2021     03/02/2021    MPV 9.2 03/02/2021     BMP:    Lab Results   Component Value Date     03/02/2021    K 3.2 03/02/2021    CL 99 03/02/2021    CO2 31 03/02/2021    BUN 8 03/02/2021    LABALBU 3.8 03/02/2021    CREATININE 0.7 03/02/2021    CALCIUM 8.8 03/02/2021    GFRAA >60 03/02/2021    LABGLOM >60 03/02/2021    GLUCOSE 110 03/02/2021         Assessment/    - Hypertensive urgency - in setting of non-compliance, concern for possible secondary cause of HTN    - Hypokalemia   K running in low 3 range previously from 2020, may suggest underlying hyperaldosterone state    - Hypomagnesemia - prn replacement    - Syncope - plans per Cardiology      Plan/    - Screen for hyperaldosteronism, pheochromocytoma, & cushing's syndrome  - Continue lisinopril, coreg, prn hydralazine for BP control  - 2D echocardiogram  - UA with microscopy to assess for proteinuria  - Trend labs, bp's      Thank you for the consultation. Please do not hesitate to call with questions.     [employer]

## 2021-03-02 NOTE — PROGRESS NOTES
03/02/2021 @ [phone] Consult placed to [facility] [address] [place] [address]

## 2021-03-03 PROBLEM — F17.200 SMOKER: Status: ACTIVE | Noted: 2021-01-01

## 2021-03-03 NOTE — PROGRESS NOTES
A Maria Esther Myoview stress test was completed on this patient as ordered. The patient tolerated the procedure well. Awaiting stress imaging at this time.

## 2021-03-03 NOTE — PROGRESS NOTES
Hospitalist Progress Note      PCP: No primary care provider on file. Date of Admission: 3/1/2021    Chief Complaint: Syncope/HTN    Subjective: no new c/o. Medications:  Reviewed    Infusion Medications    labetalol (NORMODYNE) 1 mg/mL infusion       Scheduled Medications    mupirocin   Nasal BID    aspirin  81 mg Oral Daily    amLODIPine  10 mg Oral Daily    carvedilol  12.5 mg Oral BID WC    pantoprazole  40 mg Oral QAM AC    sodium chloride flush  10 mL Intravenous 2 times per day    enoxaparin  40 mg Subcutaneous Daily    nicotine  1 patch Transdermal Daily    lisinopril  20 mg Oral Daily    atorvastatin  40 mg Oral Nightly     PRN Meds: ipratropium-albuterol, hydrALAZINE, labetalol, sodium chloride flush, promethazine **OR** ondansetron, acetaminophen **OR** acetaminophen, senna    No intake or output data in the 24 hours ending 03/03/21 0944    Physical Exam Performed:    BP (!) 151/81   Pulse 66   Temp 98.4 °F (36.9 °C) (Oral)   Resp 16   Ht 5' 4\" (1.626 m)   Wt 195 lb (88.5 kg)   SpO2 96%   BMI 33.47 kg/m²     General appearance: No apparent distress, appears stated age and cooperative. HEENT: Pupils equal, round, and reactive to light. Conjunctivae/corneas clear. Neck: Supple, with full range of motion. No jugular venous distention. Trachea midline. Respiratory:  Normal respiratory effort. Clear to auscultation, bilaterally without Rales/Wheezes/Rhonchi. Cardiovascular: Regular rate and rhythm with normal S1/S2 without murmurs, rubs or gallops. Abdomen: Soft, non-tender, non-distended with normal bowel sounds. Musculoskeletal: No clubbing, cyanosis or edema bilaterally. Full range of motion without deformity. Skin: Skin color, texture, turgor normal.  No rashes or lesions. Neurologic:  Neurovascularly intact without any focal sensory/motor deficits.  Cranial nerves: II-XII intact, grossly non-focal. Psychiatric: Alert and oriented, thought content appropriate, normal insight  Capillary Refill: Brisk,< 3 seconds   Peripheral Pulses: +2 palpable, equal bilaterally       Labs:   Recent Labs     03/01/21  1157 03/02/21 0429 03/03/21  0717   WBC 10.6 10.2 9.2   HGB 14.7 13.2 13.4   HCT 42.7 39.4 39.8    251 227     Recent Labs     03/01/21  2306 03/02/21 0429 03/03/21  0717   * 139 137   K 2.9* 3.2* 3.5   CL 95* 99 100   CO2 30 31 27   BUN 8 8 11   CREATININE 0.9 0.7 0.7   CALCIUM 8.8 8.8 9.4   PHOS 3.6  --  3.6     Recent Labs     03/01/21 1157 03/02/21 0429   AST 14* 9*   ALT <5* <5*   BILITOT 0.9 0.9   ALKPHOS 110 87     No results for input(s): INR in the last 72 hours. Recent Labs     03/01/21 1157 03/01/21  1916 03/01/21 2306   TROPONINI <0.01 <0.01 <0.01       Urinalysis:    No results found for: NITRU, WBCUA, BACTERIA, RBCUA, BLOODU, SPECGRAV, GLUCOSEU    Consults:    IP CONSULT TO HOSPITALIST  IP CONSULT TO CARDIOLOGY  IP CONSULT TO NEPHROLOGY  IP CONSULT TO NEUROLOGY      Assessment/Plan:    Active Hospital Problems    Diagnosis    CVA (cerebral vascular accident) ([facility] [address] [I63.9]    Hypertensive urgency, malignant [I16.0]    Hypomagnesemia [E83.42]    Syncope [R55]    Tobacco abuse [Z72.0]    Hypokalemia [E87.6]    Thyroid nodule [E04.1]       Malignant hypertensive urgency  Patient admitted to ICU for initiation of labetalol drip with titration parameters placed. Now off and controlled on new medication regimen. Consultation placed to cardiology for further recommendations. Nephrology consulted and appreciated.      CVA - acute thromboembolic likely. MRI confirmed. Echo completed w/out obvious source. Carotid dopplers ordered and pending. Neurology consulted and appreciated in advance. Continue ASA/Statin for 2nd prevention and risk reduction. HypoKalemia - etiology clinically unable to determine. Follow serial labs and replace PRN. Reviewed and documented as above. HypoMagnesemia - etiology clinically unable to determine. Follow serial labs and replace PRN. Reviewed and documented as above.       Tobacco Abuse - active and ongoing. Cessation counseled. Nicotine replacement PRN. Obesity -  With Body mass index is 33.47 kg/m². Complicating assessment and treatment. Placing patient at risk for multiple co-morbidities as well as early death and contributing to the patient's presentation. Counseled on weight loss.       DVT Prophylaxis: LMWH/IPC  Diet: Diet NPO Time Specified  Code Status: Full Code      PT/OT Eval Status: not yet ordered. Dispo -  Possibly to home Thurs/Friday 4/5 March pending clinical course and subspecialty recs.       David Castillo MD

## 2021-03-03 NOTE — PROGRESS NOTES
Cardiology    Patient was off the floor for testing when rounding this AM  Echocardiogram and stress test reviewed. There does not appear to be any evidence for underlying structural heart disease.

## 2021-03-03 NOTE — PROGRESS NOTES
[facility]       Progress Note           Subjective/   64y.o. year old female who we are seeing in consultation for HTN, Hypokalemia. HPI:  BP trending better with medications. Denies any sob. ROS:  Intake adequate, no weakness.     Objective/   GEN:  Chronically ill, /77   Pulse 79   Temp 98.2 °F (36.8 °C) (Oral)   Resp 18   Ht 5' 4\" (1.626 m)   Wt 195 lb (88.5 kg)   SpO2 96%   BMI 33.47 kg/m²   HEENT: non-icteric, no JVD  CV: S1, S2 without m/r/g; no LE edema  RESP: CTA B without w/r/r; breathing wnl  ABD: +bs, soft, nt, no hsm  SKIN: warm, no rashes    Data/  Recent Labs     03/01/21  1157 03/02/21  0429 03/03/21  0717   WBC 10.6 10.2 9.2   HGB 14.7 13.2 13.4   HCT 42.7 39.4 39.8   MCV 85.0 84.2 86.0    251 227     Recent Labs     03/01/21  2306 03/02/21  0429 03/03/21  0717   * 139 137   K 2.9* 3.2* 3.5   CL 95* 99 100   CO2 30 31 27   GLUCOSE 155* 110* 119*   PHOS 3.6  --  3.6   MG 1.70* 1.80 1.70*   BUN 8 8 11   CREATININE 0.9 0.7 0.7   LABGLOM >60 >60 >60   GFRAA >60 >60 >60       Assessment/     - Hypertensive urgency - in setting of non-compliance, concern for possible secondary cause of HTN     - Hypokalemia              K running in low 3 range previously from 2020, may suggest underlying hyperaldosterone state     - Hypomagnesemia - prn replacement     - Syncope - plans per Cardiology    - CHF - EF 43-18%, grade 1 diastolic dysfunction    Plan/     - Screening for hyperaldosteronism, pheochromocytoma, & cushing's syndrome  - Continue lisinopril, coreg, prn hydralazine for BP control  - UA with microscopy to assess for proteinuria  - Trend labs, bp's    Okay for discharge

## 2021-03-03 NOTE — CONSULTS
In patient Neurology consult        [facility] Neurology      MD Kendrick Rose  1960    Date of Service: 3/3/2021    Referring Physician: Cliff Trujillo MD      Reason for the consult and CC: New onset headache and dizziness. Hypertensive urgency. HPI:   The patient is a 64y.o.  years old female with history of hypertension and smoking who was admitted to the hospital yesterday from PCP with blood pressure above 406 systolically. Patient describes intermittent dizziness for the last few days. Degree was moderate to severe and duration was persistent. No chest pain or severe headaches. No visual changes. No other triggers or other aggravating factors except increasing stressors over the last few days. She went to her PCP and checked her blood pressure which was at least 266/144. She was somewhat confused and she was advised to go to the ER for possible hypertensive urgency. Initial CT head showed no acute findings. CTA showed no large vessel occlusion. Patient was admitted to the hospital.  Patient stayed in the ICU for 24 hours and then back to the floor. She feels better today. No headache or dizziness or lightheadedness. No chest pain, weakness or numbness or tingling. Blood pressure now has been in the 737740 systolic. She was placed back on her regular normal blood pressure medications. She was also placed on aspirin and nicotine patch. She denies any prior history of strokes or TIA. She was not consistent with blood pressure medications at home. She was not measuring her blood pressure more frequently. Further work-up with MRI brain which I did review today and showed nonspecific increased flair signal around frontal horn of the left ventricle in the corpus callosum. Blood test reviewed today. Other review of system was unremarkable.       Family history: Noncontributory      Past Surgical History:   Procedure Laterality Date    CHOLECYSTECTOMY  HYSTERECTOMY          Past Medical History:   Diagnosis Date    Hypertension      Social History     Tobacco Use    Smoking status: Current Every Day Smoker     Packs/day: 0.50     Years: 40.00     Pack years: 20.00     Types: Cigarettes     Start date: 36    Smokeless tobacco: Never Used   Substance Use Topics    Alcohol use: No    Drug use: No     Allergies   Allergen Reactions    Bee Pollen Swelling    Varenicline Nausea And Vomiting     Current Facility-Administered Medications   Medication Dose Route Frequency Provider Last Rate Last Admin    ipratropium-albuterol (DUONEB) nebulizer solution 1 ampule  1 ampule Inhalation Q4H PRN Barrington Morin MD        mupirocin (BACTROBAN) 2 % ointment   Nasal BID Georgia Watkins MD   Given at 03/02/21 2004    aspirin chewable tablet 81 mg  81 mg Oral Daily Georgia Watkins MD   81 mg at 03/03/21 1137    amLODIPine (NORVASC) tablet 10 mg  10 mg Oral Daily Georgia Watkins MD   10 mg at 03/03/21 1137    carvedilol (COREG) tablet 12.5 mg  12.5 mg Oral BID  Georgia Watkins MD        hydrALAZINE (APRESOLINE) injection 20 mg  20 mg Intravenous Q4H PRN Georgia Watkins MD   20 mg at 03/02/21 1746    labetalol (NORMODYNE;TRANDATE) injection 10 mg  10 mg Intravenous Q4H PRN Georgia Watkins MD        pantoprazole (PROTONIX) tablet 40 mg  40 mg Oral QAM AC Barrington Morin MD   40 mg at 03/02/21 4171    sodium chloride flush 0.9 % injection 10 mL  10 mL Intravenous 2 times per day Barrington Morin MD   10 mL at 03/03/21 1138    sodium chloride flush 0.9 % injection 10 mL  10 mL Intravenous PRN Barrington Morin MD        enoxaparin (LOVENOX) injection 40 mg  40 mg Subcutaneous Daily Barrington Morin MD   40 mg at 03/02/21 0900    promethazine (PHENERGAN) tablet 12.5 mg  12.5 mg Oral Q6H PRN Barrington Morin MD        Or    ondansetron (ZOFRAN) injection 4 mg  4 mg Intravenous Q6H PRN Barrington Morin MD  acetaminophen (TYLENOL) tablet 650 mg  650 mg Oral Q6H PRN Teetee Segal MD        Or   Nina Brown acetaminophen (TYLENOL) suppository 650 mg  650 mg Rectal Q6H PRN Teetee Segal MD        senna (SENOKOT) tablet 8.6 mg  1 tablet Oral Daily PRN Teetee Segal MD        nicotine (NICODERM CQ) 21 MG/24HR 1 patch  1 patch Transdermal Daily Teetee Segal MD   1 patch at 03/03/21 1137    labetalol (NORMODYNE;TRANDATE) 300 mg in dextrose 5 % 300 mL infusion  0.5-5 mg/min Intravenous Continuous Teetee Segal MD        lisinopril (PRINIVIL;ZESTRIL) tablet 20 mg  20 mg Oral Daily Teetee Segal MD   20 mg at 03/03/21 1137    atorvastatin (LIPITOR) tablet 40 mg  40 mg Oral Nightly Teetee Segal MD   40 mg at 03/02/21 2127       ROS : A 10-14 system review of constitutional, cardiovascular, respiratory, eyes, musculoskeletal, endocrine, GI, ENT, skin, hematological, genitourinary, psychiatric and neurologic systems was obtained and updated today and is unremarkable except as mentioned in my HPI      Exam:     Constitutional:   Vitals:    03/02/21 2132 03/03/21 0104 03/03/21 0357 03/03/21 1030   BP: (!) 150/73 (!) 153/71 (!) 151/81 136/77   Pulse: 70 65 66 79   Resp: 16 16 16 18   Temp: 98.5 °F (36.9 °C) 98.4 °F (36.9 °C) 98.4 °F (36.9 °C) 98.2 °F (36.8 °C)   TempSrc: Oral Oral Oral Oral   SpO2: 95% 96% 96% 96%   Weight:       Height:           General appearance and observation: Normal development and appear in no acute distress. Eye:  Fundus: No blurring of optic disc. Neck: supple  Cardiovascular: No lower leg edema with good pulsation. No carotid bruit. Mental Status:   Oriented to person, place, problem, and time. Memory: Good immediate recall. Intact remote memory  Normal attention span and concentration. Language: intact naming, repeating and fluency   Good fund of Knowledge.  Aware of current events and vocabulary   Cranial Nerves: II: Visual fields: Full to confrontation and nl [facility]. Pupils: equal, round, reactive to light  III,IV,VI: Extra Ocular Movements are intact. No nystagmus  V: Facial sensation is intact  VII: Facial strength and movements: intact and symmetric  VIII: Hearing: Intact to finger rub bilaterally  IX: Palate elevation is symmetric  XI: Shoulder shrug is intact  XII: Tongue movements are normal  Musculoskeletal: 5/5 in all 4 extremities. Tone: Normal tone. Reflexes: Symmetric 2+ in the arms and 2+ in the legs   Planters: flexor bilaterally. Coordination: no pronator drift, no dysmetria with FNF in upper extremities. Normal REM. Sensation: normal to all modalities in both arms and legs. Gait/Posture: steady gait and normal posturing and station. Data:  LABS:   Lab Results   Component Value Date     03/03/2021    K 3.5 03/03/2021    K 3.2 03/02/2021     03/03/2021    CO2 27 03/03/2021    BUN 11 03/03/2021    CREATININE 0.7 03/03/2021    GFRAA >60 03/03/2021    LABGLOM >60 03/03/2021    GLUCOSE 119 03/03/2021    PHOS 3.6 03/03/2021    MG 1.70 03/03/2021    CALCIUM 9.4 03/03/2021     Lab Results   Component Value Date    WBC 9.2 03/03/2021    RBC 4.62 03/03/2021    HGB 13.4 03/03/2021    HCT 39.8 03/03/2021    MCV 86.0 03/03/2021    RDW 13.9 03/03/2021     03/03/2021   No results found for: INR, PROTIME    Neuroimaging were independently reviewed by myself and discussed results with the patient  Reviewed notes from different physicians  Reviewed lab and blood testing    Impression:  New onset dizziness, headache and confusion. Likely related to hypertensive urgency. ? Abnormal MRI of the brain mainly in the corpus callosum of unclear clinical significance. Likely due to hypertensive emergency or nonspecific. ?  Subacute cerebral ischemia secondary to poorly controlled hypertension.   Agree to repeat MRI of the brain within 2 months with contrast.  Hypertension, not controlled  Hyperlipidemia Smoker  Substance abuse    Recommendation:  Aspirin  Statin  No smoking  Measure monitor blood pressure frequently at home  Follow-up with her new PCP regarding further stroke prevention  Nicotine patch  A1c  Discussed stroke prevention and results of MRI with the patient  Telemetry  DVT and GI prophylaxis  Follow-up MRI brain with contrast in 2 months. She can call my office or have her PCP ordering such testing. Continue current blood pressure medications  PT and OT  Can be discharged when medically stable  No further recommendation. Thank you for referring such patient. If you have any questions regarding my consult note, please don't hesitate to call me. Rosalio Pleitez MD  [phone]    This dictation was generated by voice recognition computer software.  Although all attempts are made to edit the dictation for accuracy, there may be errors in the  transcription that are not intended

## 2021-03-03 NOTE — FLOWSHEET NOTE
Spiritual support visit. Patient said she was doing better after having stroke and high blood pressure. 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 She gave me permission to pray for her. 03/03/21 1443   Encounter Summary   Services provided to: Patient   Referral/Consult From: Family;Rounding   Support System Children;Family members   Continue Visiting   (3/3/21/Siritual support visit. Prayed together. Jamar Prim )   Complexity of Encounter Moderate   Length of Encounter 15 minutes   Spiritual/Samaritan   Type Spiritual support   Assessment Approachable;Coping;Peaceful   Intervention Active listening;Explored feelings, thoughts, concerns;Nurtured hope;Prayer;Scripture;Sustaining presence/ Ministry of presence   Outcome Expressed gratitude;Engaged in conversation

## 2021-03-04 NOTE — PROGRESS NOTES
Hospitalist Progress Note      PCP: No primary care provider on file. Date of Admission: 3/1/2021    Chief Complaint: Syncope/HTN    Subjective: no new c/o. Medications:  Reviewed    Infusion Medications    labetalol (NORMODYNE) 1 mg/mL infusion       Scheduled Medications    aspirin  81 mg Oral Daily    amLODIPine  10 mg Oral Daily    carvedilol  12.5 mg Oral BID WC    pantoprazole  40 mg Oral QAM AC    sodium chloride flush  10 mL Intravenous 2 times per day    enoxaparin  40 mg Subcutaneous Daily    nicotine  1 patch Transdermal Daily    lisinopril  20 mg Oral Daily    atorvastatin  40 mg Oral Nightly     PRN Meds: ipratropium-albuterol, hydrALAZINE, labetalol, sodium chloride flush, promethazine **OR** ondansetron, acetaminophen **OR** acetaminophen, senna    No intake or output data in the 24 hours ending 03/04/21 0840    Physical Exam Performed:    BP (!) 183/78   Pulse 67   Temp 97.8 °F (36.6 °C) (Oral)   Resp 16   Ht 5' 4\" (1.626 m)   Wt 195 lb (88.5 kg)   SpO2 99%   BMI 33.47 kg/m²     General appearance: No apparent distress, appears stated age and cooperative. HEENT: Pupils equal, round, and reactive to light. Conjunctivae/corneas clear. Neck: Supple, with full range of motion. No jugular venous distention. Trachea midline. Respiratory:  Normal respiratory effort. Clear to auscultation, bilaterally without Rales/Wheezes/Rhonchi. Cardiovascular: Regular rate and rhythm with normal S1/S2 without murmurs, rubs or gallops. Abdomen: Soft, non-tender, non-distended with normal bowel sounds. Musculoskeletal: No clubbing, cyanosis or edema bilaterally. Full range of motion without deformity. Skin: Skin color, texture, turgor normal.  No rashes or lesions. Neurologic:  Neurovascularly intact without any focal sensory/motor deficits.  Cranial nerves: II-XII intact, grossly non-focal.  Psychiatric: Alert and oriented, thought content appropriate, normal insight Capillary Refill: Brisk,< 3 seconds   Peripheral Pulses: +2 palpable, equal bilaterally       Labs:   Recent Labs     03/01/21  1157 03/02/21  0429 03/03/21  0717   WBC 10.6 10.2 9.2   HGB 14.7 13.2 13.4   HCT 42.7 39.4 39.8    251 227     Recent Labs     03/01/21  2306 03/02/21  0429 03/03/21  0717 03/04/21  0638   * 139 137 138   K 2.9* 3.2* 3.5 3.8   CL 95* 99 100 100   CO2 30 31 27 30   BUN 8 8 11 12   CREATININE 0.9 0.7 0.7 0.7   CALCIUM 8.8 8.8 9.4 9.4   PHOS 3.6  --  3.6  --      Recent Labs     03/01/21  1157 03/02/21  0429   AST 14* 9*   ALT <5* <5*   BILITOT 0.9 0.9   ALKPHOS 110 87     No results for input(s): INR in the last 72 hours. Recent Labs     03/01/21  1157 03/01/21  1916 03/01/21  2306   TROPONINI <0.01 <0.01 <0.01       Urinalysis:      Lab Results   Component Value Date    NITRU Negative 03/03/2021    WBCUA 3-5 03/03/2021    BACTERIA Rare 03/03/2021    RBCUA 0-2 03/03/2021    BLOODU Negative 03/03/2021    SPECGRAV 1.025 03/03/2021    GLUCOSEU Negative 03/03/2021       Consults:    IP CONSULT TO HOSPITALIST  IP CONSULT TO CARDIOLOGY  IP CONSULT TO NEPHROLOGY  IP CONSULT TO NEUROLOGY      Assessment/Plan:    Active Hospital Problems    Diagnosis    HTN (hypertension), benign [I10]    Dyslipidemia [E78.5]    CVA (cerebral vascular accident) ([facility] [address] [I63.9]    Hypertensive urgency, malignant [I16.0]    Hypomagnesemia [E83.42]    Syncope [R55]    Smoker [F17.200]    Hypokalemia [E87.6]    Thyroid nodule [E04.1]    Hypertensive urgency [I16.0]       Malignant hypertensive urgency  Patient admitted to ICU for initiation of labetalol drip with titration parameters placed. Now off and controlled on new medication regimen. Consultation placed to cardiology for further recommendations.    Nephrology consulted and appreciated.

## 2021-03-04 NOTE — PROGRESS NOTES
Pt with episodes of 2nd degree heart block this shift with HR in the 30s. Pt currently with HR is the 50s-60s.  Theodore Lazaro made aware Briana Every, RN

## 2021-03-04 NOTE — PROGRESS NOTES
Discharge orders placed by MD, verified by RN prior to discharge.  denies any discharge needs for the patient. Discharge paperwork complete, reviewed, and signed with the patient. A copy of all paperwork provided. All questions and concerns resolved at the time of review. Patient verbalized understanding of paperwork. Scripts provided for patient. RN removed the tele box, informed [facility] of discharge, and removed IV prior to discharge.  Patient discharged to home with spouse, ambulated independently out of the hospital.

## 2021-03-04 NOTE — PROGRESS NOTES
[facility]   Daily Progress Note    Admit Date:  3/1/2021  HPI:    Chief Complaint   Patient presents with    Hypertension     pt reporting a syncopical episode over the weekend but reports she refused transport to the ED. Went to PCP today due to the episode and was sent to the ED due to hypertension. Interval history: Lizabeth Nixon is being followed for dizziness, possible syncope. Hx of HTN. Subjective:  Ms. Mirian Villafuerte denies any dizziness, no chest pain. No shortness of breath.      Objective:   BP (!) 129/54   Pulse 56   Temp 97.8 °F (36.6 °C) (Oral)   Resp 16   Ht 5' 4\" (1.626 m)   Wt 195 lb (88.5 kg)   SpO2 99%   BMI 33.47 kg/m²       Intake/Output Summary (Last 24 hours) at 3/4/2021 1035  Last data filed at 3/4/2021 1007  Gross per 24 hour   Intake 120 ml   Output    Net 120 ml       Physical Exam:  General:  Awake, alert, NAD, appears older than stated age  Skin:  Warm and dry  Neck:  JVD<8  Chest:  Clear to auscultation, no wheezes/rhonchi/rales  Telemetry: NSR  Cardiovascular:  RRR S1S2, no m/r/g   Abdomen:  Soft, nontender, +bowel sounds  Extremities:  No  bilateral lower extremity edema    Medications:    magnesium sulfate  2,000 mg Intravenous Once    aspirin  81 mg Oral Daily    amLODIPine  10 mg Oral Daily    carvedilol  12.5 mg Oral BID WC    pantoprazole  40 mg Oral QAM AC    sodium chloride flush  10 mL Intravenous 2 times per day    enoxaparin  40 mg Subcutaneous Daily    nicotine  1 patch Transdermal Daily    lisinopril  20 mg Oral Daily    atorvastatin  40 mg Oral Nightly      labetalol (NORMODYNE) 1 mg/mL infusion         Lab Data:  CBC:   Recent Labs     03/01/21  1157 03/02/21  0429 03/03/21  0717   WBC 10.6 10.2 9.2   HGB 14.7 13.2 13.4    251 227     BMP:    Recent Labs     03/02/21  0429 03/03/21  0717 03/04/21  0638    137 138   K 3.2* 3.5 3.8   CO2 31 27 30   BUN 8 11 12   CREATININE 0.7 0.7 0.7 INR:  No results for input(s): INR in the last 72 hours. BNP:    Recent Labs     21  1157   PROBNP 484*     No results found for: LVEF, LVEFMODE    Testing:  Cardiac ct calcium 3/2/21  FINDINGS:   LEFT MAIN: Zero (0).     RIGHT CORONARY ARTERY: Zero (0).     LEFT ANTERIOR DESCENDIN       CIRCUMFLEX: Zero (0).       TOTAL AG[place]STON CALCIUM SCORE: 323     Carotids 3/3/21      Summary        The bilateral internal carotid arteries reveal <50% diameter reducing    stenosis.    The bilateral vertebral arteries have normal antegrade flow. Echo 3/2/21   Summary   Technically difficult examination. Normal left ventricular systolic function with ejection fraction of 55-60%. No regional wall motion abnormalites are seen. Mild concentric left ventricular hypertrophy. Grade I diastolic dysfunction with normal filling pressure. STress testing 3/3/21      Summary    There is no definite evidence of stress induced ischemia. Inhomogeneous    tracer activity noted. There is tracer activity in small bowel adjacent to    inferior wall which may affect interpretation. LV function is normal with    uniform wall motion and ejection fraction of 69 %. Lower risk study. MRI brain 3/2/21  FINDINGS:   INTRACRANIAL STRUCTURES/VENTRICLES: There is mild chronic white matter   microvascular ischemic disease characterized by periventricular white matter   signal abnormality.  A more confluent area of signal abnormality is seen   within the corpus callosum, on the left hand side, abutting the frontal horn   of the left lateral ventricle and measuring approximately 1.8 cm in diameter. This is lesion appears to demonstrate partial diffusion restriction and could   reflect an acute to subacute infarct, although infarcts in the corpus   callosum are unusual.       Chronic lacunar infarcts are noted within the basal ganglia bilaterally.    Normal expected signal voids are seen within the vessels at the base of skull.  No bleed or shift is identified.       ORBITS: The visualized portion of the orbits demonstrate no acute abnormality.       SINUSES: The visualized paranasal sinuses and mastoid air cells are well   aerated.       BONES/SOFT TISSUES: A midline cystic mass is seen within the nasopharynx   measuring up to 2.2 cm in diameter, consistent with a Tornwaldt cyst.       Principal Problem:    Hypertensive urgency, malignant  Active Problems:    Hypomagnesemia    Syncope    Smoker    Hypokalemia    Thyroid nodule    Hypertensive urgency    CVA (cerebral vascular accident) ([address]    HTN (hypertension), benign    Dyslipidemia  Resolved Problems:    * No resolved hospital problems. *      Assessment:  Hypertensive urgency  Syncope, possible CVA/abnromal MRI of brain  HLD  CAD     Plan:  Continue aspirin and statin  Blood pressure control  Weight loss  Continue beta blocker    Discussed cardiac testing with patient, no further recommendations okay for discharge.      Mark Kennedy CNP, 3/4/2021, 10:35 AM

## 2021-03-05 PROBLEM — F17.200 TOBACCO USE DISORDER: Status: ACTIVE | Noted: 2021-01-01

## 2021-03-05 PROBLEM — K21.9 GERD (GASTROESOPHAGEAL REFLUX DISEASE): Status: ACTIVE | Noted: 2021-01-01

## 2021-03-05 PROBLEM — M47.816 LUMBAR SPONDYLOSIS: Status: ACTIVE | Noted: 2021-01-01

## 2021-03-05 NOTE — PROGRESS NOTES
Patient took BP before meds 199/79  Patient took BP after meds (1 hr) 179/89  Patient took BP before appt 135/64

## 2021-03-05 NOTE — DISCHARGE SUMMARY
Hospital Medicine Discharge Summary    Patient ID: Teddy Collier      Patient's PCP: No primary care provider on file. Admit Date: 3/1/2021     Discharge Date: 3/4/2021      Admitting Physician: Pat Riojas MD     Discharge Physician: Carson Wayne MD     Discharge Diagnoses: Active Hospital Problems    Diagnosis    HTN (hypertension), benign [I10]    Dyslipidemia [E78.5]    CVA (cerebral vascular accident) ([address] [I63.9]    Hypertensive urgency, malignant [I16.0]    Hypomagnesemia [E83.42]    Syncope [R55]    Smoker [F17.200]    Hypokalemia [E87.6]    Thyroid nodule [E04.1]    Hypertensive urgency [I16.0]       The patient was seen and examined on day of discharge and this discharge summary is in conjunction with any daily progress note from day of discharge. Hospital Course:       Malignant hypertensive urgency  Patient admitted to ICU for initiation of labetalol drip with titration parameters placed. Consultation placed to cardiology for further recommendations. Nephrology consulted and appreciated.      CVA - acute thromboembolic likely. MRI confirmed. Echo completed w/out obvious source. Carotid dopplers negative. Neurology consulted and appreciated. Continue ASA/Statin for 2nd prevention and risk reduction.      HypoKalemia - etiology clinically unable to determine. Followed serial labs and replaced PRN.      HypoMagnesemia - etiology clinically unable to determine. Followed serial labs and replaced PRN.     Tobacco Abuse - active and ongoing. Cessation counseled. Nicotine replacement PRN.     Obesity -  With Body mass index is 33.47 kg/m². Complicating assessment and treatment. Placing patient at risk for multiple co-morbidities as well as early death and contributing to the patient's presentation. Counseled on weight loss.       Labs:  For convenience and continuity at follow-up the following most recent labs are provided:      CBC:    Lab Results Component Value Date    WBC 9.2 03/03/2021    HGB 13.4 03/03/2021    HCT 39.8 03/03/2021     03/03/2021       Renal:    Lab Results   Component Value Date     03/04/2021    K 3.8 03/04/2021    K 3.2 03/02/2021     03/04/2021    CO2 30 03/04/2021    BUN 12 03/04/2021    CREATININE 0.7 03/04/2021    CALCIUM 9.4 03/04/2021    PHOS 3.6 03/03/2021         Significant Diagnostic Studies    Radiology:   VL DUP CAROTID BILATERAL   Final Result      NM Cardiac Stress Test Nuclear Imaging   Final Result      CT CARDIAC CALCIUM SCORING   Final Result   Total calcium score of 323 is within the 90th percentile for the patient's   age of 65 y/o . No incidental clinically important extracardiac CT findings. Calcium Score Interpretation      0  No identifiable atherosclerotic plaque. Very low cardiovascular disease   risk. Less than 5% chance of presence of coronary artery disease. A   negative examination. 1-10 Minimal plaque burden. Significant coronary artery disease very   unlikely.  Mild plaque burden. Likely mild or minimal coronary stenosis. 101-400 Moderate plaque burden. Moderate non-obstructive coronary artery   disease highly likely. Over 400 Extensive plaque burden. High likelihood of at least one   significant coronary artery stenosis (>50% diameter). CALCIUM SCORING OVERVIEW:      Coronary calcium is a marker for plaque in a blood vessel or atherosclerosis   (hardening of the arteries). The presence and amount of calcium detected in   the coronary artery by the CT scan estimates the presence and amount of   atherosclerotic plaque. These calcium deposits can appear years before the   development of heart disease symptoms such as chest pain and shortness of   breath. A calcium score is computed for each of the coronary arteries based upon the   volume and density of the calcium deposits.   This can be referred to as your calcified plaque burden. It does not correspond directly to the percentage   of narrowing in the artery, but does correlate with the severity of the   overall coronary atherosclerotic burden. This score is then used to determine the calcium percentile which compares   your calcified plaque burden to that of other asymptomatic men and women of   the same age. The calcium score, in combination with the percentile, enables   your physician to determine your risk of developing symptomatic coronary   artery disease, and to measure the progression of disease as well as the   effectiveness of treatment. A score of zero indicates that there is no calcified plaque burden. This   implies that there is no significant coronary artery narrowing and very low   likelihood of a cardiac event over at least the next 3 years. It does not   absolutely rule out the presence of soft, noncalcified plaque or totally   eliminate the possibility of a cardiac event. A score greater than zero indicates at least some coronary artery disease. As the score increases, so does the likelihood of a significant coronary   narrowing and the likelihood of a coronary event over the next 3 years. Similarly, the likelihood of a coronary event increases with increasing   calcium percentiles. MRI BRAIN WO CONTRAST   Final Result   Possible acute to subacute infarct within the genu of the corpus callosum, on   the left hand side. Correlate with neurologic exam and strongly advise close interval follow-up   to exclude presence of an underlying neoplasm. The findings were sent to the Radiology Results [address] at 2:51   pm on 3/2/2021to be communicated to a licensed caregiver. CT HEAD WO CONTRAST   Final Result   No acute intracranial abnormality. CTA HEAD NECK W CONTRAST   Final Result   Unremarkable CTA of the head and neck.

## 2021-03-05 NOTE — PROGRESS NOTES
Patient: Shalini Mcgill is a 64 y.o. female who presents today with the following Chief Complaint(s):  Chief Complaint   Patient presents with    Follow-Up from Hospital     Hypertension         HPI-this is a 80-year-old female hospital following up after I sent her out to the hospital last week with hypertensive urgency. She states she just got out of the hospital yesterday and is following up today. She did see cardiology within the hospital and I did recommend that we keep cardiology on board because she states that her blood pressure is up and down. The last reading that I got today on her was 140/80. She currently is on a aspirin 81 mg regimen daily. She also is on lisinopril 20 mg tablet daily, Coreg 12.5 tablet twice daily, Norvasc 10 mg tablet daily. She also is on Lipitor 40 mg at night. She denies no symptoms just a little tired she states from being in the hospital.  She denies any syncope, chest pain, or shortness of breath at this time. 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 She is wanting a note so she can recuperate on next week and see me on that Monday for a return to work letter  She states that she does not want any vaccines that she is flagging for today.   Current Outpatient Medications   Medication Sig Dispense Refill    aspirin 81 MG chewable tablet Take 1 tablet by mouth daily 30 tablet 0    lisinopril (PRINIVIL;ZESTRIL) 20 MG tablet Take 1 tablet by mouth daily 30 tablet 0    carvedilol (COREG) 12.5 MG tablet Take 1 tablet by mouth 2 times daily 60 tablet 0    amLODIPine (NORVASC) 10 MG tablet Take 1 tablet by mouth daily 30 tablet 0    atorvastatin (LIPITOR) 40 MG tablet Take 1 tablet by mouth nightly 30 tablet 0    Acetaminophen-Aspirin Buffered (EXCEDRIN BACK & BODY) 250-250 MG TABS Take by mouth as needed      omeprazole (PRILOSEC) 20 MG capsule Take 20 mg by mouth daily  EPINEPHrine (EPIPEN) 0.3 MG/0.3ML THANG injection Use as directed for allergic reaction (Patient not taking: Reported on 3/1/2021) 2 Device 3     No current facility-administered medications for this visit. Patient's past medical history, surgical history, family history, medications,  and allergies  were all reviewed and updated as appropriate today. Review of Systems   Neurological: Positive for weakness (She states her legs are weak due to being hospitalized). All other systems reviewed and are negative. Physical Exam  Vitals signs and nursing note reviewed. Constitutional:       Appearance: Normal appearance. She is well-developed. HENT:      Head: Normocephalic. Right Ear: Tympanic membrane and external ear normal.      Left Ear: Tympanic membrane and external ear normal.      Nose: Nose normal.      Mouth/Throat:      Mouth: Mucous membranes are moist.      Pharynx: Oropharynx is clear. Eyes:      Conjunctiva/sclera: Conjunctivae normal.      Pupils: Pupils are equal, round, and reactive to light. Neck:      Musculoskeletal: Normal range of motion and neck supple. Thyroid: No thyromegaly. Vascular: No carotid bruit. Cardiovascular:      Rate and Rhythm: Normal rate and regular rhythm. Heart sounds: Normal heart sounds. No murmur. Pulmonary:      Effort: Pulmonary effort is normal.      Breath sounds: Normal breath sounds. No wheezing. Abdominal:      General: Bowel sounds are normal.      Palpations: Abdomen is soft. There is no mass. Musculoskeletal: Normal range of motion. Lymphadenopathy:      Cervical: No cervical adenopathy. Skin:     General: Skin is warm and dry. Neurological:      Mental Status: She is alert and oriented to person, place, and time. Psychiatric:         Mood and Affect: Mood normal.         Behavior: Behavior normal.         Thought Content:  Thought content normal.         Judgment: Judgment normal.       Vitals: 03/05/21 1022   BP: (!) 140/80   Pulse:    Temp:    SpO2:        Assessment:  Encounter Diagnoses   Name Primary?  Uncontrolled hypertension Yes    Hypertensive urgency, malignant        Controlled SubstancesMonitoring:  NA    Plan:  1. Uncontrolled hypertension  Problem  - CO DISCHARGE MEDS RECONCILED W/ CURRENT OUTPATIENT MED LIST  He was instructed to follow-up with cardiology  Continue all cardiac meds as previously ordered by the hospital  She has a blood pressure monitor at home she is instructed to take it twice a day and record this on paper and bring this in at next visit  Note was given for her to return to work on March 15  She will follow-up the morning of March 15    2. Hypertensive urgency, malignant  Problem  - CO DISCHARGE MEDS RECONCILED W/ CURRENT OUTPATIENT MED LIST  Follow-up with cardiology ASAP  Continue all cardiac meds as previously ordered by the hospital      KASHIF Montana    Reviewed treatment plan with patient. Patient verbalized understanding to treatment plan and questions were answered. [facility].  Connie, [address]

## 2021-03-05 NOTE — TELEPHONE ENCOUNTER
[facility] Trans[facility] Initial Follow Up Call    Outreach made within 2 business days of discharge: Yes    Patient: Lavonne Franco Patient : 1960   MRN: <[id number]>  Reason for Admission: There are no discharge diagnoses documented for the most recent discharge.   Discharge Date: 3/4/21       Spoke with: patient had HFU appt with PCP     Discharge department/facility: [facility]    Scheduled appointment with PCP within 7-14 days    Follow Up  Future Appointments   Date Time Provider Yolanda Tapia   3/15/2021  8:00 AM [address] [facility]       Helene Leonard, [address]

## 2021-03-05 NOTE — ADT AUTH CERT
Hypertension - Care Day 3 (3/3/2021) by Zane Wood RN       Review Status Review Entered   Completed 3/5/2021 09:21      Criteria Review      Care Day: 3 Care Date: 3/3/2021 Level of Care: Intermediate Care    Guideline Day 3    Level Of Care    ( ) Floor to discharge    3/5/2021 9:21 AM EST by Chelo Gray      magnesium 1.70    Clinical Status    (X) * Renal function at baseline or stable and acceptable    3/5/2021 9:21 AM EST by Chelo Gray      per Renal:  Screening for hyperaldosteronism, pheochromocytoma, & cushing's syndrome  - Continue lisinopril, coreg, prn hydralazine for BP control  - UA with microscopy to assess for proteinuria    (X) * Pulmonary edema absent or acceptable for next level of care    (X) * Mental status at baseline    (X) * Blood pressure under acceptable control    3/5/2021 9:21 AM EST by Chelo Gray      98. 4  18  79  151/81  96%ra    (X) * No evidence of active cerebral or myocardial ischemia    3/5/2021 9:21 AM EST by Chelo Gray      Stress test today    per Cardio: continue asa and statin    ( ) * Discharge plans and education understood    Activity    (X) * Ambulatory or acceptable for next level of care [F]    Routes    (X) * Oral hydration, medications, and diet    3/5/2021 9:21 AM EST by Chelo Gray      asa 81mg qd    Medications    (X) * Oral antihypertensive regimen established    3/5/2021 9:21 AM EST by Chelo Gray      norvasc 10mg qd  coreg 12.5 mg bid  lisinopril 20mg qd    * Milestone   Additional Notes   3/3   Per Neuro:   Aspirin   Statin   No smoking   Measure monitor blood pressure frequently at home   Follow-up with her new PCP regarding further stroke prevention   Nicotine patch   A1c   Discussed stroke prevention and results of MRI with the patient   Telemetry   DVT and GI prophylaxis   Follow-up MRI brain with contrast in 2 months.  She can call my office or have her PCP ordering such testing.    Continue current blood pressure medications   PT and OT Can be discharged when medically stable

## 2021-03-09 NOTE — TELEPHONE ENCOUNTER
----- Message from Luz Maria Rodriguez sent at 3/9/2021  9:56 AM EST -----  Subject: Message to Provider    QUESTIONS  Information for Provider? Pt stated that PCP told her to call a   cardiologist named Dr. Anel Upton   but she hasn't been able to reach him. Also   the pt stated that she's not sure what PCP wants her to say to Dr. Courtney Salmon. She would like to speak to PCP asap.  ---------------------------------------------------------------------------  --------------  CALL BACK INFO  What is the best way for the office to contact you? Do not leave any   message   patient will call back for answer  Preferred Call Back Phone Number? [phone]  ---------------------------------------------------------------------------  --------------  SCRIPT ANSWERS  Relationship to Patient?  Self

## 2021-03-15 NOTE — LETTER
[address]  Phone: [phone]  Fax: [phone]    RONNELL Quarles - REAGAN        March 15, 2021     Patient: Tushar Kimball   YOB: 1960   Date of Visit: 3/15/2021       To Whom it May Concern:    Chasity Hernandez was seen in my clinic on 3/15/2021. She may return to work on March 15, 2021. If you have any questions or concerns, please don't hesitate to call.     Sincerely,         Aliza Sofia, APRN - CNP

## 2021-03-15 NOTE — PROGRESS NOTES
Patient: Dilcia Lewis is a 64 y.o. female who presents today with the following Chief Complaint(s):  Chief Complaint   Patient presents with    Hypertension     Follow Up         HPI-this is a 28-year-old female patient following up today with her hypertension. We did do orthostatic blood pressures on her standing her blood pressure was 138/74 her pulse was 72 her pulse ox was 98%. Sitting her blood pressure was 142/84, pulse was 73, O2 sat was 98%. Laying down her blood pressure was 156/86, pulse was 70, pulse ox was 98%. She denied no lightheadedness during this time. She is complaining of occasional lightheadedness during the day in which she states she has to sit down but it does go away within a matter of minutes. She states that one time she did take her blood pressure at 1 of these spells and she sat down her blood pressure was 111/50. She denies ever fainting or any syncopal spells. She has been off work since she was admitted with her urgency hypertension crisis. She states that her average blood pressure at home runs around 154/78 with a pulse of 75 average. I did discuss a few of her vaccines she does not want the flu shot I discussed how important it is for the pneumonia shot she does not want either 1 today.   Current Outpatient Medications   Medication Sig Dispense Refill    lisinopril-hydroCHLOROthiazide (PRINZIDE;ZESTORETIC) 20-25 MG per tablet Take 1 tablet by mouth daily 30 tablet 1    aspirin 81 MG chewable tablet Take 1 tablet by mouth daily 30 tablet 0    carvedilol (COREG) 12.5 MG tablet Take 1 tablet by mouth 2 times daily 60 tablet 0    amLODIPine (NORVASC) 10 MG tablet Take 1 tablet by mouth daily 30 tablet 0    atorvastatin (LIPITOR) 40 MG tablet Take 1 tablet by mouth nightly 30 tablet 0    Acetaminophen-Aspirin Buffered (EXCEDRIN BACK & BODY) 250-250 MG TABS Take by mouth as needed      omeprazole (PRILOSEC) 20 MG capsule Take 20 mg by mouth daily      EPINEPHrine (EPIPEN) 0.3 MG/0.3ML THANG injection Use as directed for allergic reaction (Patient not taking: Reported on 3/1/2021) 2 Device 3     No current facility-administered medications for this visit. Patient's past medical history, surgical history, family history, medications,  and allergies  were all reviewed and updated as appropriate today. Review of Systems   Neurological: Positive for light-headedness (a few times). All other systems reviewed and are negative. Physical Exam  Vitals signs and nursing note reviewed. Constitutional:       Appearance: Normal appearance. She is well-developed. HENT:      Head: Normocephalic. Right Ear: External ear normal. There is impacted cerumen. Left Ear: External ear normal. There is impacted cerumen. Ears:      Comments: Normal TMs after irrigation  She does have narrow canals     Nose: Nose normal.      Mouth/Throat:      Mouth: Mucous membranes are moist.      Pharynx: Oropharynx is clear. No oropharyngeal exudate or posterior oropharyngeal erythema. Eyes:      Extraocular Movements: Extraocular movements intact. Conjunctiva/sclera: Conjunctivae normal.      Pupils: Pupils are equal, round, and reactive to light. Neck:      Musculoskeletal: Normal range of motion and neck supple. Thyroid: No thyromegaly. Cardiovascular:      Rate and Rhythm: Normal rate and regular rhythm. Heart sounds: Normal heart sounds. No murmur. Pulmonary:      Effort: Pulmonary effort is normal.      Breath sounds: Normal breath sounds. No wheezing. Abdominal:      General: Bowel sounds are normal.      Palpations: Abdomen is soft. There is no mass. Musculoskeletal: Normal range of motion. Lymphadenopathy:      Cervical: No cervical adenopathy. Skin:     General: Skin is warm and dry. Neurological:      Mental Status: She is alert and oriented to person, place, and time.    Psychiatric:         Mood and Affect: Mood normal. Behavior: Behavior normal.         Thought Content: Thought content normal.         Judgment: Judgment normal.       Vitals:    03/15/21 0753   BP: 138/76   Pulse: 73   Temp: 97.8 °F (36.6 °C)   SpO2: 98%       Assessment:  Encounter Diagnoses   Name Primary?  HTN (hypertension), benign     Conductive hearing loss, bilateral Yes    Bilateral impacted cerumen        Controlled SubstancesMonitoring:  NA    Plan:  1. HTN (hypertension), benign  Unstable at this time I am adding hydrochlorothiazide to her medication regimen  - lisinopril-hydroCHLOROthiazide (PRINZIDE;ZESTORETIC) 20-25 MG per tablet; Take 1 tablet by mouth daily  Dispense: 30 tablet; Refill: 1  Instructed to call the office if the lightheadedness worsens or increases with frequency  Instructed to stay hydrated  Encouraged to continue to quit smoking    2. Conductive hearing loss, bilateral  Problem  - DC REMOVAL IMPACTED CERUMEN IRRIGATION/LVG UNILAT    3. Bilateral impacted cerumen  Problem  - DC REMOVAL IMPACTED CERUMEN IRRIGATION/LVG UNILAT      KASHIF Lawson    Reviewed treatment plan with patient. Patient verbalized understanding to treatment plan and questions were answered. [address] [facility].  Mamie, [address]

## 2021-03-15 NOTE — PROGRESS NOTES
Patient stated she was feeling lightheaded so she checked her blood pressure and it was 111,112. Three times in a row. Other than that 150 range over 78/75. Patient left her sheet on counter but will fax it.

## 2021-03-18 NOTE — PROGRESS NOTES
[address]   CARDIAC EVALUATION NOTE  [phone]      PCP:  RONNELL Lin CNP    Reason for Consultation/Chief Complaint: hospital follow up for CVA    Subjective   History of Present Illness:  Michael Miller is a 64 y.o. patient with a history of CVA and HTN who presents for hospital follow up. She was admitted 03/01-03/04/21 with SBP > 226 and dizziness. She had an episode of loss of consciousness over the weekend and has been noticing elevated blood pressure. She presented to emergency room and was found to have hypertensive urgency, troponin levels were found to be negative. She stated she  had hypertension going back 20 years and it has not been well controlled. She was not on any medications. She denies chest pain or shortness of breath or palpitations. She believed she had a remote cardiology evaluation and was \"a candidate for a pacemaker\" at one time but when she followed up with a cardiologist, she had testing done including a stress test and she was felt to be stable and did not require further intervention. Her work-up in the hospital revealed elevated proBNP level of 484. Potassium level has been low between 2.9 and 3.2. Magnesium has been low at 1.3-1.8. She had a CT scan of the head 03/01/21 which was unremarkable. She also had a CTA of the head and neck 03/01/21 which did not show significant stenosis. There was a little old left caudate nucleus lacunar infarct. Her MRI from 03/02/21 showed possible acute to subacute infarct within the genu of the corpus callosum, on the left had side. Her echo from 03/02/21 showed her EF was 55-60%. Grade I DD. Mild concentric LVH. Her CT calcium score from 03/02/21 was 323. Her stress test from 03/03/21 showed no evidence of stress induced ischemia. Her carotid doppler from 03/03/21 showed <50% stenosis bilaterally. Today she reports blurred vision and dizziness. She needs to sit down to recover.  She reports ongoing headache. She is still smoking but had decreased. She denies CP, palpitations, BLE edema or syncope. Past Medical History:   has a past medical history of Hypertension. Surgical History:   has a past surgical history that includes Hysterectomy and Cholecystectomy. Social History:   reports that she has been smoking cigarettes. She started smoking about 41 years ago. She has a 20.00 pack-year smoking history. She has never used smokeless tobacco. She reports that she does not drink alcohol or use drugs. Family History:  family history is not on file. Home Medications:  Were reviewed and are listed in nursing record and/or below  Prior to Admission medications    Medication Sig Start Date End Date Taking? Authorizing Provider   lisinopril-hydroCHLOROthiazide (PRINZIDE;ZESTORETIC) 20-25 MG per tablet Take 1 tablet by mouth daily 3/15/21  Yes RONNELL Garcia - CNP   aspirin 81 MG chewable tablet Take 1 tablet by mouth daily 3/5/21 4/4/21 Yes Fe Casiano MD   carvedilol (COREG) 12.5 MG tablet Take 1 tablet by mouth 2 times daily 3/4/21 4/3/21 Yes Fe Casiano MD   amLODIPine (NORVASC) 10 MG tablet Take 1 tablet by mouth daily 3/5/21 4/4/21 Yes Fe Casiano MD   atorvastatin (LIPITOR) 40 MG tablet Take 1 tablet by mouth nightly 3/4/21 4/3/21 Yes Fe Casiano MD   Acetaminophen-Aspirin Buffered ([address]) 250-250 MG TABS Take by mouth as needed   Yes Historical Provider, MD   omeprazole (PRILOSEC) 20 MG capsule Take 20 mg by mouth daily   Yes Historical Provider, MD   EPINEPHrine (EPIPEN) 0.3 MG/0.3ML THANG injection Use as directed for allergic reaction  Patient not taking: Reported on 3/19/2021 8/30/12   Thomas Orantes PA-C          Allergies:  Bee pollen and Varenicline     Review of Systems:   A 14 point review of symptoms completed. Pertinent positives identified in the HPI, all other review of symptoms negative as below.       Objective   PHYSICAL EXAM: Vitals:    21 0923   BP: 134/76   Pulse: 83   SpO2: 93%    Weight: 191 lb 8 oz (86.9 kg)         General Appearance:  Alert, cooperative, no distress, appears stated age   Head:  Normocephalic, without obvious abnormality, atraumatic   Eyes:  PERRL, conjunctiva/corneas clear   Nose: Nares normal, no drainage or sinus tenderness   Throat: Lips, mucosa, and tongue normal   Neck: Supple, symmetrical, trachea midline, no adenopathy, thyroid: not enlarged, symmetric, no tenderness/mass/nodules, no carotid bruit or JVD   Lungs:   Clear to auscultation bilaterally, respirations unlabored   Chest Wall:  No deformity or tenderness   Heart:  Regular rate and rhythm, S1, S2 normal, 2/6 systolic murmur, rub or gallop   Abdomen:   Soft, non-tender, bowel sounds active all four quadrants,  no masses, no organomegaly   Extremities: Extremities normal, atraumatic, no cyanosis or edema   Pulses: 2+ and symmetric   Skin: Skin color, texture, turgor normal, no rashes or lesions   Pysch: Normal mood and affect   Neurologic: Normal gross motor and sensory exam.         Labs   CBC:   Lab Results   Component Value Date    WBC 9.2 2021    RBC 4.62 2021    HGB 13.4 2021    HCT 39.8 2021    MCV 86.0 2021    RDW 13.9 2021     2021     CMP:  Lab Results   Component Value Date     2021    K 3.8 2021    K 3.2 2021     2021    CO2 30 2021    BUN 12 2021    CREATININE 0.7 2021    GFRAA >60 2021    AGRATIO 1.3 2021    LABGLOM >60 2021    GLUCOSE 114 2021    PROT 6.7 2021    CALCIUM 9.4 2021    BILITOT 0.9 2021    ALKPHOS 87 2021    AST 9 2021    ALT <5 2021     PT/INR:  No results found for: PTINR  HgBA1c:  Lab Results   Component Value Date    LABA1C 5.9 2020     Lab Results   Component Value Date    TROPONINI <0.01 2021         Cardiac Data     Last EK21   SR unspecified mechanism ([facility] [address]    2. HTN (hypertension), benign    3. Hypertensive urgency    4. SOB (shortness of breath)    5. Dizziness                 Plan   1. Liver and lipids  2. 2 week cardiac monitor for stroke  3. Follow up in 2 months with NP    Discussed with patient that lightheadedness may be side effect from normalization of blood pressures, continue to monitor this, expect that this may be somewhat of a chronic issue, described maneuvers and measures to manage this. Scribe's attestation: This note was scribed in the presence of Dr. Radha Gupta by Amanda Spencer RN       Thank you for allowing us to participate in the care of Lavonne Franco. Please call me with any questions [phone]. Radha Gupta MD, [facility]   Interventional Cardiologist  [facility]  [phone] [facility]  [phone] [address] [place] [address]  3/19/2021 9:51 AM    I will address the patient's cardiac risk factors and adjusted pharmacologic treatment as needed. In addition, I have reinforced the need for patient directed risk factor modification. Tobacco use was discussed with the patient and educated on the negative effects and was asked not to use. All questions and concerns were addressed to the patient/family. Alternatives to my treatment were discussed. I, Dr Radha Gupta, personally performed the services described in this documentation, as scribed by the above signed scribe in my presence. It is both accurate and complete to my knowledge. I agree with the details independently gathered by the clinical support staff and the scribed note accurately describes my personal service to the patient.

## 2021-03-19 PROBLEM — R06.02 SOB (SHORTNESS OF BREATH): Status: ACTIVE | Noted: 2021-01-01

## 2021-03-19 PROBLEM — R42 DIZZINESS: Status: ACTIVE | Noted: 2021-01-01

## 2021-03-19 NOTE — TELEPHONE ENCOUNTER
It would be okay to write a letter indicating that she should not be climbing on ladders due to history of stroke. Thank you.

## 2021-03-19 NOTE — TELEPHONE ENCOUNTER
Pt sts at work for the next 2 weeks she is working on ladders. Pt is not comfortable working up on ladders with 2 week cardiac monitor for stroke. Pt requesting a letter to be relieve her of work/ladders. Please advise.

## 2021-03-19 NOTE — TELEPHONE ENCOUNTER
Monitor placed by Maribel Flores, [address] [place] [address]  Length of monitor 2 week   Monitor ordered by [facility]   Monitor Number [id number]    Activation successful prior to pt leaving office?  Yes

## 2021-03-23 NOTE — TELEPHONE ENCOUNTER
The patient called to state her blood pressure has been running 127/71 and after she takes her BP medication it goes down to 94/61 the patient is also getting dizzy and light headed with blurry vision.      Please advise     [phone]

## 2021-03-23 NOTE — TELEPHONE ENCOUNTER
Patient informed. She will take the lisinopril instead. Also, patient will be home for 2 weeks due to the cardiologist putting her on a heart monitor and her not being able to perform her job duties while on the monitor. Patient will call back with update.

## 2021-03-30 NOTE — TELEPHONE ENCOUNTER
LOV 3.15.2021    Future visit 4.26.2021    The patient would like to refill carvedilol (COREG) 12.5 MG tablet    atorvastatin (LIPITOR) 40 MG tablet    amLODIPine (NORVASC) 10 MG tablet    aspirin 81 MG chewable tablet    Send to [facility]ba on [address] [facility]

## 2021-04-12 NOTE — TELEPHONE ENCOUNTER
Spoke to pt. Per DR Rod Kohler, cardiac monitor showed early Am marisol, 2nd degree AVB. Refer to EP. EP appt for 06/24 with DR Vasquez Vega.  VU

## 2021-04-26 NOTE — PROGRESS NOTES
Patient: Quintin Samaniego is a 64 y.o. female who presents today with the following Chief Complaint(s):  Chief Complaint   Patient presents with    Hypertension     6 week follow up         HPI-this is a 58-year-old female patient following up today with her hypertension. She is currently involved with cardiology and they are following up with her. She is currently taking amlodipine 10 mg tablet daily, aspirin 81 mg tablet daily, Lipitor 40 mg tablet daily for the hyperlipidemia, Coreg 12.5 mg twice daily. She denies no side effects from the medication and is doing well. Her blood pressure is controlled. She still complains of lightheadedness and dizziness at times. She states that it is worse in the morning and gets better throughout the day. I did explain to her to make cardiology aware of this. She does have a second-degree heart block. She has active orders within epic but she stated she completed these through [employer]. She is still smoking she states a few in the morning and after dinner but she has cut down tremendously she states. Encouragement was given to continue to cut back and eventually quit  She also has a history of GERD in which she takes omeprazole 20 mg tablet daily. She denies no side effects from the medication and states how wonderful the medication works.   She will need a refill of all of her medications today    Current Outpatient Medications   Medication Sig Dispense Refill    amLODIPine (NORVASC) 10 MG tablet Take 1 tablet by mouth daily 90 tablet 1    aspirin 81 MG chewable tablet Take 1 tablet by mouth daily 90 tablet 1    atorvastatin (LIPITOR) 40 MG tablet Take 1 tablet by mouth nightly 90 tablet 1    carvedilol (COREG) 12.5 MG tablet Take 1 tablet by mouth 2 times daily 180 tablet 1    omeprazole (PRILOSEC) 20 MG delayed release capsule Take 1 capsule by mouth daily 90 capsule 1    Acetaminophen-Aspirin Buffered (EXCEDRIN BACK & BODY) 250-250 MG TABS Take by mouth as needed       No current facility-administered medications for this visit. Patient's past medical history, surgical history, family history, medications,  and allergies  were all reviewed and updated as appropriate today. Review of Systems   Neurological: Positive for dizziness (Occasionally), light-headedness (Occasionally mostly in the morning) and headaches (Intermittent). All other systems reviewed and are negative. Physical Exam  Vitals signs and nursing note reviewed. Constitutional:       Appearance: Normal appearance. She is well-developed. HENT:      Head: Normocephalic. Right Ear: Tympanic membrane and external ear normal.      Left Ear: Tympanic membrane and external ear normal.      Nose: Nose normal.      Mouth/Throat:      Mouth: Mucous membranes are moist.      Pharynx: Oropharynx is clear. No oropharyngeal exudate or posterior oropharyngeal erythema. Eyes:      Conjunctiva/sclera: Conjunctivae normal.      Pupils: Pupils are equal, round, and reactive to light. Neck:      Musculoskeletal: Normal range of motion and neck supple. Thyroid: No thyromegaly. Cardiovascular:      Rate and Rhythm: Normal rate and regular rhythm. Heart sounds: Normal heart sounds. No murmur. Pulmonary:      Effort: Pulmonary effort is normal.      Breath sounds: Normal breath sounds. No wheezing. Abdominal:      General: Bowel sounds are normal.      Palpations: Abdomen is soft. There is no mass. Musculoskeletal: Normal range of motion. Lymphadenopathy:      Cervical: No cervical adenopathy. Skin:     General: Skin is warm and dry. Neurological:      Mental Status: She is alert and oriented to person, place, and time. Psychiatric:         Mood and Affect: Mood normal.         Behavior: Behavior normal.         Thought Content:  Thought content normal.         Judgment: Judgment normal.       Vitals:    04/26/21 0743   BP: 136/70   Pulse: 70   SpO2: 98%       Assessment: Encounter Diagnoses   Name Primary?  HTN (hypertension), benign Yes    Dyslipidemia     Tobacco abuse     Gastroesophageal reflux disease without esophagitis        Controlled SubstancesMonitoring:  NA    Plan:  1. HTN (hypertension), benign  Stable  - amLODIPine (NORVASC) 10 MG tablet; Take 1 tablet by mouth daily  Dispense: 90 tablet; Refill: 1  - aspirin 81 MG chewable tablet; Take 1 tablet by mouth daily  Dispense: 90 tablet; Refill: 1  - carvedilol (COREG) 12.5 MG tablet; Take 1 tablet by mouth 2 times daily  Dispense: 180 tablet; Refill: 1    2. Dyslipidemia  Stable  - atorvastatin (LIPITOR) 40 MG tablet; Take 1 tablet by mouth nightly  Dispense: 90 tablet; Refill: 1    3. Tobacco abuse  Problem  Encouraged to continue to cut back and eventually quit    4. Gastroesophageal reflux disease without esophagitis  Stable  - omeprazole (PRILOSEC) 20 MG delayed release capsule; Take 1 capsule by mouth daily  Dispense: 90 capsule; Refill: 1      Loyce Mode, FNP    Reviewed treatment plan with patient. Patient verbalized understanding to treatment plan and questions were answered. [address] [facility] [address]  [place], [address]

## 2021-05-06 NOTE — TELEPHONE ENCOUNTER
Reason for Disposition   [1] MODERATE dizziness (e.g., interferes with normal activities) AND [2] has NOT been evaluated by physician for this  (Exception: dizziness caused by heat exposure, sudden standing, or poor fluid intake)    Answer Assessment - Initial Assessment Questions  1. DESCRIPTION: \"Describe your dizziness. \"      Room spinning and afraid she's gonna fall when present. It is \"in my eyesight too\" - looked like rug was moving when she was vacuuming . It comes and goes. Describes as \"lightheadedness\". 2. LIGHTHEADED: \"Do you feel lightheaded? \" (e.g., somewhat faint, woozy, weak upon standing)      See above    3. VERTIGO: \"Do you feel like either you or the room is spinning or tilting? \" (i.e. vertigo)      Room spinning     4. SEVERITY: \"How bad is it? \"  \"Do you feel like you are going to faint? \" \"Can you stand and walk? \"    - MILD - walking normally    - MODERATE - interferes with normal activities (e.g., work, school)     - SEVERE - unable to stand, requires support to walk, feels like passing out now. When present, it is moderate - she feels the need to grab onto something. Not present currently. 5. ONSET:  \"When did the dizziness begin? \"      Since stroke march 3, 2021 but getting more frequent    6. AGGRAVATING FACTORS: \"Does anything make it worse? \" (e.g., standing, change in head position)      Up and walking around, makes it worse    7. HEART RATE: \"Can you tell me your heart rate? \" \"How many beats in 15 seconds? \"  (Note: not all patients can do this)        *No Answer*  8. CAUSE: \"What do you think is causing the dizziness? \"      Had stroke march 3, 2021. H/o HTN. 9. RECURRENT SYMPTOM: \"Have you had dizziness before? \" If so, ask: \"When was the last time? \" \"What happened that time? \"      Never had this feeling prior to stroke. 10. OTHER SYMPTOMS: \"Do you have any other symptoms? \" (e.g., fever, chest pain, vomiting, diarrhea, bleeding)        Sob at times , but \"not a lot\" and it doesn't last per pt. Blurred vision comes and goes - it only lasts maybe a minute and it also started since having the stroke. Denies other symptoms. States she feels \"fine\" right now and has no symptoms. 11. PREGNANCY: \"Is there any chance you are pregnant? \" \"When was your last menstrual period? \"        Not asked    Protocols used: DIZZINESS - LIGHTHEADEDNESS-ADULT-AH    Received call from [place] at pre-service center [facility]) [place] with Red Flag Complaint. Brief description of triage: see above    Triage indicates for patient to be seen in 24 hours - pt informed to go to  , if no available apt as recommended. Care advice provided, patient verbalizes understanding; denies any other questions or concerns; instructed to call back for any new or worsening symptoms. Writer provided warm transfer to [place] at [facility] for appointment scheduling. Attention Provider: Thank you for allowing me to participate in the care of your patient. The patient was connected to triage in response to information provided to the [facility]. Please do not respond through this encounter as the response is not directed to a shared pool.

## 2021-05-06 NOTE — TELEPHONE ENCOUNTER
----- Message from [facility] ARMIJO sent at 5/6/2021  5:34 PM EDT -----  Subject: Message to Provider    QUESTIONS  Information for Provider? She is having dizzy spells when she is up and   walking around and it is causing foggy vision and causing problems. ---------------------------------------------------------------------------  --------------  [place] INFO  What is the best way for the office to contact you? OK to leave message on   voicemail  Preferred Call Back Phone Number? [phone]  ---------------------------------------------------------------------------  --------------  SCRIPT ANSWERS  Relationship to Patient?  Self

## 2021-05-07 NOTE — PROGRESS NOTES
Patient: Latoya Rose is a 64 y.o. female who presents today with the following Chief Complaint(s):  Chief Complaint   Patient presents with    Dizziness         HPI-this is a 22-year-old female patient with complaints of lightheadedness and dizziness. She states that is more lightheadedness than dizziness. She denies no syncopal spells. She currently is seeing cardiology and is being followed up by them. I explained to her this is probably heart related that she needs to follow-up with them but that I can try and give her a medication to help possibly with the dizziness and lightheadedness. She has a history of CVA in which she is noticing that her memory is being affected. She states that she is forgetting to clock in at work and passes right by the clock and machine. She is also stating that when driving she gets disoriented where she is at. She is still smoking she admits to having 1 in the morning 1 at night and a few in between. Encouragement was given to continue to cut back and eventually quit. She verbalized understanding of this  Current Outpatient Medications   Medication Sig Dispense Refill    meclizine (ANTIVERT) 25 MG tablet Take 1 tablet by mouth 3 times daily as needed for Dizziness (PRN) 30 tablet 0    amLODIPine (NORVASC) 10 MG tablet Take 1 tablet by mouth daily 90 tablet 1    aspirin 81 MG chewable tablet Take 1 tablet by mouth daily 90 tablet 1    atorvastatin (LIPITOR) 40 MG tablet Take 1 tablet by mouth nightly 90 tablet 1    carvedilol (COREG) 12.5 MG tablet Take 1 tablet by mouth 2 times daily 180 tablet 1    omeprazole (PRILOSEC) 20 MG delayed release capsule Take 1 capsule by mouth daily 90 capsule 1    Acetaminophen-Aspirin Buffered (EXCEDRIN BACK & BODY) 250-250 MG TABS Take by mouth as needed       No current facility-administered medications for this visit.         Patient's past medical history, surgical history, family history, medications,  and allergies  were all reviewed and updated as appropriate today. Review of Systems   Neurological: Positive for dizziness and light-headedness. All other systems reviewed and are negative. Physical Exam  Vitals signs and nursing note reviewed. Constitutional:       Appearance: Normal appearance. She is well-developed. HENT:      Head: Normocephalic. Right Ear: External ear normal.      Left Ear: External ear normal.      Nose: Nose normal.      Mouth/Throat:      Mouth: Mucous membranes are moist.   Eyes:      Conjunctiva/sclera: Conjunctivae normal.      Pupils: Pupils are equal, round, and reactive to light. Neck:      Musculoskeletal: Normal range of motion and neck supple. Thyroid: No thyromegaly. Cardiovascular:      Rate and Rhythm: Normal rate and regular rhythm. Heart sounds: Normal heart sounds. No murmur. Pulmonary:      Effort: Pulmonary effort is normal.      Breath sounds: Normal breath sounds. No wheezing. Abdominal:      General: Bowel sounds are normal.      Palpations: Abdomen is soft. There is no mass. Musculoskeletal: Normal range of motion. Lymphadenopathy:      Cervical: No cervical adenopathy. Skin:     General: Skin is warm and dry. Neurological:      Mental Status: She is alert and oriented to person, place, and time. Psychiatric:         Mood and Affect: Mood normal.         Behavior: Behavior normal.         Thought Content: Thought content normal.         Judgment: Judgment normal.       Vitals:    05/07/21 0827   BP: 138/80   Pulse: 75   Temp: 97.5 °F (36.4 °C)   SpO2: 98%       Assessment:  Encounter Diagnoses   Name Primary?  Dizziness Yes    Cerebrovascular accident (CVA), unspecified mechanism ([facility] [address]     Memory difficulty     Tobacco abuse     Encounter for screening for malignant neoplasm of breast, unspecified screening modality        Controlled SubstancesMonitoring:  NA    Plan:  1. Dizziness  Problem  - meclizine (ANTIVERT) 25 MG tablet;  Take 1 tablet by mouth 3 times daily as needed for Dizziness (PRN)  Dispense: 30 tablet; Refill: 0  She was encouraged to follow-up with cardiology that I do think this is more cardiac in nature    2. Cerebrovascular accident (CVA), unspecified mechanism ([facility] [address]  Ovi Merino MD, Neurology, [facility]    3. Memory difficulty  Problem  - AFL - Taylor Romo MD, Neurology, [facility]    4. Tobacco abuse  Problem  Encouraged to cut back and eventually quit smoking due to the above heart issues    5. Encounter for screening for malignant neoplasm of breast, unspecified screening modality  - [facility] DIGITAL SCREEN W OR WO CAD BILATERAL; Future      Maggie KASHIF Liu    Reviewed treatment plan with patient. Patient verbalized understanding to treatment plan and questions were answered. [address].  [place], [address]

## 2021-05-21 NOTE — PROGRESS NOTES
[address]   Cardiac Evaluation    Primary Care Doctor: RONNELL Nava CNP    Chief Complaint   Patient presents with    Follow-up     2 mo    Results    Dizziness        History of Present Illness:   I had the pleasure of seeing Nedra Preciado in follow up for hypertension, history of CVA, dizziness and blurred vision as well as continued smoker. She last saw Dr. Stormy Rosenberg in March 2021 at which time a 2-week event monitor was completed that showed bradycardia along with second-degree AV block. She has been referred to EP for further evaluation. She works 2nd shift, 3-11,  at "[employer]. Sleeps later. She was not working while wearing the monitor. Denies palpitations but has some dizziness. Mostly with getting up to walk. Does not occur every day. Sleep is poor, just tosses and turns, trouble getting to sleep. BP at home stays 120-140/ 70's. Took meds late today. Nedra Preciado describes symptoms including fatigue, dizziness but denies chest pain, dyspnea, palpitations, orthopnea, PND, early saiety, edema, syncope. NYHA:   II  ACC/ AHA Stage:    C    Past Medical History:   has a past medical history of Hypertension. Surgical History:   has a past surgical history that includes Hysterectomy and Cholecystectomy. Social History:   reports that she has been smoking cigarettes. She started smoking about 41 years ago. She has a 20.00 pack-year smoking history. She has never used smokeless tobacco. She reports that she does not drink alcohol and does not use drugs. Family History:   No family history on file. Home Medications:  Prior to Admission medications    Medication Sig Start Date End Date Taking?  Authorizing Provider   amLODIPine (NORVASC) 10 MG tablet Take 1 tablet by mouth daily 4/26/21 5/26/21 Yes RONNELL Nava CNP   aspirin 81 MG chewable tablet Take 1 tablet by mouth daily 4/26/21 5/26/21 Yes RONNELL Nava CNP atorvastatin (LIPITOR) 40 MG tablet Take 1 tablet by mouth nightly 4/26/21 5/26/21 Yes RONNELL Patricio CNP   carvedilol (COREG) 12.5 MG tablet Take 1 tablet by mouth 2 times daily 4/26/21 5/26/21 Yes RONNELL Patricio CNP   omeprazole (PRILOSEC) 20 MG delayed release capsule Take 1 capsule by mouth daily 4/26/21  Yes RONNELL Patricio CNP   Acetaminophen-Aspirin Buffered ([address]) 250-250 MG TABS Take by mouth as needed   Yes Historical Provider, MD        Allergies:  Bee pollen and Varenicline     Physical Examination:    Vitals:    05/21/21 0959 05/21/21 1004   BP: (!) 152/78 (!) 152/78   Pulse: 74 74   SpO2: 98% 98%   Weight: 187 lb (84.8 kg) 187 lb (84.8 kg)   Height: 5' 4\" (1.626 m) 5' 4\" (1.626 m)      Constitutional and General Appearance: Warm and dry, no apparent distress, normal coloration  HEENT:  Normocephalic, atraumatic  Respiratory:  · Normal excursion and expansion without use of accessory muscles  · Resp Auscultation: Clear to auscultation   Cardiovascular:  · The apical impulses not displaced  · Heart tones are crisp and normal  · JVP 8 cm H2O  · Regular rate and rhythm, normal S1S2, no m/g/r  · Peripheral pulses are symmetrical and full  · There is no clubbing, cyanosis of the extremities.   · No BLE edema  · Pedal Pulses: 2+ and equal   Abdomen:  · No masses or tenderness  · Liver/Spleen: No Abnormalities Noted  Neurological/Psychiatric:  · Alert and oriented in all spheres  · Moves all extremities well  · Exhibits normal gait balance and coordination  · No abnormalities of mood, affect, memory, mentation, or behavior are noted    Lab Data:  Most recent lab results below reviewed in office    CBC:   Lab Results   Component Value Date    WBC 9.2 03/03/2021    WBC 10.2 03/02/2021    WBC 10.6 03/01/2021    RBC 4.62 03/03/2021    RBC 4.67 03/02/2021    RBC 5.03 03/01/2021    HGB 13.4 03/03/2021    HGB 13.2 03/02/2021    HGB 14.7 03/01/2021    HCT 39.8 2021    HCT 39.4 2021    HCT 42.7 2021    MCV 86.0 2021    MCV 84.2 2021    MCV 85.0 2021    RDW 13.9 2021    RDW 13.7 2021    RDW 13.8 2021     2021     2021     2021     BMP:  Lab Results   Component Value Date     2021     2021     2021    K 3.8 2021    K 3.5 2021    K 3.2 2021    K 2.9 2021     2021     2021    CL 99 2021    CO2 30 2021    CO2 27 2021    CO2 31 2021    PHOS 3.6 2021    PHOS 3.6 2021    BUN 12 2021    BUN 11 2021    BUN 8 2021    CREATININE 0.7 2021    CREATININE 0.7 2021    CREATININE 0.7 2021     BNP:   Lab Results   Component Value Date    PROBNP 484 2021     LIPID:   Lab Results   Component Value Date    TRIG 119 2021    TRIG 212 2020    HDL 36 2021    HDL 35 2020    LDLCALC 117 2021    LDLCALC 124 2020       Cardiac Imaging:  Cardiac ct calcium 3/2/21  FINDINGS:   LEFT MAIN: Zero (0). RIGHT CORONARY ARTERY: Zero (0). LEFT ANTERIOR DESCENDIN       CIRCUMFLEX: Zero (0). TOTAL AGATSTON CALCIUM SCORE: 087      HAQCQIMY 3/3/21       Summary        The bilateral internal carotid arteries reveal <50% diameter reducing    stenosis. The bilateral vertebral arteries have normal antegrade flow. Echo 3/2/21   Summary   Technically difficult examination. Normal left ventricular systolic function with ejection fraction of 55-60%. No regional wall motion abnormalites are seen. Mild concentric left ventricular hypertrophy. Grade I diastolic dysfunction with normal filling pressure. STress testing 3/3/21       Summary    There is no definite evidence of stress induced ischemia. Inhomogeneous    tracer activity noted.  There is tracer activity in small bowel adjacent to    inferior

## 2021-05-21 NOTE — PATIENT INSTRUCTIONS
1. Referral to sleep medicine for evaluation of possible sleep apnea  2. No change in heart medicines  3.  Keep appointment with Dr. Forestine Sacks - will decide if need to keep depending on results of sleep evaluation

## 2021-05-21 NOTE — LETTER
[facility], [place], operated by [facility]   Cardiac Evaluation    Primary Care Doctor: RONNELL Del Cid CNP    Chief Complaint   Patient presents with    Follow-up     2 mo    Results    Dizziness        History of Present Illness:   I had the pleasure of seeing Percy Adams in follow up for hypertension, history of CVA, dizziness and blurred vision as well as continued smoker. She last saw Dr. Madeline Laam in March 2021 at which time a 2-week event monitor was completed that showed bradycardia along with second-degree AV block. She has been referred to EP for further evaluation. She works 2nd shift, 3-11,  at [employer]. Sleeps later. She was not working while wearing the monitor. Denies palpitations but has some dizziness. Mostly with getting up to walk. Does not occur every day. Sleep is poor, just tosses and turns, trouble getting to sleep. BP at home stays 120-140/ 70's. Took meds late today. Percy Adams describes symptoms including fatigue, dizziness but denies chest pain, dyspnea, palpitations, orthopnea, PND, early saiety, edema, syncope. NYHA:   II  ACC/ AHA Stage:    C    Past Medical History:   has a past medical history of Hypertension. Surgical History:   has a past surgical history that includes Hysterectomy and Cholecystectomy. Social History:   reports that she has been smoking cigarettes. She started smoking about 41 years ago. She has a 20.00 pack-year smoking history. She has never used smokeless tobacco. She reports that she does not drink alcohol and does not use drugs. Family History:   No family history on file. Home Medications:  Prior to Admission medications    Medication Sig Start Date End Date Taking?  Authorizing Provider   amLODIPine (NORVASC) 10 MG tablet Take 1 tablet by mouth daily 4/26/21 5/26/21 Yes RONNELL Del Cid CNP   aspirin 81 MG chewable tablet Take 1 tablet by mouth daily 4/26/21 5/26/21 Yes RONNELL Delgadillo CNP   atorvastatin (LIPITOR) 40 MG tablet Take 1 tablet by mouth nightly 4/26/21 5/26/21 Yes Dayana Moncada SHAQUILLE SofiaN - CNP   carvedilol (COREG) 12.5 MG tablet Take 1 tablet by mouth 2 times daily 4/26/21 5/26/21 Yes Mercedjose Moncada RONNELL Sofia - CNP   omeprazole (PRILOSEC) 20 MG delayed release capsule Take 1 capsule by mouth daily 4/26/21  Yes Mercedjose Moncada RONNELL Sofia - CNP   Acetaminophen-Aspirin Buffered ([address]) 250-250 MG TABS Take by mouth as needed   Yes Historical Provider, MD        Allergies:  Bee pollen and Varenicline     Physical Examination:    Vitals:    05/21/21 0959 05/21/21 1004   BP: (!) 152/78 (!) 152/78   Pulse: 74 74   SpO2: 98% 98%   Weight: 187 lb (84.8 kg) 187 lb (84.8 kg)   Height: 5' 4\" (1.626 m) 5' 4\" (1.626 m)      Constitutional and General Appearance: Warm and dry, no apparent distress, normal coloration  HEENT:  Normocephalic, atraumatic  Respiratory:  · Normal excursion and expansion without use of accessory muscles  · Resp Auscultation: Clear to auscultation   Cardiovascular:  · The apical impulses not displaced  · Heart tones are crisp and normal  · JVP 8 cm H2O  · Regular rate and rhythm, normal S1S2, no m/g/r  · Peripheral pulses are symmetrical and full  · There is no clubbing, cyanosis of the extremities.   · No BLE edema  · Pedal Pulses: 2+ and equal   Abdomen:  · No masses or tenderness  · Liver/Spleen: No Abnormalities Noted  Neurological/Psychiatric:  · Alert and oriented in all spheres  · Moves all extremities well  · Exhibits normal gait balance and coordination  · No abnormalities of mood, affect, memory, mentation, or behavior are noted    Lab Data:  Most recent lab results below reviewed in office    CBC:   Lab Results   Component Value Date    WBC 9.2 03/03/2021    WBC 10.2 03/02/2021    WBC 10.6 03/01/2021    RBC 4.62 03/03/2021    RBC 4.67 03/02/2021    RBC 5.03 03/01/2021    HGB 13.4 03/03/2021    HGB 13.2 03/02/2021    HGB 14.7 03/01/2021    HCT 39.8 2021    HCT 39.4 2021    HCT 42.7 2021    MCV 86.0 2021    MCV 84.2 2021    MCV 85.0 2021    RDW 13.9 2021    RDW 13.7 2021    RDW 13.8 2021     2021     2021     2021     BMP:  Lab Results   Component Value Date     2021     2021     2021    K 3.8 2021    K 3.5 2021    K 3.2 2021    K 2.9 2021     2021     2021    CL 99 2021    CO2 30 2021    CO2 27 2021    CO2 31 2021    PHOS 3.6 2021    PHOS 3.6 2021    BUN 12 2021    BUN 11 2021    BUN 8 2021    CREATININE 0.7 2021    CREATININE 0.7 2021    CREATININE 0.7 2021     BNP:   Lab Results   Component Value Date    PROBNP 484 2021     LIPID:   Lab Results   Component Value Date    TRIG 119 2021    TRIG 212 2020    HDL 36 2021    HDL 35 2020    LDLCALC 117 2021    LDLCALC 124 2020       Cardiac Imaging:  Cardiac ct calcium 3/2/21  FINDINGS:   LEFT MAIN: Zero (0). RIGHT CORONARY ARTERY: Zero (0). LEFT ANTERIOR DESCENDIN       CIRCUMFLEX: Zero (0). TOTAL AGATSTON CALCIUM SCORE: 469      AVQUGOOS 3/3/21       Summary        The bilateral internal carotid arteries reveal <50% diameter reducing    stenosis. The bilateral vertebral arteries have normal antegrade flow. Echo 3/2/21   Summary   Technically difficult examination. Normal left ventricular systolic function with ejection fraction of 55-60%. No regional wall motion abnormalites are seen. Mild concentric left ventricular hypertrophy. Grade I diastolic dysfunction with normal filling pressure. STress testing 3/3/21       Summary    There is no definite evidence of stress induced ischemia. Inhomogeneous    tracer activity noted.  There is tracer activity in small bowel adjacent to    inferior wall which may affect interpretation. LV function is normal with    uniform wall motion and ejection fraction of 69 %. Lower risk study. MRI brain 3/2/21  FINDINGS:   INTRACRANIAL STRUCTURES/VENTRICLES: There is mild chronic white matter   microvascular ischemic disease characterized by periventricular white matter   signal abnormality. A more confluent area of signal abnormality is seen   within the corpus callosum, on the left hand side, abutting the frontal horn   of the left lateral ventricle and measuring approximately 1.8 cm in diameter. This is lesion appears to demonstrate partial diffusion restriction and could   reflect an acute to subacute infarct, although infarcts in the corpus   callosum are unusual.       Chronic lacunar infarcts are noted within the basal ganglia bilaterally. Normal expected signal voids are seen within the vessels at the base of   skull. No bleed or shift is identified. ORBITS: The visualized portion of the orbits demonstrate no acute abnormality. SINUSES: The visualized paranasal sinuses and mastoid air cells are well   aerated. BONES/SOFT TISSUES: A midline cystic mass is seen within the nasopharynx   measuring up to 2.2 cm in diameter, consistent with a Tornwaldt cyst.       Assessment:    1. HTN (hypertension), benign    2. Mixed hyperlipidemia    3. Cerebrovascular accident (CVA), unspecified mechanism ([address]    4. Dizziness    5. Hypokalemia    6. Hypomagnesemia    7. Smoker          Plan:   1. Referral to sleep medicine for evaluation of possible sleep apnea  2. No change in heart medicines  3.  Keep appointment with Dr. Nadine Blackwell - will decide if need to keep depending on results of sleep evaluation        I appreciate the opportunity of cooperating in the care of this individual.    Colletta Roads, RONNELL - CNP, 5/21/2021, 10:18 AM

## 2021-10-02 PROBLEM — I63.9 ACUTE ISCHEMIC STROKE (HCC): Status: ACTIVE | Noted: 2021-01-01

## 2021-10-02 NOTE — ED PROVIDER NOTES
[address] [facility]  ED  EMERGENCY DEPARTMENT ENCOUNTER        Pt Name: Rut Brody  MRN: [id number]  Tracigfarturo 1960  Date of evaluation: 10/2/2021  Provider: Milla Darden MD  PCP: RONNELL Ventura - CNP      CHIEF COMPLAINT       Chief Complaint   Patient presents with    Eye Problem     loss of vision in left side; thinks it started an hour ago; has had headache the last 2 days; some numbness last night; only complaint is vision loss on left side       HISTORY OFPRESENT ILLNESS   (Location/Symptom, Timing/Onset, Context/Setting, Quality, Duration, Modifying Factors,Severity)  Note limiting factors. Rut Brody is a 64 y.o. female presenting today due to concern for significant trouble with vision changes when looking to the left that she believes started roughly 1 hour ago. She came to the ED by her brother. She had a prior stroke in March of this year. She reportedly had some numbness of the left hand that resolved but that occurred last night. She denies any current weakness in the arms or legs. She is still able to ambulate. She denies any chest pain or shortness of breath. She does complain of having a headache since yesterday. No reported falls or trauma. Due to trouble with vision on the left side, she came to the ED for further evaluation. REVIEW OF SYSTEMS    (2-9 systems for level 4, 10 or more for level 5)     Review of Systems   Constitutional: Negative for chills, diaphoresis, fatigue and fever. HENT: Negative for congestion. Eyes: Positive for visual disturbance. Negative for photophobia, pain and redness. Respiratory: Negative for shortness of breath. Cardiovascular: Negative for chest pain. Gastrointestinal: Negative for abdominal pain and vomiting. Genitourinary: Negative for flank pain. Musculoskeletal: Negative for back pain and neck pain. Skin: Negative for color change.    Neurological: Positive for numbness (left hand yesterday, normal now per patient) and headaches (x2 days). Negative for syncope, facial asymmetry, weakness and light-headedness. Psychiatric/Behavioral: Negative for confusion. Positives and Pertinent negatives as per HPI. PASTMEDICAL HISTORY     Past Medical History:   Diagnosis Date    Hypertension          SURGICAL HISTORY       Past Surgical History:   Procedure Laterality Date    CHOLECYSTECTOMY      HYSTERECTOMY           CURRENT MEDICATIONS       Previous Medications    AMLODIPINE (NORVASC) 10 MG TABLET    Take 1 tablet by mouth daily    ASPIRIN 81 MG CHEWABLE TABLET    Take 1 tablet by mouth daily    ATORVASTATIN (LIPITOR) 40 MG TABLET    Take 1 tablet by mouth nightly    CARVEDILOL (COREG) 12.5 MG TABLET    Take 1 tablet by mouth 2 times daily    OMEPRAZOLE (PRILOSEC) 20 MG DELAYED RELEASE CAPSULE    Take 1 capsule by mouth daily       ALLERGIES     Bee pollen, Lisinopril, and Varenicline    FAMILY HISTORY     History reviewed. No pertinent family history.        SOCIAL HISTORY       Social History     Socioeconomic History    Marital status: Single     Spouse name: None    Number of children: None    Years of education: None    Highest education level: None   Occupational History    None   Tobacco Use    Smoking status: Current Some Day Smoker     Packs/day: 0.50     Years: 40.00     Pack years: 20.00     Types: Cigarettes     Start date: 36    Smokeless tobacco: Never Used    Tobacco comment: only have smoked a couple cig since hospital   Substance and Sexual Activity    Alcohol use: No    Drug use: No    Sexual activity: None   Other Topics Concern    None   Social History Narrative    None     Social Determinants of Health     Financial Resource Strain:     Difficulty of Paying Living Expenses:    Food Insecurity:     Worried About Running Out of Food in the Last Year:     Ran Out of Food in the Last Year:    Transportation Needs:     Lack of Transportation Eyes:      General: Lids are normal. Visual field deficit (left upper and left lower eye fields, normal visual fields when looking to right) present. No scleral icterus. Right eye: No discharge. Left eye: No discharge. Extraocular Movements: Extraocular movements intact. Right eye: Normal extraocular motion and no nystagmus. Left eye: Normal extraocular motion and no nystagmus. Conjunctiva/sclera: Conjunctivae normal.      Pupils: Pupils are equal, round, and reactive to light. Pupils are equal.      Right eye: Pupil is round, reactive and not sluggish. Left eye: Pupil is round, reactive and not sluggish. Slit lamp exam:     Right eye: No photophobia. Left eye: No photophobia. Visual Fields:      Right eye: CF in the upper temporal quadrant. ABS in the upper nasal quadrant. CF in the lower temporal quadrant. ABS in the lower nasal quadrant. Left eye: CF in the upper nasal quadrant. ABS in the upper temporal quadrant. CF in the lower nasal quadrant. ABS in the lower temporal quadrant. Neck:      Trachea: No tracheal deviation. Cardiovascular:      Rate and Rhythm: Normal rate and regular rhythm. Pulses: Normal pulses. Radial pulses are 2+ on the right side and 2+ on the left side. Pulmonary:      Effort: Pulmonary effort is normal. No tachypnea, bradypnea, accessory muscle usage, prolonged expiration, respiratory distress or retractions. She is not intubated. Breath sounds: Normal breath sounds and air entry. No stridor, decreased air movement or transmitted upper airway sounds. No decreased breath sounds, wheezing, rhonchi or rales. Chest:      Chest wall: No tenderness. Abdominal:      General: Abdomen is flat. Bowel sounds are normal. There is no distension. Palpations: Abdomen is soft. Abdomen is not rigid. Tenderness: There is no abdominal tenderness. There is no guarding or rebound.  Negative signs include Ruiz's sign and McBurney's sign. Musculoskeletal:         General: No swelling, tenderness, deformity or signs of injury. Normal range of motion. Cervical back: Full passive range of motion without pain, normal range of motion and neck supple. No edema, erythema, rigidity or tenderness. Normal range of motion. Right lower leg: No edema. Left lower leg: No edema. Skin:     General: Skin is warm and dry. Coloration: Skin is not jaundiced or pale. Findings: No bruising, erythema, lesion or rash. Neurological:      General: No focal deficit present. Mental Status: She is alert and oriented to person, place, and time. Mental status is at baseline. GCS: GCS eye subscore is 4. GCS verbal subscore is 5. GCS motor subscore is 6. Cranial Nerves: Cranial nerve deficit present. No dysarthria or facial asymmetry. Sensory: Sensation is intact. No sensory deficit. Motor: Motor function is intact. No weakness, tremor, atrophy, abnormal muscle tone, seizure activity or pronator drift. Coordination: Coordination is intact. Coordination normal. Finger-Nose-Finger Test and Heel to [facility] Test normal.      Gait: Gait is intact. Gait normal.      Comments: NIHSS = 2 on initial evaluation at 1900   Psychiatric:         Attention and Perception: Attention normal.         Mood and Affect: Mood and affect normal.         Speech: Speech is delayed (intermittently delayed). Speech is not slurred. Behavior: Behavior normal. Behavior is cooperative.              DIAGNOSTIC RESULTS   :    Labs Reviewed   CBC WITH AUTO DIFFERENTIAL - Abnormal; Notable for the following components:       Result Value    WBC 13.9 (*)     Neutrophils Absolute 8.5 (*)     All other components within normal limits    Narrative:     Performed at:  [facility]) - [address]   Phone [phone]   COMPREHENSIVE METABOLIC PANEL - Abnormal; Notable for the following components:    Potassium 2.6 (*)     Chloride 94 (*)     Glucose 141 (*)     Total Bilirubin 1.1 (*)     ALT 7 (*)     All other components within normal limits    Narrative:     Feli Null tel. [phone],  Chemistry results called to and read back by Myrna Minor RN, 10/02/2021  19:41, by Graciela Rose  Performed at:  [address], [facility]   Phone [phone]   MAGNESIUM - Abnormal; Notable for the following components:    Magnesium 1.00 (*)     All other components within normal limits    Narrative:     Feli Null tel. [phone],  Chemistry results called to and read back by Myrna Minor RN, 10/02/2021  19:41, by Graciela Rose  Performed at:  [address], [facility]   Phone [phone]   POCT GLUCOSE - Abnormal; Notable for the following components:    POC Glucose 199 (*)     All other components within normal limits    Narrative:     Performed at:  [address], [facility]   Phone [phone]   TROPONIN    Narrative:     Feli Null tel. [phone],  Chemistry results called to and read back by Myrna Minor RN, 10/02/2021  19:41, by Graciela Rose  Performed at:  [address], [facility]   Phone [phone]   PROTIME-INR    Narrative:     Performed at:  [address], [facility]   Phone [phone]   ETHANOL    Narrative:     Fiona Coley [phone],  Chemistry results called to and read back by Myrna Minor RN, 10/02/2021  19:41, by Graciela Rose  Performed at:  [address], [facility]   Phone [phone]   SAMPLE POSSIBLE BLOOD BANK TESTING    Narrative:     Performed at:  [facility]  [address],  [place], [place]   Phone [phone]   URINALYSIS   POCT GLUCOSE       All other labs were within normal range or not returned asof this dictation. EKG: All EKG's are interpreted by the Emergency Department Physician who either signs or Co-signs this chart in the absence of a cardiologist.    The Ekg interpreted by me shows  normal sinus rhythm with a rate of 74  Axis is   Normal  QTc is  normal  Intervals and Durations are unremarkable. ST Segments: no acute change and nonspecific changes  No significant change from prior EKG dated - 3/3/21  No STEMI           RADIOLOGY:   Non-plain film images such as CT, Ultrasound and MRI are read by the radiologist. Nay Nicolas images are visualized and preliminarily interpreted by the  ED Provider with the belowfindings:        Interpretation per the Radiologist below, if available at the time of this note:     [facility]   Final Result   1. Occlusion of the right middle cerebral artery at the origin may be acute. Overall decreased size and number of enhancing right middle cerebral artery   branches in comparison to the left. This correlates with findings of large   acute/subacute right middle cerebral artery territory infarct. 2.  Short segment high-grade stenosis of a left middle cerebral artery M2   branch. Mild-to-moderate stenosis in the left middle cerebral artery M1   segment. Diffuse atherosclerotic irregularity of the more distal left middle   cerebral artery branches. 3.  The bilateral posterior cerebral arteries are diffusely small and not   well seen, underlying disease is not excluded. 4.  No hemodynamically significant stenosis in the bilateral cervical   internal carotid arteries per NASCET criteria. Bilateral carotid bulb   atherosclerotic plaque. 5.  Patent bilateral vertebral arteries.       Critical results were called by Dr. Brandi Salmon Sessions to Dr. Liza Sainz on   10/2/2021 at 19:49.      This scan was analyzed using [employer]  contact LVO. Identification of suspected   findings is not for diagnostic use beyond notification. Viz LVO is limited to   analysis of imaging data and should not be used in-lieu of full patient   evaluation or relied upon to make or confirm diagnosis. CT HEAD WO CONTRAST   Final Result   1. Large acute/subacute stroke in the right middle cerebral artery   territory. No hemorrhagic conversion. 2.  Sulcal effacement throughout the right cerebral hemisphere with partial   effacement of the right lateral and 3rd ventricles and minimal right left   midline shift measuring 2 mm. 3.  Chronic small vessel ischemic disease. Old left thalamic and basal   ganglia lacunar infarcts are similar to the prior study. Critical results were called by Dr. Cathryn Fiore to Dr. Yessi Dominguez on   10/2/2021 at 19:29. XR CHEST PORTABLE    (Results Pending)         PROCEDURES   Unless otherwise noted below, none     Procedures    CRITICAL CARE TIME   Time: 35 minutes  Includes repeat examinations, speaking with consultants, lab interpretation, charting, assessing due to concern for acute stroke, treating for severe hypertension with concern for hypertensive emergency requiring IV labetalol  Excludes separate billable procedures. Patient at risk for serious decompensation if not treated for this life-threatening/visual-threatening condition. CONSULTS: Spoke with Dr. Rafael Lea at 4689 and he stated that he will review CT scan due to concern for her potentially being a candidate for TPA. Spoke with Dr. Rafael Lea with stroke team again at [address] [place] [address] and he reviewed the imaging and stated that the CT without contrast showed concern for subacute infarct most likely happening yesterday and at this time she would not be a TPA candidate or a thrombectomy candidate. He stated she is okay to be admitted at [facility].   Spoke with Dr. Mendel Comber with radiology and he also confirmed that this is a large infarct that is most likely older and presentation and no obvious salvageable tissue at this time. Spoke with Dr. Dorys Garcia for admission. IP CONSULT TO STROKE TEAM  IP CONSULT TO HOSPITALIST    EMERGENCY DEPARTMENT COURSE and DIFFERENTIAL DIAGNOSIS/MDM:   Vitals:    Vitals:    10/02/21 1906 10/02/21 1909 10/02/21 1915 10/02/21 1930   BP:  (!) 233/93 (!) 233/93 (!) 233/93   Pulse:  78  74   Resp:  16  17   Temp:  99.6 °F (37.6 °C)     TempSrc:  Oral     SpO2:  96%  97%   Weight: 191 lb (86.6 kg)      Height: 5' 4\" (1.626 m)          Patient was given the following medications:  Medications   0.9 % sodium chloride bolus (500 mLs IntraVENous New Bag 10/2/21 1933)   potassium chloride (KLOR-CON M) extended release tablet 60 mEq (has no administration in time range)   labetalol (NORMODYNE;TRANDATE) injection 10 mg (has no administration in time range)   aspirin chewable tablet 324 mg (has no administration in time range)   labetalol (NORMODYNE;TRANDATE) injection 10 mg (has no administration in time range)   metoclopramide (REGLAN) injection 10 mg (has no administration in time range)   diphenhydrAMINE (BENADRYL) injection 12.5 mg (has no administration in time range)   iopamidol (ISOVUE-370) 76 % injection 75 mL (75 mLs IntraVENous Given 10/2/21 1927)     Patient was evaluated a concern for sudden visual change today associate with trouble seeing when looking to the left. It reportedly started 1 hour prior to arrival.  Even though her NIH equals 1 at this time, I am concerned that she is having an acute stroke and this would be considered disabling and therefore we did discuss with stroke team about the patient potentially being a candidate for TPA.   Initial head CT was concerning for subacute infarct and therefore she is not a TPA or thrombectomy candidate per stroke team.  Blood pressure was initially elevated greater than 335 systolically and therefore I did order IV labetalol due to concern for hypertensive emergency. She also received potassium due to concern for hypokalemia. Upon repeat assessment, she is aware that she will need to be admitted and has no significant complaints at this time other than a moderate headache. She is stable at time of admission and will need telemetry. The patient tolerated their visit well. The patient and / or the family were informed of the results of any tests, a time was given to answer questions. FINAL IMPRESSION      1. Cerebrovascular accident (CVA) due to occlusion of right middle cerebral artery ([address]    2. Peripheral visual field defect, left    3. Hypokalemia    4. Hypertensive emergency          DISPOSITION/PLAN   DISPOSITION  -decision to admit      PATIENT REFERRED TO:  No follow-up provider specified.     DISCHARGEMEDICATIONS:  New Prescriptions    No medications on file       DISCONTINUED MEDICATIONS:  Discontinued Medications    ACETAMINOPHEN-ASPIRIN BUFFERED ([address]) 250-250 MG TABS    Take by mouth as needed              (Please note that portions of this note were completed with a voicerecognition program.  Efforts were made to edit the dictations but occasionally words are mis-transcribed.)    Adriana Ayers MD (electronically signed)            Adriana Ayers MD  10/02/21 2013

## 2021-10-02 NOTE — CONSULTS
1905- Code Stroke alerted by Dr. Tripp Compton Call to [facility] Stroke Team  1906- CT made aware  1906- Lab made aware  Leo Hagen- Dr. Scott Zelaya returned page for Dr. Belia Kunz

## 2021-10-02 NOTE — ED NOTES
[address] with [address] and tech.      Rudy Hart RN  10/02/21 1935

## 2021-10-03 NOTE — PROGRESS NOTES
4 Eyes Skin Assessment     The patient is being assess for   Admission    I agree that 2 RN's have performed a thorough Head to Toe Skin Assessment on the patient. ALL assessment sites listed below have been assessed. Areas assessed for pressure by both nurses:   [x]   Head, Face, and Ears   [x]   Shoulders, Back, and Chest, Abdomen  [x]   Arms, Elbows, and Hands   [x]   Coccyx, Sacrum, and Ischium  [x]   Legs, Feet, and Heels        Skin Assessed Under all Medical Devices by both nurses:  N/A              All Mepilex Borders were peeled back and area peeked at by both nurses:  No: N/A  Please list where Mepilex Borders are located:  N/A             **SHARE this note so that the co-signing nurse is able to place an eSignature**    Co-signer eSignature: Electronically signed by Angela Aiken RN on 10/3/21 at 5:42 AM EDT    Does the Patient have Skin Breakdown related to pressure?   No     Luis Prevention initiated:  NA   Wound Care Orders initiated:  NA      [facility] nurse consulted for Pressure Injury (Stage 3,4, Unstageable, DTI, NWPT, Complex wounds)and New or Established Ostomies:  NA      Primary Nurse eSignature: Electronically signed by Richi Arenas RN on 10/3/21 at 5:39 AM EDT

## 2021-10-03 NOTE — PROGRESS NOTES
Occupational Therapy   Occupational Therapy Initial Assessment  Date: 10/3/2021   Patient Name: Mami Boyle  MRN: [id number]     : 1960    Date of Service: 10/3/2021    Discharge Recommendations:  IP Rehab       Assessment   Performance deficits / Impairments: Decreased functional mobility ; Decreased safe awareness;Decreased balance;Decreased vision/visual deficit; Decreased ADL status; Decreased ROM; Decreased strength;Decreased coordination  Assessment: pt normally independent with high level IADL's & functional mobility/driving without AD; now with severe Left visual neglect, absent Light touch sensation LUE, requiring max assist with ADL's & mod assist with transfers; pt to benefit from skilled OT services  OT Education: OT Role;Plan of Care;Precautions  Patient Education: disease specific:  importance of OOB to chair for meals & short periods of time, use of RED/nurse call light for assistance with transfers/ADL needs  Barriers to Learning: Left visual neglect  REQUIRES OT FOLLOW UP: Yes  Activity Tolerance  Activity Tolerance: Patient Tolerated treatment well  Activity Tolerance: semifowler's:  /94, HR = 66; sitting in chair after walking:  BP = 199/97  Safety Devices  Safety Devices in place: Yes  Type of devices: Call light within reach; Chair alarm in place; Left in chair;Nurse notified           Patient Diagnosis(es): The primary encounter diagnosis was Cerebrovascular accident (CVA) due to occlusion of right middle cerebral artery ([address] Diagnoses of Peripheral visual field defect, left, Hypokalemia, and Hypertensive emergency were also pertinent to this visit. has a past medical history of Hypertension. has a past surgical history that includes Hysterectomy and Cholecystectomy.            Restrictions  Restrictions/Precautions  Restrictions/Precautions: General Precautions, Fall Risk, Up as Tolerated  Position Activity Restriction  Other position/activity restrictions: telemetry    Subjective   General  Chart Reviewed: Yes  Patient assessed for rehabilitation services?: Yes  Family / Caregiver Present: Yes (sister)  Referring Practitioner: Dr. Rd Mac  Diagnosis: CVA with Left side hemiparesis, Left visual neglect  General Comment  Comments: RN cleared pt for OT eval; pt resting in bed, pt & family agreeable to therapy  Patient Currently in Pain: Yes  Pain Assessment  Pain Assessment: 0-10  Pain Level: 2  Pain Type: Acute pain  Pain Location: Head  Pain Orientation: Right  Functional Pain Assessment: Prevents or interferes some active activities and ADLs  Non-Pharmaceutical Pain Intervention(s): Ambulation/Increased Activity; Therapeutic presence;Repositioned  Response to Pain Intervention: Patient Satisfied  Pre Treatment Pain Screening  Intervention List: Patient able to continue with treatment    Social/Functional History  Social/Functional History  Lives With: Family (brother, (retired))  Type of Home: [facility] [address]: One level  Home Access: Stairs to enter with [address] - Number of Steps: 3  Entrance Stairs - Rails: Both  Bathroom Shower/Tub: Tub/Shower unit  Bathroom Toilet: Handicap height  Bathroom Equipment: Grab bars around toilet  Home Equipment: [address], Standard walker, Reacher  ADL Assistance: Independent  Homemaking Responsibilities: Yes  Meal Prep Responsibility: Primary  Laundry Responsibility: Primary  Cleaning Responsibility: Primary  Shopping Responsibility: Primary  Ambulation Assistance: Independent (without AD)  Transfer Assistance: Independent  Active : Yes  Mode of Transportation: Car  Occupation: On disability  Type of occupation:  in [employer] [employer] [facility]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, 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  Additional Comments: denies falls       Objective   Vision: Impaired  Hearing: Within functional limits    Orientation  Overall Orientation Status: Within Functional Limits     Balance  Sitting Balance: Supervision  Standing Balance:  Moderate assistance  Functional Mobility  Functional - Mobility Device: Rolling Walker  Activity:  (walking to door & back with PT)  Assist Level: Moderate assistance (to maintain Left UE  on walker handle, turning)  ADL  Grooming: Moderate assistance  LE Dressing: Moderate assistance    RUE Tone: Normotonic  LUE Tone: Hypotonic    Coordination  Movements Are Fluid And Coordinated: No  Coordination and Movement description: Fine motor impairments;Gross motor impairments; Left UE     Bed mobility  Supine to Sit: Minimal assistance  Sit to Supine: Unable to assess (Left up in chair upon exiting)  Transfers  Sit to stand: Moderate assistance  Stand to sit: Moderate assistance  Transfer Comments: mod cues for safe hand placement sit<-->stand due to Left visual neglect    Vision - Basic Assessment  Prior Vision: Wears glasses only for reading  Patient Visual Report: Past pointing/reaching  Visual Field Cut: Left     Cognition  Overall Cognitive Status: Exceptions  Arousal/Alertness: Appropriate responses to stimuli  Following Commands:  Follows one step commands with increased time  Attention Span: Attends with cues to redirect  Memory: Appears intact  Safety Judgement: Decreased awareness of need for safety;Decreased awareness of need for assistance  Insights: Decreased awareness of deficits  Initiation: Requires cues for some  Sequencing: Requires cues for some  Perception  Overall Perceptual Status: Impaired  Unilateral Attention: Cues to attend left visual field;Cues to attend to left side of body     Sensation  Overall Sensation Status: Impaired  Light Touch: Severe deficits in the LUE  Type of ROM/Therapeutic Exercise  Type of ROM/Therapeutic Exercise: AROM;AAROM  Comment: BUE seated in chair  Hand flex/ext: x  5  Reps mod AAROM LUE  Elbow flex/ext:  x 5   Reps mod AAROM   Forearm sup/pron:  x   5 Reps mod AAROM     LUE General AROM: 1/2 to 3/4 AROM proximal to distal  RUE AROM : [facility]     Plan   Plan  Times per week: 3-5x/ week  Current Treatment Recommendations: Strengthening, ROM, Balance Training, Functional Mobility Training, Safety Education & Training, Positioning, Self-Care / ADL, Neuromuscular Re-education, Cognitive/Perceptual Training    AM-PAC Score        AM-PAC Inpatient Daily Activity Raw Score: 13 (10/03/21 1157)  AM-PAC Inpatient ADL T-Scale Score : 32.03 (10/03/21 1157)  ADL Inpatient CMS 0-100% Score: 63.03 (10/03/21 1157)  ADL Inpatient CMS G-Code Modifier : CL (10/03/21 1157)    Goals  Short term goals  Time Frame for Short term goals: 1 week(10-10-21)  Short term goal 1: min assist with LE self care by 10-10-21  Short term goal 2: min assist with functional/toilet transfers by 10-08-21  Short term goal 3: min assist with bathroom mobility with LRAD (Least Restrictive Assistive Device) by 10-08-21  Short term goal 4: supervision with 10 reps LUE self ROM/AROM  Patient Goals   Patient goals : go home when able       Therapy Time   Individual Concurrent Group Co-treatment   Time In 1110         Time Out 1150         Minutes [address]

## 2021-10-03 NOTE — PROGRESS NOTES
MD questioned: K was 3.6 do we want to start the IV potassium 10meq/100ml every hour for 4 hours that was ordered? Md replied: no thanks, please discontinue.

## 2021-10-03 NOTE — CONSULTS
Consult Call Back    [phone]  Date:10/3/2021,  Time:11:34 AM    Electronically signed by Traci Kelly on 10/3/21 at 11:34 AM EDT

## 2021-10-03 NOTE — H&P
Hospital Medicine History & Physical      PCP: Dori Sofia, APRN - CNP    Date of Admission: 10/2/2021    Date of Service: Pt seen/examined on 10/2/2021  Pt seen/examined face to face on and admitted as inpatient with expected LOS greater than two midnights due to medical therapy    Chief Complaint:    Chief Complaint   Patient presents with    Eye Problem     loss of vision in left side; thinks it started an hour ago; has had headache the last 2 days; some numbness last night; only complaint is vision loss on left side        History Of Present Illness:      64 y.o. female who presented to [facility] with past medical history of hypertension, class I obesity, hyperlipidemia, GERD presented to the ED with chief complaint of eye vision change and headache. Patient reported that she has had eye problem going on for years have seen ophthalmology and was told that she has a lot of floaters in her left eye with nothing that can be done at this time. Patient reported there has not been change in regards to that side however has gotten worse with her severe headache that localized to the  sinus and frontal radiating to the back worsened with cough. Patient reports that her blood pressure is normally runs around 160s at home and has been taking her home medications daily. Patient reported that she did have a strokelike symptoms back in March where she underwent an MRI. Patient also reports that she has been dropping things on her left side that is new in addition to having left fifth finger numbness. Discussed and the patient is a full code      Past Medical History:          Diagnosis Date    Hypertension        Past Surgical History:          Procedure Laterality Date    CHOLECYSTECTOMY      HYSTERECTOMY         Medications Prior to Admission:      Prior to Admission medications    Medication Sig Start Date End Date Taking?  Authorizing Provider   amLODIPine (NORVASC) 10 MG tablet Take 1 tablet kg/m²     General appearance:  mild acute distress, appears older than stated age  [de-identified]:   atraumatic, sclera anicteric, Conjunctivae clear. Neck: Supple,Trachea midline, no goiter  Respiratory:minimal accessory muscle usage, Normal respiratory effort. Clear to auscultation, bilaterally without wheezing  Cardiovascular:  Regular rate and rhythm, capillary refill 2 seconds  Abdomen: Soft, non-tender, non-distended with normal bowel sounds. Musculoskeletal:  No clubbing, cyanosis. trace edema LE bilaterally. Skin: turgor normal.  No new rashes or lesions. Neurologic: Alert and oriented x4, NIHSS 2  Labs:     Recent Labs     10/02/21  1908   WBC 13.9*   HGB 14.3   HCT 40.7        Recent Labs     10/02/21  1908      K 2.6*   CL 94*   CO2 26   BUN 8   CREATININE 0.8   CALCIUM 8.9     Recent Labs     10/02/21  1908   AST 17   ALT 7*   BILITOT 1.1*   ALKPHOS 88     Recent Labs     10/02/21  1908   INR 1.07     Recent Labs     10/02/21  1908   TROPONINI <0.01       Urinalysis:      Lab Results   Component Value Date    NITRU Negative 03/03/2021    WBCUA 3-5 03/03/2021    BACTERIA Rare 03/03/2021    RBCUA 0-2 03/03/2021    BLOODU Negative 03/03/2021    SPECGRAV 1.025 03/03/2021    GLUCOSEU Negative 03/03/2021       Radiology:     CXR: I have reviewed the CXR with the following interpretation:   No acute process  EKG:  I have reviewed the EKG with the following interpretation: Normal sinus rhythm      XR CHEST PORTABLE   Final Result   No acute cardiopulmonary abnormality. CTA HEAD NECK W CONTRAST   Final Result   1. Occlusion of the right middle cerebral artery at the origin may be acute. Overall decreased size and number of enhancing right middle cerebral artery   branches in comparison to the left. This correlates with findings of large   acute/subacute right middle cerebral artery territory infarct. 2.  Short segment high-grade stenosis of a left middle cerebral artery M2   branch. Mild-to-moderate stenosis in the left middle cerebral artery M1   segment. Diffuse atherosclerotic irregularity of the more distal left middle   cerebral artery branches. 3.  The bilateral posterior cerebral arteries are diffusely small and not   well seen, underlying disease is not excluded. 4.  No hemodynamically significant stenosis in the bilateral cervical   internal carotid arteries per NASCET criteria. Bilateral carotid bulb   atherosclerotic plaque. 5.  Patent bilateral vertebral arteries. Critical results were called by Dr. Madison Fiore to Dr. Aman Tafoya on   10/2/2021 at 19:49. This scan was analyzed using Viz. ai contact LVO. Identification of suspected   findings is not for diagnostic use beyond notification. Viz LVO is limited to   analysis of imaging data and should not be used in-lieu of full patient   evaluation or relied upon to make or confirm diagnosis. CT HEAD WO CONTRAST   Final Result   1. Large acute/subacute stroke in the right middle cerebral artery   territory. No hemorrhagic conversion. 2.  Sulcal effacement throughout the right cerebral hemisphere with partial   effacement of the right lateral and 3rd ventricles and minimal right left   midline shift measuring 2 mm. 3.  Chronic small vessel ischemic disease. Old left thalamic and basal   ganglia lacunar infarcts are similar to the prior study.       Critical results were called by Dr. Madison Fiore to Dr. Aman Tafoya on   10/2/2021 at 19:29.             ASSESSMENT AND PLAN:    Ranjana Robert [address] Problems    Diagnosis Date Noted    Acute ischemic stroke [facility]) [I63.9] 10/02/2021     Acute ischemic stroke:  CTA showing occlusion of the right MCA at the origin may be acute and left MCA M2 short segment high-grade stenosis  Stroke team was called and reported that the patient is not a candidate for mechanical thrombectomy or TPA  Acute CVA protocol initiated  Labs for stratification pending  Stroke protocol initiated    Hypertensive emergency:  Continue with permissive hypertension  Goal is to keep BP below 220/120    Hypokalemia: Replaced    Hypomagnesemia: Replaced    Leukocytosis: Likely reactive continue to monitor  IVF and recheck    Diet: NPO except meds ordered    DVT Prophylaxis: Held    Dispo:   Expected LOS greater than two DO Katja

## 2021-10-03 NOTE — PROGRESS NOTES
MD notified 7:31: Critical K 2.6 today. Was replaced with 61 oral yesterday.     Unread    MD re notified: 8:06

## 2021-10-03 NOTE — PROGRESS NOTES
Hospitalist Progress Note      PCP: Delfin Sofia, APRN - CNP    Date of Admission: 10/2/2021    Chief Complaint:    loss of vision in left side; thinks it started an hour ago; has had headache the last 2 days; some numbness last night; only complaint is vision loss on left side         Hospital Course: This 35-year-old female with history of hypertension hyperlipidemia, GERD, obesity recent CVA March 2, 2021  Possible acute to subacute infarct within the genu of the corpus callosum, on   the left hand side.         Admitted with a loss of vision on the left side, left-sided weakness dropping things on her left side and left fifth finger numbness and tingling CT of the head in the emergency room  1.  Large acute/subacute stroke in the right middle cerebral artery   territory.  No hemorrhagic conversion. Stroke team was consulted patient is not a candidate for TPA, patient was noted to have hypertensive urgency elevated blood pressure. Subjective: Patient complains of right-sided headache denies any chest pain no shortness of breath no nausea vomiting no difficulty swallowing. Medications:  Reviewed    Infusion Medications   Scheduled Medications    potassium chloride  40 mEq Oral TID    aspirin  81 mg Oral Daily    Or    aspirin  300 mg Rectal Daily    atorvastatin  80 mg Oral Nightly    covid-19 vaccine  1 Dose IntraMUSCular Prior to discharge     PRN Meds: potassium chloride, magnesium sulfate, promethazine **OR** ondansetron, acetaminophen **OR** acetaminophen, polyethylene glycol, perflutren lipid microspheres, labetalol    No intake or output data in the 24 hours ending 10/03/21 0948    Physical Exam Performed:    BP (!) 208/94   Pulse 70   Temp 99 °F (37.2 °C) (Oral)   Resp 16   Ht 5' 4\" (1.626 m)   Wt 191 lb 12.8 oz (87 kg)   SpO2 94%   BMI 32.92 kg/m²     General appearance: No apparent distress, appears stated age and cooperative.   HEENT: Pupils equal, round, and reactive to light. Conjunctivae/corneas clear. Neck: Supple, with full range of motion. No jugular venous distention. Trachea midline. Respiratory:  Normal respiratory effort. Clear to auscultation, bilaterally without Rales/Wheezes/Rhonchi. Cardiovascular: Regular rate and rhythm with normal S1/S2 without murmurs, rubs or gallops. Abdomen: Soft, non-tender, non-distended with normal bowel sounds. Musculoskeletal: No clubbing, cyanosis or edema bilaterally. Full range of motion without deformity. Skin: Skin color, texture, turgor normal.  No rashes or lesions. Neurologic:  Neurovascularly intact without any focal sensory/motor deficits. Cranial nerves: II-XII intact, grossly non-focal.  Psychiatric: Alert and oriented, thought content appropriate, normal insight  Capillary Refill: Brisk,3 seconds, normal   Peripheral Pulses: +2 palpable, equal bilaterally       Labs:   Recent Labs     10/02/21  1908 10/03/21  0646   WBC 13.9* 9.8   HGB 14.3 13.5   HCT 40.7 39.5    166     Recent Labs     10/02/21  1908 10/03/21  0546    139   K 2.6* 2.6*   CL 94* 102   CO2 26 23   BUN 8 6*   CREATININE 0.8 0.7   CALCIUM 8.9 8.4     Recent Labs     10/02/21  1908 10/03/21  0546   AST 17 15   ALT 7* <5*   BILITOT 1.1* 1.2*   ALKPHOS 88 79     Recent Labs     10/02/21  1908   INR 1.07     Recent Labs     10/02/21  1908 10/02/21  2333   TROPONINI <0.01 <0.01       Urinalysis:      Lab Results   Component Value Date    NITRU Negative 10/02/2021    WBCUA 21-50 10/02/2021    BACTERIA 2+ 10/02/2021    RBCUA 3-4 10/02/2021    BLOODU SMALL 10/02/2021    SPECGRAV <=1.005 10/02/2021    GLUCOSEU Negative 10/02/2021       Radiology:  XR CHEST PORTABLE   Final Result   No acute cardiopulmonary abnormality. CTA HEAD NECK W CONTRAST   Final Result   1. Occlusion of the right middle cerebral artery at the origin may be acute.    Overall decreased size and number of enhancing right middle cerebral artery   branches in comparison to acute/subacute stroke in the right middle cerebral artery   territory.  No hemorrhagic conversion. CT of the head and neck  1.  Occlusion of the right middle cerebral artery at the origin may be acute. Overall decreased size and number of enhancing right middle cerebral artery   branches in comparison to the left.  This correlates with findings of large   acute/subacute right middle cerebral artery territory infarct.       2.  Short segment high-grade stenosis of a left middle cerebral artery M2   branch.  Mild-to-moderate stenosis in the left middle cerebral artery M1   segment.  Diffuse atherosclerotic irregularity of the more distal left middle   cerebral artery branches.       3.  The bilateral posterior cerebral arteries are diffusely small and not   well seen, underlying disease is not excluded.       4.  No hemodynamically significant stenosis in the bilateral cervical   internal carotid arteries per NASCET criteria.  Bilateral carotid bulb   atherosclerotic plaque.       5.  Patent bilateral vertebral arteries. MRI of the brain with and without contrast pending,. Stroke team was consulted on admission not a candidate for TPA or thrombectomy. Neurology consulted. 2D echo pending. Continue with aspirin high-dose statin. 2.  Hypertensive emergency unable to control blood pressure will transfer patient to ICU for close monitoring possible nicardipine drip, monitor blood pressure per stroke protocol. Consult intensivist.  Called and talk to the nurse taking care of the patient. 3.  Hypokalemia status post p.o. supplement repeat potassium level pending. Started on IV potassium supplement. 4.  Hypomagnesemia status post supplement. DVT Prophylaxis: SCDs  Diet: ADULT DIET; Regular; 3 carb choices (45 gm/meal);  Low Fat/Low Chol/High Fiber/2 gm Na  Code Status: Full Code    PT/OT Eval Status: Leilani Loja MD

## 2021-10-03 NOTE — PROGRESS NOTES
MD notified: K was 2.6 this Am. 80 K was given orally. K rechecked and is now 2.9 critical result. K 20 3x daily is ordered, next due at 9pm. Would you like additional intervention for 2.9 K result?

## 2021-10-03 NOTE — ED NOTES
Report given to floor RN, patient left via wheelchair to floor with RN in stable condition.       Kota Parents, RN  10/02/21 1439

## 2021-10-03 NOTE — PROGRESS NOTES
Physical Therapy    Facility/Department: [facility] B3 - MED SURG  Initial Assessment and Treatment    NAME: Yancy Robert  : 1960  MRN: [id number]    Date of Service: 10/3/2021    Discharge Recommendations:  IP Rehab   PT Equipment Recommendations  Equipment Needed: No  Other: defer to patient's facility  If pt is unable to be seen after this session, please let this note serve as discharge summary. Please see case management note for discharge disposition. Thank you. Barriers to home discharge:   [x] Steps to access home entry or bed/bath:   [x] Patient or family requests DC to other than home     Assessment   Body structures, Functions, Activity limitations: Decreased functional mobility ; Decreased strength;Decreased endurance;Decreased vision/visual deficit; Decreased coordination;Decreased posture;Decreased ADL status; Decreased safe awareness;Decreased high-level IADLs;Decreased cognition;Decreased balance;Decreased fine motor control  Assessment: Patient is a 64year old female who was admitted to [facility] on 10/2/21 with acute CVA. Her CT of her head demonstrated right MCA CVA. Patient is normally independent with all functional mobility. Today she required min assist for bed mobility, mod assist for transfers and mod assist to ambulate up to 15 feet with a rolling walker. Patient functioning below baseline and would benefit from skilled therapy to address current deficits mentioned above. Patient presented with the therapy deficits listed above. PT recommends that this patient receive skilled PT in the acute inpatient rehabilitation setting, when medically stable, in order to address these deficits and to help her maximize her safety and independence with all functional mobility. PT to continue to follow. Patient is highly motivated to participate and can tolerate 3+ hours of therapy daily.   Treatment Diagnosis: Decreased independence with functional mobility  Specific instructions for Next Treatment: progress mobility as tolerated  Prognosis: Good  Decision Making: Medium Complexity  PT Education: Goals; Functional Mobility Training;Precautions;PT Role;Pressure Relief; Injury Prevention;Plan of Care;Equipment;Gait Training;Home Exercise Program;General Safety; Low Vision Education;Disease Specific Education  Patient Education: Disease Specific Education: Patient educated on importance of OOB mobility, prevention of complications of bedrest, and general safety during hospitalization. Patient verbalized understanding  Barriers to Learning: left visual neglect. REQUIRES PT FOLLOW UP: Yes  Activity Tolerance  Activity Tolerance: Patient limited by fatigue;Patient limited by endurance  Activity Tolerance: Pre activity: 194/94 94% room air. 66 BPM    Post activity: 199/97 64 BPM       Patient Diagnosis(es): The primary encounter diagnosis was Cerebrovascular accident (CVA) due to occlusion of right middle cerebral artery ([address] Diagnoses of Peripheral visual field defect, left, Hypokalemia, and Hypertensive emergency were also pertinent to this visit. has a past medical history of Hypertension. has a past surgical history that includes Hysterectomy and Cholecystectomy. Restrictions  Restrictions/Precautions  Restrictions/Precautions: General Precautions, Fall Risk, Up as Tolerated  Position Activity Restriction  Other position/activity restrictions: telemetry  Vision/Hearing  Vision: Impaired  Vision Exceptions:  (patient said that she has chronic \"floaters. \" Now since her stroke she has severe left sided neglect.)  Hearing: Within functional limits     Subjective  General  Chart Reviewed: Yes  Patient assessed for rehabilitation services?: Yes  Additional Pertinent Hx: HPI per chart, \"56 y.o. female who presented to [facility] with past medical history of hypertension, class I obesity, hyperlipidemia, GERD presented to the ED with chief complaint of eye vision change and headache.   Patient also reports that she has been dropping things on her left side that is new in addition to having left fifth finger numbness. CT Head: 1. Large acute/subacute stroke in the right middle cerebral arteryterritory. No hemorrhagic conversion. Stroke team was called and reported that the patient is not a candidate for mechanical thrombectomy or TPA. \"  Response To Previous Treatment: Not applicable  Family / Caregiver Present: Yes (sister at bedside)  Referring Practitioner: Jason Arias DO  Referral Date : 10/02/21  Follows Commands: Impaired  General Comment  Comments: Supine in bed upon entry. Cleared for therapy. Subjective  Subjective: Patient agreed to participate. Pain Screening  Patient Currently in Pain: Yes  Pain Assessment  Pain Assessment: 0-10  Pain Level: 3  Pain Type: Acute pain  Pain Location: Head  Pain Orientation: Right  Pain Descriptors: Headache  Functional Pain Assessment: Prevents or interferes some active activities and ADLs  Non-Pharmaceutical Pain Intervention(s): Ambulation/Increased Activity;Repositioned; Rest  Response to Pain Intervention: Patient Satisfied  Vital Signs  Patient Currently in Pain: Yes  Pre Treatment Pain Screening  Intervention List: Patient able to continue with treatment    Orientation  Orientation  Overall Orientation Status: Within Functional Limits  Social/Functional History  Social/Functional History  Lives With: Family (brother, (retired))  Type of Home: [address] [place] [address],[address]: One level  Home Access: Stairs to enter with [address] - Number of Steps: 3  Entrance Stairs - Rails: Both  Bathroom Shower/Tub: Tub/Shower unit  Bathroom Toilet: Handicap height  Bathroom Equipment: Grab bars around toilet  Home Equipment: Nevaeh Mcmanus, Standard walker, Reacher  ADL Assistance: Independent  Homemaking Responsibilities: Yes  Meal Prep Responsibility: Primary  Laundry Responsibility: Primary  Cleaning Responsibility: Primary  Shopping Responsibility: Primary  Ambulation Assistance: Independent (without AD)  Transfer Assistance: Independent  Active : Yes  Mode of Transportation: Car  Occupation: On disability  Type of occupation:  in [employer]: walk, 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  Additional Comments: denies falls  Cognition   Cognition  Overall Cognitive Status: Exceptions  Arousal/Alertness: Appropriate responses to stimuli  Following Commands: Follows one step commands with increased time  Attention Span: Attends with cues to redirect  Memory: Appears intact  Safety Judgement: Decreased awareness of need for safety;Decreased awareness of need for assistance  Insights: Decreased awareness of deficits  Initiation: Requires cues for some  Sequencing: Requires cues for some    Objective     Observation/Palpation  Posture: Fair  Observation: severe left sided neglect. left sided facial droop. PROM RLE (degrees)  RLE PROM: WFL  AROM RLE (degrees)  RLE AROM: WFL  PROM LLE (degrees)  LLE PROM: WFL  AROM LLE (degrees)  LLE AROM : WFL  Strength RLE  Strength RLE: WFL  Strength LLE  Strength LLE: Exception  L Hip Flexion: 4-/5  L Knee Extension: 4-/5  L Ankle Dorsiflexion: 4-/5  Coordination  Heel to Shin: Abnormal  Sensation  Overall Sensation Status: Impaired  Light Touch: Severe deficits in the LUE  Bed mobility  Supine to Sit: Minimal assistance  Sit to Supine: Unable to assess (patient in chair)  Scooting: Minimal assistance (to scoot to the edge of the bed)  Comment: head of bed elevated  Transfers  Sit to Stand: Moderate Assistance  Stand to sit: Moderate Assistance  Bed to Chair: Moderate assistance  Comment: with RW. severe left sided neglect. Ambulation  Ambulation?: Yes  More Ambulation?: Yes  Ambulation 1  Surface: level tile  Device: No Device;Hand-Held Assist  Assistance: Moderate assistance  Quality of Gait: ataxic gait pattern. 1 loss of balance. mod assist to correct. severe left sided neglect. left foot drag. leftward lean.   Gait Deviations: Slow Rosalba;Decreased step length;Decreased step height  Distance: bed to chair ~3 feet  Comments: No complaints of shortness of breath, chest pain or dizziness. Ambulation 2  Surface - 2: level tile  Device 2: Rolling Walker  Assistance 2: Moderate assistance  Quality of Gait 2: ataxic gait pattern. mod assist to maintain patient's hand on her rolling walker. mod assist for RW placement (especially during turns and to help patient avoid bumping walker into other object due to her neglect) forward flexed posture, cueing to correct. cueing to maintain base of support within rolling walker. Gait Deviations: Slow Rosalba;Decreased step length;Decreased step height  Distance: 15 feet x 2  Comments: No complaints of shortness of breath, chest pain or dizziness. Balance  Posture: Fair  Sitting - Static: Fair  Sitting - Dynamic: Fair  Standing - Static: Poor  Standing - Dynamic: Poor  Comments: with RW  Exercises  Hip Flexion: 1 x 10  Knee Long Arc Quad: 1 x 10  Ankle Pumps: 1 x 10  Comments: cueing for sequencing and technique. Other exercises  Other exercises?: No     Plan   Plan  Times per week: 3-5/week  Plan weeks: 1 week 10/10/21  Specific instructions for Next Treatment: progress mobility as tolerated  Current Treatment Recommendations: Strengthening, Functional Mobility Training, Equipment Evaluation, Education, & procurement, ROM, Transfer Training, Gait Training, Safety Education & Training, Balance Training, ADL/Self-care Training, Endurance Training, Positioning, Patient/Caregiver Education & Training, Stair training  Safety Devices  Type of devices:  All fall risk precautions in place, Call light within reach, Left in chair, Chair alarm in place, Gait belt, Patient at risk for falls, Left in bed, Nurse notified (patient's sister at bedside)    AM-PAC Score     AM-PAC Inpatient Mobility without Stair Climbing Raw Score : 12 (10/03/21 1259)  AM-PAC Inpatient without Stair Climbing T-Scale Score : 37.26 (10/03/21 1259)  Mobility Inpatient CMS 0-100% Score: 63.03 (10/03/21 1259)  Mobility Inpatient without Stair CMS G-Code Modifier : CL (10/03/21 1259)       Goals  Short term goals  Time Frame for Short term goals: 1 week 10/10/21  Short term goal 1: Supine <> sit with supervision  Short term goal 2: Sit <> stand with CGA  Short term goal 3: Bed <> chair with CGA  Short term goal 4: Ambulate 50 feet with LRAD and min assist.  Short term goal 5: By 10/6/21 Patient will tolerate 12-15 reps of her exercises to maximize her strength/endurance  Patient Goals   Patient goals : To become stronger.        Therapy Time   Individual Concurrent Group Co-treatment   Time In 1107         Time Out 1150         Minutes 43         Timed Code Treatment Minutes: 33 Minutes (10 minute evaluation)       Rashi Brady, PT

## 2021-10-03 NOTE — PROGRESS NOTES
Pt's k was 2.6 this am, pt was given 80 K and was rechecked to be 2.9.  Additional 80 K was given and pt to be rechecked again at 6pm.

## 2021-10-04 PROBLEM — I16.1 HYPERTENSIVE EMERGENCY: Status: ACTIVE | Noted: 2021-01-01

## 2021-10-04 NOTE — PROGRESS NOTES
Hospitalist Progress Note      PCP: Zaid Sofia, APRN - CNP    Date of Admission: 10/2/2021    Chief Complaint: vision changes       Subjective:  She still has the L visual field defect. She has a weak L  and L hand paresthesias and apraxia. She also seems to have some subtle cognitive impairment. Medications:  Reviewed    Infusion Medications   Scheduled Medications    mupirocin   Nasal BID    chlorthalidone  25 mg Oral Daily    amLODIPine  10 mg Oral Daily    carvedilol  12.5 mg Oral BID WC    aspirin  81 mg Oral Daily    Or    aspirin  300 mg Rectal Daily    atorvastatin  80 mg Oral Nightly    covid-19 vaccine  1 Dose IntraMUSCular Prior to discharge     PRN Meds: labetalol, HYDROcodone 5 mg - acetaminophen, potassium chloride, magnesium sulfate, promethazine **OR** ondansetron, acetaminophen **OR** acetaminophen, polyethylene glycol, perflutren lipid microspheres      Intake/Output Summary (Last 24 hours) at 10/4/2021 5807  Last data filed at 10/4/2021 1000  Gross per 24 hour   Intake 240 ml   Output --   Net 240 ml       Physical Exam Performed:    BP (!) 181/74   Pulse 61   Temp 98.2 °F (36.8 °C) (Axillary)   Resp 15   Ht 5' 4\" (1.626 m)   Wt 186 lb 1.1 oz (84.4 kg)   SpO2 95%   BMI 31.94 kg/m²     General appearance: No apparent distress, appears stated age and cooperative. HEENT: Pupils equal, round, and reactive to light. Conjunctivae/corneas clear. Neck: Supple, with full range of motion. No jugular venous distention. Trachea midline. Respiratory:  Normal respiratory effort. Clear to auscultation, bilaterally without Rales/Wheezes/Rhonchi. Cardiovascular: Regular rate and rhythm with normal S1/S2 without murmurs, rubs or gallops. Abdomen: Soft, non-tender, non-distended with normal bowel sounds. Musculoskeletal: No clubbing, cyanosis or edema bilaterally. Full range of motion without deformity.   Skin: Skin color, texture, turgor normal.  No rashes or lesions. Neurologic:  She still has the L visual field defect. She has a weak L  and L hand paresthesias and apraxia. Psychiatric: Alert and oriented, thought content appropriate, has insight. She does seem to have some subtle cognitive impairment. Capillary Refill: Brisk,3 seconds, normal   Peripheral Pulses: +2 palpable, equal bilaterally       Labs:   Recent Labs     10/02/21  1908 10/03/21  0646 10/04/21  0437   WBC 13.9* 9.8 12.4*   HGB 14.3 13.5 14.4   HCT 40.7 39.5 41.7    166 241     Recent Labs     10/02/21  1908 10/03/21  0546 10/03/21  1225 10/03/21  1650 10/04/21  0436   NA  --  139 137  --  137   K   < > 2.6* 2.9* 3.6 4.2   CL  --  102 97*  --  104   CO2  --  23 29  --  23   BUN  --  6* 5*  --  6*   CREATININE  --  0.7 0.8  --  0.6   CALCIUM  --  8.4 8.6  --  9.2    < > = values in this interval not displayed. Recent Labs     10/02/21  1908 10/03/21  0546 10/04/21  0436   AST 17 15 17   ALT 7* <5* 7*   BILITOT 1.1* 1.2* 1.5*   ALKPHOS 88 79 87     Recent Labs     10/02/21  1908   INR 1.07     Recent Labs     10/02/21  1908 10/02/21  2333   TROPONINI <0.01 <0.01       Urinalysis:      Lab Results   Component Value Date    NITRU Negative 10/02/2021    WBCUA 21-50 10/02/2021    BACTERIA 2+ 10/02/2021    RBCUA 3-4 10/02/2021    BLOODU SMALL 10/02/2021    SPECGRAV <=1.005 10/02/2021    GLUCOSEU Negative 10/02/2021       Radiology:  XR CHEST PORTABLE   Final Result   No acute cardiopulmonary abnormality. CTA HEAD NECK W CONTRAST   Final Result   1. Occlusion of the right middle cerebral artery at the origin may be acute. Overall decreased size and number of enhancing right middle cerebral artery   branches in comparison to the left. This correlates with findings of large   acute/subacute right middle cerebral artery territory infarct. 2.  Short segment high-grade stenosis of a left middle cerebral artery M2   branch.   Mild-to-moderate stenosis in the left middle cerebral artery M1   segment. Diffuse atherosclerotic irregularity of the more distal left middle   cerebral artery branches. 3.  The bilateral posterior cerebral arteries are diffusely small and not   well seen, underlying disease is not excluded. 4.  No hemodynamically significant stenosis in the bilateral cervical   internal carotid arteries per NASCET criteria. Bilateral carotid bulb   atherosclerotic plaque. 5.  Patent bilateral vertebral arteries. Critical results were called by Dr. Robert Fiore to Dr. Rafa Wells on   10/2/2021 at 19:49. This scan was analyzed using [employer].  contact LVO. Identification of suspected   findings is not for diagnostic use beyond notification. Viz LVO is limited to   analysis of imaging data and should not be used in-lieu of full patient   evaluation or relied upon to make or confirm diagnosis. CT HEAD WO CONTRAST   Final Result   1. Large acute/subacute stroke in the right middle cerebral artery   territory. No hemorrhagic conversion. 2.  Sulcal effacement throughout the right cerebral hemisphere with partial   effacement of the right lateral and 3rd ventricles and minimal right left   midline shift measuring 2 mm. 3.  Chronic small vessel ischemic disease. Old left thalamic and basal   ganglia lacunar infarcts are similar to the prior study. Critical results were called by Dr. Robert Fiore to Dr. Rafa Wells on   10/2/2021 at 19:29. MRI BRAIN W WO CONTRAST    (Results Pending)           Assessment/Plan:    Active Hospital Problems    Diagnosis     Peripheral visual field defect, left [H53.452]     Acute ischemic stroke ([facility] [address] [I63.9]     Hypertensive emergency [I16.1]        64 Y F with a h/o smoking, obesity, HTN, and HLD presented with one day of left-sided vision changes, dropping things with her left hand, and LUE paresthesias.         Suspected acute ischemic stroke, probably large area(s)  - probably

## 2021-10-04 NOTE — PROGRESS NOTES
Speech Language Pathology  Facility/Department: [facility] C2 CARD TELEMETRY   CLINICAL BEDSIDE SWALLOW EVALUATION      Recommended Diet and Intervention  Diet Solids Recommendation: Dysphagia Soft and Bite-Sized (Dysphagia III)  Liquid Consistency Recommendation: Thin  Recommended Form of Meds: Whole with puree  Recommendations: Assist feed;Dysphagia treatment  Therapeutic Interventions: Bolus control exercises;Diet tolerance monitoring;Oral care;Oral motor exercises; Patient/Family education    Compensatory Swallowing Strategies  Compensatory Swallowing Strategies: Assist feed;Remain upright for 30-45 minutes after meals;Upright as possible for all oral intake; Check for pocketing of food on the Left;Lingual sweep      NAME: Sandhya Camarena  : 1960  MRN: [id number]    ADMISSION DATE: 10/2/2021  ADMITTING DIAGNOSIS: has Hypertensive emergency; Hypomagnesemia; Syncope; Smoker; Hypokalemia; Thyroid nodule; Hypertensive urgency; CVA (cerebral vascular accident) ([facility] [address] HTN (hypertension), benign; Dyslipidemia; GERD (gastroesophageal reflux disease); Left lumbar radiculitis; Lumbar spondylosis; Myofascial muscle pain; Tobacco use disorder; SOB (shortness of breath); Dizziness; Acute ischemic stroke ([address] and Peripheral visual field defect, left on their problem list.  ONSET DATE: 10/2/2021    Recent Chest Xray/CT of Chest:  10/2/2021: No acute cardiopulmonary abnormality.     Date of Eval: 10/4/2021  Evaluating Therapist: CLAUDIA Hobbs    Current Diet level:  Current Diet : Regular  Current Liquid Diet : Thin    Primary Complaint  MD H&P: \"56 y.o. female who presented to [facility] with past medical history of hypertension, class I obesity, hyperlipidemia, GERD presented to the ED with chief complaint of eye vision change and headache.     Patient reported that she has had eye problem going on for years have seen ophthalmology and was told that she has a lot of floaters in her left eye with nothing that can be done at this time. Patient reported there has not been change in regards to that side however has gotten worse with her severe headache that localized to the  sinus and frontal radiating to the back worsened with cough. Patient reports that her blood pressure is normally runs around 160s at home and has been taking her home medications daily. Patient reported that she did have a strokelike symptoms back in March where she underwent an MRI. Patient also reports that she has been dropping things on her left side that is new in addition to having left fifth finger numbness. Discussed and the patient is a full code\"    Pain:  Pain Assessment  Pain Assessment: 0-10  Pain Level: 3  Patient's Stated Pain Goal: No pain  Pain Type: Acute pain  Pain Location: Head  Pain Orientation: Right  Pain Descriptors: Aching  Pain Frequency: Continuous  Pain Onset: Progressive  Clinical Progression: Gradually worsening  Functional Pain Assessment: Prevents or interferes some active activities and ADLs  Non-Pharmaceutical Pain Intervention(s): Repositioned  Response to Pain Intervention: Patient Satisfied    Reason for Referral  Jacqueline Erickson was referred for a bedside swallow evaluation to assess the efficiency of her swallow function, identify signs and symptoms of aspiration and make recommendations regarding safe dietary consistencies, effective compensatory strategies, and safe eating environment. Per PT, pt drooling and coughing on her saliva today. Impression  Dysphagia Diagnosis: Mild to moderate oral stage dysphagia;Mild pharyngeal stage dysphagia  Dysphagia Outcome Severity Scale: Level 5: Mild dysphagia- Distant supervision.  May need one diet consistency restricted     Dysphagia Impression : Impaired bolus containment with solids with reduced sensation of Left lower lip as well as reduced Left facial asymmetry and reduced strength Left lip seal. Minimal solid residue in Left lateral sulci clears with spontaneous lingual sweeps. Oral control of liquids by spoon/cup/straw is functional.  Reactive-appearing cough post swallow of initial bolus of puree did not recur with other puree, solid or thin liquid trials. Aspiration risk is elevated in context of above as well as reduced attention to the task of eating. She will require verbal and visual cueing for feeding due to Left visual inattention. Vitals:  Sp02: 95%  RR: 18  BP: 181/74    Treatment Plan  Requires SLP Intervention: Yes  Duration/Frequency of Treatment: 3-5x/wk for 5 days  D/C Recommendations: Inpatient rehabilitation;24 hour supervision/assistance  Referral To: Speech Evaluation    Recommended Diet and Intervention  Diet Solids Recommendation: Dysphagia Soft and Bite-Sized (Dysphagia III)  Liquid Consistency Recommendation: Thin  Recommended Form of Meds: Whole with puree  Recommendations: Assist feed;Dysphagia treatment  Therapeutic Interventions: Bolus control exercises;Diet tolerance monitoring;Oral care;Oral motor exercises; Patient/Family education    Compensatory Swallowing Strategies  Compensatory Swallowing Strategies: Assist feed;Remain upright for 30-45 minutes after meals;Upright as possible for all oral intake; Check for pocketing of food on the Left;Lingual sweep    Treatment/Goals  Short-term Goals  Timeframe for Short-term Goals: 3 days (10/7/2021)  Long-term Goals  Timeframe for Long-term Goals: 5 days (10/9/2021)  Goal 1: Pt will demonstrate clinically safe swallow of least restrictive diet and thin liquids without s/s aspiration. Dysphagia Goals: The patient will tolerate recommended diet without observed clinical signs of aspiration; The patient will recall and perform compensatory strategies, with no cues. ;The patient/caregiver will demonstrate understanding of compensatory strategies for improved swallowing safety.     General  Chart Reviewed: Yes  Comments: Admit with Left visual changes, impaired Left body movement and coordination. MRI pending. Subjective  Subjective: Denies difficulty swallowing prior to admission. Wears upper denture that is not currently present. Per RN, no overt difficulty chewing/swallowing her morning meal of softer solids. Behavior/Cognition: Alert; Cooperative;Pleasant mood; Lethargic  O2 Device: None (Room air)  Communication Observation: Dysarthria  Dentition: Some missing teeth (missing lower molar with existing anterior lower teeth, edentulous upper teeth but reports she often eats without her upper denture)  Patient Positioning: Upright in bed  Baseline Vocal Quality: Normal  Volitional Cough: Strong (worsens her headache)  Prior Dysphagia History: Denies  Consistencies Administered: Dysphagia Minced and Moist (Dysphagia II); Dysphagia Pureed (Dysphagia I); Thin;Thin - cup; Thin - straw;Mixed Consistencies  Pain Level: 3    Vision/Hearing  Vision  Vision: Impaired  Vision Exceptions: Wears glasses at all times; Visual field cut  Hearing  Hearing: Within functional limits    Oral Motor Deficits  Oral/Motor  Oral Motor: Exceptions to [facility]  Labial ROM: Reduced left  Labial Symmetry: Abnormal symmetry left  Labial Strength: Reduced  Labial Sensation: Reduced    Oral Phase Dysfunction  Oral Phase  Oral Phase: Exceptions  Oral Phase Dysfunction  Impaired Mastication: Mechanical soft  Spillage Left: Mechanical soft  Pocketing Left: Mechanical  soft  Oral Phase  Oral Phase - Comment: Reduced Left labial/facial sensation resulting in delayed sensation of Left solid bolus loss. Reduced oral bolus cohesion with solids with piecemeal swallows. Lingual sweep WFL in all directions to clear minimal solid residue from Left lateral sulci. Indicators of Pharyngeal Phase Dysfunction   Pharyngeal Phase  Pharyngeal Phase: Exceptions  Indicators of Pharyngeal Phase Dysfunction  Cough - Delayed: Puree (X1, not reproduced with repeat trials)  Pharyngeal Phase   Pharyngeal: Appears grossly timely in onset.  Spontaneous double swallows between boluses. Able to coordinate successive swallows of thin liquid by cup/straw. Delayed cough post swallow of initial bolus of puree which did not occur again across trials of puree, solids or thin liquid. Prognosis  Prognosis  Prognosis for safe diet advancement: good  Barriers to reach goals: cognitive deficits  Individuals consulted  Consulted and agree with results and recommendations: Patient    Education  Patient Education: Role of SLP in overall plan of care, diet texture recs  Patient Education Response: Verbalizes understanding;Needs reinforcement  Safety Devices in place: Yes  Type of devices: All fall risk precautions in place; Left in bed;Bed alarm in place;Nurse notified       Therapy Time  SLP Individual Minutes  Time In: 1125  Time Out: 305 [facility]  Minutes: [address]  [facility] MS CCC-SLP  Speech Language Pathologist     [place], [place]  10/4/2021 1:43 PM

## 2021-10-04 NOTE — PROGRESS NOTES
Occupational Therapy  Facility/Department: [facility] C2 CARD TELEMETRY  Daily Treatment Note  NAME: Dionte Roper  : 1960  MRN: [id number]    Date of Service: 10/4/2021    Discharge Recommendations:  IP Rehab  OT Equipment Recommendations  Other: defer to d/c location     Dionte Roper scored a 14/24 on the AM-PAC ADL Inpatient form. Current research shows that an AM-PAC score of 17 or less is typically not associated with a discharge to the patient's home setting. Based on the patient's AM-PAC score and their current ADL deficits, it is recommended that the patient have 5-7 sessions per week of Occupational Therapy at d/c to increase the patient's independence. At this time, this patient demonstrates the endurance, and/or tolerance for 3 hours of therapy each day, with a treatment frequency of 5-7x/wk. Please see assessment section for further patient specific details. If patient discharges prior to next session this note will serve as a discharge summary. Please see below for the latest assessment towards goals. Assessment   Performance deficits / Impairments: Decreased functional mobility ; Decreased safe awareness;Decreased balance;Decreased vision/visual deficit; Decreased ADL status; Decreased ROM; Decreased strength;Decreased coordination;Decreased high-level IADLs;Decreased cognition;Decreased fine motor control;Decreased endurance;Decreased sensation;Decreased posture  Assessment: Pt tolerated toileting with up to max A this date. Pt require mod A using RW to transfers to bedside commode. Pt continues to be very limited by decreased functional use of L side and impaired vision. Anticipate pt will require ongoing OT in order to increase functional status. Prognosis: Good  OT Education: OT Role;Plan of Care;Precautions; ADL Adaptive Strategies;Transfer Training; Low Vision Education  Patient Education: disease specific:  importance of OOB to chair for meals & short periods of time, use of RED/nurse call light for assistance with transfers/ADL needs  Barriers to Learning: Left visual neglect  REQUIRES OT FOLLOW UP: Yes  Activity Tolerance  Activity Tolerance: Patient Tolerated treatment well  Activity Tolerance: /90 - RN notified  Safety Devices  Safety Devices in place: Yes  Type of devices: Call light within reach;Nurse notified; All fall risk precautions in place; Left in bed;Bed alarm in place         Patient Diagnosis(es): The primary encounter diagnosis was Cerebrovascular accident (CVA) due to occlusion of right middle cerebral artery ([address] Diagnoses of Peripheral visual field defect, left, Hypokalemia, and Hypertensive emergency were also pertinent to this visit. has a past medical history of Hypertension. has a past surgical history that includes Hysterectomy and Cholecystectomy. Restrictions  Restrictions/Precautions  Restrictions/Precautions: General Precautions, Fall Risk, Up as Tolerated  Position Activity Restriction  Other position/activity restrictions: telemetry, ICU monitoring  Subjective   General  Chart Reviewed: Yes, Imaging, History and Physical  Patient assessed for rehabilitation services?: Yes  Family / Caregiver Present: No  Referring Practitioner: Dr. Evelin Cui  Diagnosis: CVA with Left side hemiparesis, Left visual neglect  General Comment  Comments: Pt in bed, agreeable to OT. Pt requesting to use the restroom. Orientation  Orientation  Overall Orientation Status: Within Functional Limits  Objective    ADL  Feeding: Moderate assistance;Verbal cueing; Increased time to complete (Pt unable to visualize tray; Pt required cues to turn head to the L in order to see tray. OT opened LUE in order to maintain grasp on cup of jello;  Pt using R hand to scoop jello into mouth)  LE Dressing: Maximum assistance  Toileting: Maximum assistance  Additional Comments: Pt functioning below baseline with ADLs due to significant deficits in vision and functional use of L arm Balance  Sitting Balance: Contact guard assistance  Standing Balance: Moderate assistance (L lateral lean)  Functional Mobility  Functional - Mobility Device: Rolling Walker  Activity: Other (bed to commode)  Assist Level: Moderate assistance  Functional Mobility Comments: Pt unable to see items on L side; Assistance to maintain grasp on L side of RW  Toilet Transfers  Toilet - Technique: Stand pivot  Equipment Used: Extra wide bedside commode  Toilet Transfer: Moderate assistance  Bed mobility  Supine to Sit: Minimal assistance  Sit to Supine: Contact guard assistance  Transfers  Sit to stand: Minimal assistance  Stand to sit: Minimal assistance  Transfer Comments: mod cues for safe hand placement sit<-->stand due to Left visual neglect                    Vision  Low Vision: Adaptations;Education  Patient Visual Report: Past pointing/reaching  Vision Comment: L sided visual neglect  Cognition  Overall Cognitive Status: Exceptions  Arousal/Alertness: Appropriate responses to stimuli  Following Commands:  Follows one step commands with increased time  Attention Span: Attends with cues to redirect  Memory: Appears intact  Safety Judgement: Decreased awareness of need for safety;Decreased awareness of need for assistance  Insights: Decreased awareness of deficits  Initiation: Requires cues for some  Sequencing: Requires cues for some     Perception  Overall Perceptual Status: Impaired  Unilateral Attention: Cues to attend left visual field;Cues to attend to left side of body                                   Plan   Plan  Times per week: 3-5x/ week  Current Treatment Recommendations: Strengthening, ROM, Balance Training, Functional Mobility Training, Safety Education & Training, Positioning, Self-Care / ADL, Neuromuscular Re-education, Cognitive/Perceptual Training, Patient/Caregiver Education & Training    AM-PAC Score        AM-[facility] Inpatient Daily Activity Raw Score: 14 (10/04/21 1521)  AM-PAC Inpatient ADL T-Scale Score : 33.39 (10/04/21 1521)  ADL Inpatient CMS 0-100% Score: 59.67 (10/04/21 1521)  ADL Inpatient CMS G-Code Modifier : CK (10/04/21 1521)    Goals  Short term goals  Time Frame for Short term goals: 1 week(10-10-21)-ongoing as of 10/04  Short term goal 1: min assist with LE self care by 10-10-21  Short term goal 2: min assist with functional/toilet transfers by 10-08-21  Short term goal 3: min assist with bathroom mobility with LRAD (Least Restrictive Assistive Device) by 10-08-21  Short term goal 4: supervision with 10 reps LUE self ROM/AROM  Patient Goals   Patient goals : go home when able       Therapy Time   Individual Concurrent Group Co-treatment   Time In 5654         Time Out 1500         Minutes 45         Timed Code Treatment Minutes: [address], OTR/L

## 2021-10-04 NOTE — PROGRESS NOTES
Charlotte BellaKitty Hawkra Bonnet   0'\"   10/4/21 10:00 AM   [phone] Hospital or Facility: [facility] From: [place] RE: babak nathan 1960 RM: [id number] Can she have new PT/OT orders, her current ones were on a different floor and PT/OT needs new ones to see her down here.  Thank you :) Need Callback: NO CALLBACK REQ CCU  Unread

## 2021-10-04 NOTE — PROGRESS NOTES
Physical Therapy    Facility/Department: [facility] C2 CARD TELEMETRY  Re evaluation and treatment    NAME: Reynaldo Adams  : 1960  MRN: [id number]    Date of Service: 10/4/2021    Discharge Recommendations:  IP Rehab   PT Equipment Recommendations  Equipment Needed: No  Other: defer to patient's facility    Assessment   Body structures, Functions, Activity limitations: Decreased functional mobility ; Decreased strength;Decreased endurance;Decreased vision/visual deficit; Decreased coordination;Decreased posture;Decreased ADL status; Decreased safe awareness;Decreased high-level IADLs;Decreased cognition;Decreased balance;Decreased fine motor control  Assessment: Pt re evaluated today per MD order after being transferred to ICU with critical K level. Pt is currently requiring mod A for bed mobility, CGA for sit<>stand and mod A for gait with RW 15 ft x 2. Pt with very slow processing and intermittently able to floolow simple commands, with decreased proprioception, visual field cut on left and significant left sided neglect. PT noted that pt was drooling out of left side of mouth and attempted to swallow approx 10 times, with 1 bout of coughing during that time. RN made aware and recommended Speech become involved. Pt would benefit from skilled acute PT to address deficits, and recommend IP rehab at [facility] 122 setting  Treatment Diagnosis: Decreased independence with functional mobility  Specific instructions for Next Treatment: progress mobility as tolerated  Prognosis: Good  Decision Making: Medium Complexity  PT Education: Goals; Functional Mobility Training;Precautions;PT Role;Pressure Relief; Injury Prevention;Plan of Care;Equipment;Gait Training;Home Exercise Program;General Safety; Low Vision Education;Disease Specific Education  Patient Education: Disease Specific Education: Patient educated on importance of OOB mobility, prevention of complications of bedrest, and general safety during hospitalization. Patient verbalized understanding  Barriers to Learning: left visual neglect, visual field cut, delayed processing. REQUIRES PT FOLLOW UP: Yes  Activity Tolerance  Activity Tolerance: Patient limited by fatigue;Patient limited by endurance; Patient limited by cognitive status  Activity Tolerance: /91 semireclined before activity; 139/96 after mobility sitting up in chair, assisted pt back to bed due to significant drop in BP, semireclined in bed after mobility 189/84; HR 82; SpO2 99% on RA       Patient Diagnosis(es): The primary encounter diagnosis was Cerebrovascular accident (CVA) due to occlusion of right middle cerebral artery ([address] Diagnoses of Peripheral visual field defect, left, Hypokalemia, and Hypertensive emergency were also pertinent to this visit. has a past medical history of Hypertension. has a past surgical history that includes Hysterectomy and Cholecystectomy. Restrictions  Restrictions/Precautions  Restrictions/Precautions: General Precautions, Fall Risk, Up as Tolerated  Position Activity Restriction  Other position/activity restrictions: telemetry, ICU monitoring  Vision/Hearing  Vision: Impaired  Vision Exceptions: Wears glasses at all times; Visual field cut (left visual field cut)  Hearing: Within functional limits     Subjective  General  Chart Reviewed: Yes  Patient assessed for rehabilitation services?: Yes  Additional Pertinent Hx: HPI per chart, \"56 y.o. female who presented to [facility] with past medical history of hypertension, class I obesity, hyperlipidemia, GERD presented to the ED with chief complaint of eye vision change and headache. Patient also reports that she has been dropping things on her left side that is new in addition to having left fifth finger numbness. CT Head: 1. Large acute/subacute stroke in the right middle cerebral arteryterritory. No hemorrhagic conversion. Stroke team was called and reported that the patient is not a candidate for mechanical thrombectomy or TPA. \"  Response To Previous Treatment: Not applicable  Family / Caregiver Present: Yes (son)  Referring Practitioner: Ramon Hernández MD  Referral Date : 10/04/21  Diagnosis: hypokalemia, found to have stroke with left side weakness and facial droop  Follows Commands: Impaired  Other (Comment): delayed processing and intermittently following commands  General Comment  Comments: Supine in bed upon entry. Cleared for therapy. Subjective  Subjective: Patient agreed to participate. Pain Screening  Patient Currently in Pain: Denies  Vital Signs  Patient Currently in Pain: Denies  Pre Treatment Pain Screening  Intervention List: Patient able to continue with treatment    Orientation  Orientation  Overall Orientation Status: Within Functional Limits  Social/Functional History  Social/Functional History  Lives With: Family (brother)  Type of Home: House  Home Layout: One level  Home Access: Stairs to enter with rails  Entrance Stairs - Number of Steps: 3  Entrance Stairs - Rails: Both  Bathroom Shower/Tub: Tub/Shower unit  Bathroom Toilet: Handicap height  Bathroom Equipment: Grab bars around toilet  Home Equipment: Cane, Standard walker, Reacher  ADL Assistance: Independent  Homemaking Assistance: Independent  Homemaking Responsibilities: Yes  Meal Prep Responsibility: Primary  Laundry Responsibility: Primary  Cleaning Responsibility: Primary  Shopping Responsibility: Primary  Ambulation Assistance: Independent (no AD)  Transfer Assistance: Independent  Active : Yes  Mode of Transportation: Car  Occupation: On disability  Type of occupation:  in [address]: walk, flea [employer]  Additional Comments: denies falls         Objective     Observation/Palpation  Posture: Fair  Observation: severe left sided neglect. left sided facial droop.     PROM RLE (degrees)  RLE PROM: WFL  AROM RLE (degrees)  RLE AROM: WFL  PROM LLE (degrees)  LLE PROM: WFL  AROM LLE (degrees)  LLE AROM : WFL  Strength RLE  Strength RLE: WFL  Comment: grossly 4+ to 5/5 throughout  Strength LLE  Comment: mild deficits, grossly 4 to 4+/5 throughout        Bed mobility  Supine to Sit: Minimal assistance  Sit to Supine: Unable to assess (up in chair at EOS)  Scooting: Minimal assistance (to EOB)  Transfers  Sit to Stand: Contact guard assistance  Stand to sit: Contact guard assistance  Bed to Chair: Moderate assistance (bed to toilet to chair and back to bed, using RW)  Ambulation  Ambulation?: Yes  Ambulation 1  Surface: level tile  Device: Rolling Walker  Assistance: Moderate assistance  Quality of Gait: No LOB or giving way of LE's but needed guidance of walker to avoid obstacles and verbal/tactile cueing to propel walker, therapist's physical movement of walker to attain ambulation goal, pt ambulating with feet in very left side of walker and at times with one foot outside of walker to the left. Pt unable to process simple problems, thought door was closed and preventing pt going through, but it was open, pt closed door and attempted to exit bathroom with door closed  Gait Deviations: Slow Rosalba;Decreased step length;Decreased step height  Distance: 15 ft x 2 bed to bathroom to chair  Comments: No complaints of shortness of breath, chest pain or dizziness.      Balance  Posture: Fair  Sitting - Static: Fair;+  Sitting - Dynamic: Fair;+  Standing - Static: Poor;+  Standing - Dynamic: Poor;+  Exercises  Straight Leg Raise: x 10 B  Quad Sets: x 10 B  Heelslides: x 10 B  Knee Short Arc Quad: x 10 B  Ankle Pumps: x 20 B     Plan   Plan  Times per week: 3-5/week  Plan weeks: 10/13/21  Specific instructions for Next Treatment: progress mobility as tolerated  Current Treatment Recommendations: Strengthening, Functional Mobility Training, Equipment Evaluation, Education, & procurement, ROM, Transfer Training, Gait Training, Safety Education & Training, Balance Training, ADL/Self-care Training, Endurance Training, Positioning, Patient/Caregiver Education & Training, Stair training  Safety Devices  Type of devices: All fall risk precautions in place, Call light within reach, Gait belt, Patient at risk for falls, Left in bed, Nurse notified, Bed alarm in place      AM-PAC Score     AM-PAC Inpatient Mobility without Stair Climbing Raw Score : 12 (10/04/21 1533)  AM-PAC Inpatient without Stair Climbing T-Scale Score : 37.26 (10/04/21 1533)  Mobility Inpatient CMS 0-100% Score: 63.03 (10/04/21 1533)  Mobility Inpatient without Stair CMS G-Code Modifier : CL (10/04/21 1533)       Goals  Short term goals  Time Frame for Short term goals: 1 week 10/13/21unless noted  Short term goal 1: Supine <> sit with supervision  Short term goal 2: Sit <> stand with CGA by 10/12  Short term goal 3: Bed <> chair with CGA  Short term goal 4: Ambulate 50 feet with LRAD and min assist.  Short term goal 5: By 10/6/21 Patient will tolerate 12-15 reps of her exercises to maximize her strength/endurance  Patient Goals   Patient goals : To become stronger. Therapy Time   Individual Concurrent Group Co-treatment   Time In 1011         Time Out 1053         Minutes 42         Timed Code Treatment Minutes: 28 Minutes    If pt is discharged prior to next therapy session, this note will serve as discharge summary.     Parul Waller, PT

## 2021-10-04 NOTE — CONSULTS
In patient Neurology consult        [facility] Neurology      MD Brayan Billy Ear  1960    Date of Service: 10/4/2021    Referring Physician: Jayne Telles MD      Reason for the consult and CC: Acute left-sided weakness, confusion and possible stroke. HPI:   The patient is a 64y.o.  years old female with history of hypertension, hyperlipidemia and obesity who was admitted to the hospital over the weekend with a new onset visual disturbance with confusion. Symptoms started 2 to 3 days ago. Description was sudden confusion at home and not following direction followed by speech impairment. She was observed by family to have difficulties from her left eye with neglecting of her left side. Degree was severe. Duration was persistent. No falling or injury. No clear triggers. No chest pain or headache or recent fever. No other relieving or aggravating factors. Blood pressure was around 160 at home. She came to the ED for evaluation. She was eventually admitted. Initial work-up revealed a right MCA stenosis and occlusion otherwise no acute stroke. She was admitted to the ICU. Today she is about the same. Other review of system was unremarkable.     Family history: Noncontributory      Past Surgical History:   Procedure Laterality Date    CHOLECYSTECTOMY      HYSTERECTOMY          Past Medical History:   Diagnosis Date    Hypertension      Social History     Tobacco Use    Smoking status: Current Some Day Smoker     Packs/day: 0.50     Years: 40.00     Pack years: 20.00     Types: Cigarettes     Start date: 36    Smokeless tobacco: Never Used    Tobacco comment: only have smoked a couple cig since hospital   Substance Use Topics    Alcohol use: No    Drug use: No     Allergies   Allergen Reactions    Bee Pollen Swelling    Lisinopril Nausea Only    Varenicline Nausea And Vomiting     Current Facility-Administered Medications   Medication Dose Route Frequency Provider constitutional, cardiovascular, respiratory, eyes, musculoskeletal, endocrine, GI, ENT, skin, hematological, genitourinary, psychiatric and neurologic systems was obtained and updated today and is unremarkable except as mentioned in my HPI      Exam:     Constitutional:   Vitals:    10/04/21 0600 10/04/21 0700 10/04/21 0816 10/04/21 1100   BP: (!) 181/88 (!) 220/85 (!) 212/69 (!) 181/74   Pulse: 55 59 66 61   Resp: 10 13 14 15   Temp:   98.8 °F (37.1 °C) 98.2 °F (36.8 °C)   TempSrc:   Axillary Axillary   SpO2:   97% 95%   Weight:       Height:           General appearance: Confused  Eye:  Fundus of the eye: Funduscopic examination cannot be performed due to COVID19 restrictions and precautions. Neck: supple  Cardiovascular: No lower leg edema with good pulsation. Mental Status:   Oriented to person only but not to place or time  Waxing and waning with poor attention concentration  Speech is nonfluent with paraphasic error  Immediate recall is impaired  Text remote memory  Poor fund of knowledge  Cranial Nerves:   II: Visual fields: Left-sided neglect. Pupils: equal, round, reactive to light  III,IV,VI: Extra Ocular Movements are intact. No nystagmus  V: Facial sensation is intact   VII: Facial strength and movements: intact and symmetric  VIII: Hearing: Intact  IX: Palate elevation is symmetric  XI: Shoulder shrug is intact  XII: Tongue movements are normal  Musculoskeletal: Left-sided weakness 4/5  Tone: Normal tone. Reflexes: 2+ in the upper extremity and 2+ in the lower extremity   Planters: flexor bilaterally. Coordination: Left pronator drift, no dysmetria with FNF in upper extremities. Normal REM.    Sensation: Left-sided neglect  Gait/Posture: Not tested due to confusion weakness    Data:  LABS:   Lab Results   Component Value Date     10/04/2021    K 4.2 10/04/2021     10/04/2021    CO2 23 10/04/2021    BUN 6 10/04/2021    CREATININE 0.6 10/04/2021    GFRAA >60 10/04/2021    LABGLOM >60 10/04/2021    GLUCOSE 119 10/04/2021    PHOS 3.6 03/03/2021    MG 2.30 10/03/2021    CALCIUM 9.2 10/04/2021     Lab Results   Component Value Date    WBC 12.4 10/04/2021    RBC 4.84 10/04/2021    HGB 14.4 10/04/2021    HCT 41.7 10/04/2021    MCV 86.1 10/04/2021    RDW 13.7 10/04/2021     10/04/2021     Lab Results   Component Value Date    INR 1.07 10/02/2021    PROTIME 12.1 10/02/2021       Neuroimaging were independently reviewed by me and discussed results with the patient and son  Reviewed notes from different physicians  Reviewed lab and blood testing    Impression:  Acute confusion with left-sided neglect and visual field deficit likely consistent with new ischemic right MCA stroke. Awaiting MRI. Could be thromboembolic from MCA stenosis and occlusion versus cardioembolic. Hypertension, not controlled  Hyperlipidemia  Hyperglycemia    Recommendation:  MRI brain  Echo  Speech evaluation  Aspiration precautions  PT and OT  Telemetry  DVT and GI prophylaxis  Blood pressure monitor and control. Avoid blood pressure below 160/90 for now. Aspirin  Statin  Lipid panel  Insulin sliding scale  Stroke education, secondary prevention and risk of recurrence was discussed today with the patient and her son  We will follow    MDM: High  Discussed with ICU nurse. Thank you for referring such patient. If you have any questions regarding my consult note, please don't hesitate to call me. Cortney John MD  [phone]    This dictation was generated by voice recognition computer software.  Although all attempts are made to edit the dictation for accuracy, there may be errors in the  transcription that are not intended

## 2021-10-04 NOTE — PLAN OF CARE
Problem: Neurological  Intervention: Speech Evaluation/treatment  Note: Patient educated on role of ST. Please see ST evaluation and treatment notes for specific information re: plan of care, interventions provided and progress toward goals. Problem: Nutrition  Intervention: Swallowing evaluation  Note: Patient educated on role of ST. Please see ST evaluation and treatment notes for specific information re: plan of care, interventions provided and progress toward goals. Problem: Nutrition  Intervention: Aspiration precautions  Note: Patient educated on role of ST. Please see ST evaluation and treatment notes for specific information re: plan of care, interventions provided and progress toward goals.

## 2021-10-04 NOTE — CARE COORDINATION
Consult received for inpatient rehabilitation. She is being considered for a possible angelia admission as she does not have insurance. Dr. Meza Na to round on patient tomorrow morning and make final decision. Thank you.    Dayna Hudson M.A, BRADLY-SLP/ CBIS   Clinical Liason

## 2021-10-04 NOTE — PROGRESS NOTES
Speech Language Pathology  Facility/Department: [facility] C2 CARD TELEMETRY  Initial Speech/Language/Cognitive Assessment    NAME: Dionte Roper  : 1960   MRN: [id number]  ADMISSION DATE: 10/2/2021  ADMITTING DIAGNOSIS: has Hypertensive emergency; Hypomagnesemia; Syncope; Smoker; Hypokalemia; Thyroid nodule; Hypertensive urgency; CVA (cerebral vascular accident) ([address] HTN (hypertension), benign; Dyslipidemia; GERD (gastroesophageal reflux disease); Left lumbar radiculitis; Lumbar spondylosis; Myofascial muscle pain; Tobacco use disorder; SOB (shortness of breath); Dizziness; Acute ischemic stroke ([address] and Peripheral visual field defect, left on their problem list.  DATE ONSET: 10/2/2021    Date of Eval: 10/4/2021   Evaluating Therapist: CLAUDIA Choudhary    RECENT RESULTS  CT OF HEAD/MRI: pending    Primary Complaint:   MD H&P: \"56 y.o. female who presented to [facility] with past medical history of hypertension, class I obesity, hyperlipidemia, GERD presented to the ED with chief complaint of eye vision change and headache.     Patient reported that she has had eye problem going on for years have seen ophthalmology and was told that she has a lot of floaters in her left eye with nothing that can be done at this time. Patient reported there has not been change in regards to that side however has gotten worse with her severe headache that localized to the  sinus and frontal radiating to the back worsened with cough. Patient reports that her blood pressure is normally runs around 160s at home and has been taking her home medications daily. Patient reported that she did have a strokelike symptoms back in March where she underwent an MRI. Patient also reports that she has been dropping things on her left side that is new in addition to having left fifth finger numbness.   Discussed and the patient is a full code\"    Pain:  Pain Assessment  Pain Assessment: 0-10  Pain Level: 3  Patient's Stated Pain Goal: No pain  Pain Type: Acute pain  Pain Location: Head  Pain Orientation: Right  Pain Descriptors: Aching  Pain Frequency: Continuous  Pain Onset: Progressive  Clinical Progression: Gradually worsening  Functional Pain Assessment: Prevents or interferes some active activities and ADLs  Non-Pharmaceutical Pain Intervention(s): Repositioned  Response to Pain Intervention: Patient Satisfied    Assessment:  Cognitive Diagnosis: Moderate-Severe Cognitive-Linguistic Impairment  Speech Diagnosis: Mild dysarthria, progressed to moderate when fatigued      Recommendations:  Requires SLP Intervention: Yes  Duration/Frequency of Treatment: 3-5x/wk for 5 days  D/C Recommendations: Inpatient rehabilitation;24 hour supervision/assistance  Referral To: Speech Evaluation    Plan:   Goals:  Short-term Goals  Timeframe for Short-term Goals: 3 days (10/7/64578)  Goal 1: Pt will complete verbal reasoning and problem solving tasks with 80% acc given min cues`  Goal 2: Pt will complete visual scanning tasks at phrase-sentence level with 80% acc given min cues  Goal 3: Pt will achieve 100% speech intelligibility in conversational exchanges using strategies (slowed rate, exaggerated articulation, increased volume) with min cues  Long-term Goals  Timeframe for Long-term Goals: 5 days (10/9/2021)  Goal 1: Pt will improve cognitive-linguistic skills to increase safety and independence at next level of care. Goal 2: Pt will use strategies to improve speech intelligibility to a level functional to participate in conversation with all listeners. Patient/family involved in developing goals and treatment plan:    Subjective:  General  Chart Reviewed: Yes  Patient assessed for rehabilitation services?: Yes  Additional Pertinent Hx: Hx CVA 03/2021 with residual memory and visuo-perceptual impairment. Family / Caregiver Present: No  General Comment  Comments: Ok to see per RN. Subjective  Subjective: Wears upper denture, not present.  Reports therapy  Managing Medications: To be assessed in therapy  Performing Discharge Planning: To be assessed in therapy  Simple Functional Tasks: To be assessed in therapy  Verbal Reasoning Skills: Severe  Numeric Reasoning  Numeric Reasoning: Exceptions to [facility]   Calculations: Moderate  Money Management: Severe  Time: Mild  Safety/Judgement  Safety/Judgement: Exceptions to [facility]  Unable to Self-monitor and Self-correct Consistently: Moderate  Insight: Moderate             Prognosis:  Speech Therapy Prognosis  Prognosis: Good  Prognosis Considerations: Co-Morbidities;Previous Level of Function;Severity of Impairments  Individuals consulted  Consulted and agree with results and recommendations: Patient    Education:  Patient Education: Role of SLP in overall plan of care, diet texture recs  Patient Education Response: Verbalizes understanding;Needs reinforcement  Safety Devices in place: Yes  Type of devices: All fall risk precautions in place; Left in bed;Bed alarm in place;Nurse notified    Therapy Time:   Individual Concurrent Group Co-treatment   Time In 1125         Time Out 1135         Minutes 10               Krystyna Evans MS CCC-SLP  Speech Language Pathologist     [place], [address] [place] [address] [address]  10/4/2021 2:02 PM

## 2021-10-04 NOTE — CARE COORDINATION
Care being managed by Joa[facility] Pineda. LOS 2. Pt has no insurance listed at this time- Financial counselor is looking into. Pt is from home w brother. Therapy recommends ARU- they are tentatively following. CM will continue to follow. Jaylon Orellana RN     [id number] Discussed w Dr Araseli Ames- in agreement that ARU referral is appropriate. Discussed also with pt- agreeable to referral. Order placed and call to ARU admissions. CM will continue to follow.  Jaylon Orellana RN

## 2021-10-05 NOTE — PROGRESS NOTES
Physical Therapy  Facility/Department: [facility] C5 - MED SURG/ORTHO  Daily Treatment Note  NAME: Briana Henley  : 1960  MRN: [id number]    Date of Service: 10/5/2021    Discharge Recommendations:  IP Rehab   PT Equipment Recommendations  Equipment Needed: No  Other: defer to patient's facility  If pt discharges prior to next PT session this note will serve as discharge summary. Assessment   Body structures, Functions, Activity limitations: Decreased functional mobility ; Decreased strength;Decreased endurance;Decreased vision/visual deficit; Decreased coordination;Decreased posture;Decreased ADL status; Decreased safe awareness;Decreased high-level IADLs;Decreased cognition;Decreased balance;Decreased fine motor control  Assessment: Pt seen for BLE ex, bed mob and transfers today. Treatment limited by lethargy, decreased safety awareness, L neglect, difficutlty following instructions. Decreased AM PAC score today reflects these limitations today. Pt's sister present and states pt was awake, alert and talkative earlier but thinks pt had medication which may have made her sleepy. Report given to RN post treatment and RN states she would go in to reassess pt. Pt will benefit from skilled PT to address deficits. Current recommendation is for IP Rehab upon discharge  Treatment Diagnosis: Decreased independence with functional mobility  Specific instructions for Next Treatment: progress mobility as tolerated  Prognosis: Good  PT Education: Goals; Functional Mobility Training;Precautions;PT Role;Pressure Relief; Injury Prevention;Plan of Care;Equipment;Gait Training;Home Exercise Program;General Safety; Low Vision Education;Disease Specific Education  Patient Education: Pt and her sisted educated in role of therapy, L neglect, safety awareness.  Pt does not show evidence of learning today due to lethargy, sister states understanding  Barriers to Learning: left visual neglect, visual field cut, delayed processing, lethargy  REQUIRES PT FOLLOW UP: Yes  Activity Tolerance  Activity Tolerance: Patient limited by fatigue;Patient limited by endurance; Patient limited by cognitive status  Activity Tolerance: /104  HR 62  SpO2 96%     Patient Diagnosis(es): The primary encounter diagnosis was Cerebrovascular accident (CVA) due to occlusion of right middle cerebral artery ([address] Diagnoses of Peripheral visual field defect, left, Hypokalemia, and Hypertensive emergency were also pertinent to this visit. has a past medical history of Hypertension. has a past surgical history that includes Hysterectomy and Cholecystectomy. Restrictions  Restrictions/Precautions: General Precautions, Fall Risk, Up as Tolerated  Other position/activity restrictions: telemetry  Subjective   Chart Reviewed: Yes  Additional Pertinent Hx: HPI per chart, \"56 y.o. female who presented to [facility] with past medical history of hypertension, class I obesity, hyperlipidemia, GERD presented to the ED with chief complaint of eye vision change and headache. Patient also reports that she has been dropping things on her left side that is new in addition to having left fifth finger numbness. CT Head: 1. Large acute/subacute stroke in the right middle cerebral arteryterritory. No hemorrhagic conversion. Stroke team was called and reported that the patient is not a candidate for mechanical thrombectomy or TPA. \"  Response To Previous Treatment: Patient with no complaints from previous session. Family / Caregiver Present: Yes  Referring Practitioner: Nancy Avendaño MD  Subjective: Patient agreed to participate. Pt lethargic, falling asleep during session, difficulty following instruction  Comments: Supine in bed upon entry. Cleared for therapy.   Pain Screening  Patient Currently in Pain: Denies  Vital Signs- see activity tolerance section above  Patient Currently in Pain: Denies       Orientation  Orientation  Overall Orientation Status: Within Functional Limits     Objective   Bed mobility  Supine to Sit: Minimal assistance  Sit to Supine: Minimal assistance  Transfers  Sit to Stand: Moderate Assistance  Stand to sit: Moderate Assistance  Comment: Transfers performed from [facility] to [facility] due to lethargy , decreased safety awareness. Pt falling asleep during session  Ambulation  Ambulation?: No (unable today due to lethargy and decreased safety awareness)   Exercises  Straight Leg Raise: 10 x B assisted  Heelslides: 10 x B assisted  Gluteal Sets: 12 x B  Hip Abduction: 10 x B assisted  Ankle Pumps: 12 x B      AM-PAC Score     AM-PAC Inpatient Mobility without Stair Climbing Raw Score : 9 (10/05/21 1058)  AM-PAC Inpatient without Stair Climbing T-Scale Score : 32.44 (10/05/21 1058)  Mobility Inpatient CMS 0-100% Score: 76.07 (10/05/21 1058)  Mobility Inpatient without Stair CMS G-Code Modifier : CL (10/05/21 1058)       Goals  Short term goals  Time Frame for Short term goals: 1 week 10/13/21unless noted  Short term goal 1: Supine <> sit with supervision- 10/5 min assist  Short term goal 2: Sit <> stand with CGA by 10/12- 1/5 mod assist in STEDY  Short term goal 3: Bed <> chair with CGA- 10/5 not attempted due to pt lethargy and decreased safety  Short term goal 4: Ambulate 50 feet with LRAD and min assist.- 10/5 Not attempted due to pt lethargy and decreased safety  Short term goal 5: By 10/6/21 Patient will tolerate 12-15 reps of her exercises to maximize her strength/endurance- 10/5 pt tolerated 10-12 reps LE ex  Patient Goals   Patient goals : To become stronger.     Plan    Times per week: 3-5/week  Plan weeks: 10/13/21  Specific instructions for Next Treatment: progress mobility as tolerated  Current Treatment Recommendations: Strengthening, Functional Mobility Training, Equipment Evaluation, Education, & procurement, ROM, Transfer Training, Gait Training, Safety Education & Training, Balance Training, ADL/Self-care Training, Endurance Training, Positioning, Patient/Caregiver Education & Training, Stair training  Safety Devices  Type of devices:  All fall risk precautions in place, Call light within reach, Gait belt, Patient at risk for falls, Left in bed, Nurse notified, Bed alarm in place     Therapy Time   Individual Concurrent Group Co-treatment   Time In [facility]         Time Out 0950         Minutes [id number] [facility],

## 2021-10-05 NOTE — PROGRESS NOTES
PT made this RN aware that pt appears more lethargic, unable to follow commands. NIH scale completed and pt scored higher than this am (8 this am). Will page MD Loreto to make aware of new changes. NIH Stroke Scale  NIH Stroke Scale Assessed: Yes  Interval: Reassessment  Level of Consciousness (1a. ): Alert  LOC Questions (1b. ):  Answers one correctly  LOC Commands (1c. ): Performs both tasks correctly  Best Gaze (2. ): Normal  Visual (3. ): (!) Complete hemianopia  Facial Palsy (4. ): (!) Minor paralysis  Motor Arm, Left (5a. ): Drift, but does not hit bed  Motor Arm, Right (5b. ): No drift  Motor Leg, Left (6a. ): Drift, but does not hit bed  Motor Leg, Right (6b. ): No drift  Limb Ataxia (7. ): (!) Present in one limb  Sensory (8. ): (!) Mild to Moderate  Best Language (9. ): Mild to moderate aphasia  Dysarthria (10. ): Mild to moderate, slurs some words  Extinction and Inattention (11): No abnormality  Total: 10

## 2021-10-05 NOTE — PROGRESS NOTES
Hospitalist Progress Note      PCP: Naa Sofia, APRN - CNP    Date of Admission: 10/2/2021    Chief Complaint: vision changes       Subjective:  Called to bedside today because she was drowsier. She did seem cognitively slower and less alert. She was able to easily wake up and answer questions appropriately, just less aware and talkative than yesterday. Eyes mostly closed. Focal neurological deficits remain about the same. Due to the size of her infarct I think it makes sense to keep her inpatient for another day and watch for dramatic worsening. Otherwise ARU tomorrow. Medications:  Reviewed    Infusion Medications   Scheduled Medications    chlorthalidone  25 mg Oral Daily    amLODIPine  10 mg Oral Daily    carvedilol  12.5 mg Oral BID WC    aspirin  81 mg Oral Daily    Or    aspirin  300 mg Rectal Daily    atorvastatin  80 mg Oral Nightly    covid-19 vaccine  1 Dose IntraMUSCular Prior to discharge     PRN Meds: labetalol, HYDROcodone 5 mg - acetaminophen, potassium chloride, magnesium sulfate, promethazine **OR** ondansetron, acetaminophen **OR** acetaminophen, polyethylene glycol, perflutren lipid microspheres    No intake or output data in the 24 hours ending 10/05/21 1426    Physical Exam Performed:    BP (!) 185/79   Pulse 65   Temp 99.2 °F (37.3 °C) (Oral)   Resp 18   Ht 5' 4\" (1.626 m)   Wt 186 lb 1.1 oz (84.4 kg)   SpO2 97%   BMI 31.94 kg/m²     General appearance: No apparent distress, appears stated age and cooperative. HEENT: Pupils equal, round, and reactive to light. Conjunctivae/corneas clear. Neck: Supple, with full range of motion. No jugular venous distention. Trachea midline. Respiratory:  Normal respiratory effort. Clear to auscultation, bilaterally without Rales/Wheezes/Rhonchi. Cardiovascular: Regular rate and rhythm with normal S1/S2 without murmurs, rubs or gallops.   Abdomen: Soft, non-tender, non-distended with normal bowel sounds. Musculoskeletal: No clubbing, cyanosis or edema bilaterally. Full range of motion without deformity. Skin: Skin color, texture, turgor normal.  No rashes or lesions. Neurologic:  She still has the L visual field defect. She has a weak L  and L hand paresthesias and apraxia. Psychiatric: drowsy, fully oriented, thought content appropriate, has insight. She does seem to have some subtle cognitive impairment. Capillary Refill: Brisk,3 seconds, normal   Peripheral Pulses: +2 palpable, equal bilaterally       Labs:   Recent Labs     10/02/21  1908 10/03/21  0646 10/04/21  0437   WBC 13.9* 9.8 12.4*   HGB 14.3 13.5 14.4   HCT 40.7 39.5 41.7    166 241     Recent Labs     10/02/21  1908 10/03/21  0546 10/03/21  1225 10/03/21  1650 10/04/21  0436   NA  --  139 137  --  137   K   < > 2.6* 2.9* 3.6 4.2   CL  --  102 97*  --  104   CO2  --  23 29  --  23   BUN  --  6* 5*  --  6*   CREATININE  --  0.7 0.8  --  0.6   CALCIUM  --  8.4 8.6  --  9.2    < > = values in this interval not displayed. Recent Labs     10/02/21  1908 10/03/21  0546 10/04/21  0436   AST 17 15 17   ALT 7* <5* 7*   BILITOT 1.1* 1.2* 1.5*   ALKPHOS 88 79 87     Recent Labs     10/02/21  1908   INR 1.07     Recent Labs     10/02/21  1908 10/02/21  2333   TROPONINI <0.01 <0.01       Urinalysis:      Lab Results   Component Value Date    NITRU Negative 10/02/2021    WBCUA 21-50 10/02/2021    BACTERIA 2+ 10/02/2021    RBCUA 3-4 10/02/2021    BLOODU SMALL 10/02/2021    SPECGRAV <=1.005 10/02/2021    GLUCOSEU Negative 10/02/2021       Radiology:  MRI BRAIN WO CONTRAST   Preliminary Result   Unchanged subacute right MCA infarct. XR CHEST PORTABLE   Final Result   No acute cardiopulmonary abnormality. CTA HEAD NECK W CONTRAST   Final Result   1. Occlusion of the right middle cerebral artery at the origin may be acute.    Overall decreased size and number of enhancing right middle cerebral artery   branches in comparison to the left. This correlates with findings of large   acute/subacute right middle cerebral artery territory infarct. 2.  Short segment high-grade stenosis of a left middle cerebral artery M2   branch. Mild-to-moderate stenosis in the left middle cerebral artery M1   segment. Diffuse atherosclerotic irregularity of the more distal left middle   cerebral artery branches. 3.  The bilateral posterior cerebral arteries are diffusely small and not   well seen, underlying disease is not excluded. 4.  No hemodynamically significant stenosis in the bilateral cervical   internal carotid arteries per NASCET criteria. Bilateral carotid bulb   atherosclerotic plaque. 5.  Patent bilateral vertebral arteries. Critical results were called by Dr. Robert Fiore to Dr. Brenna Echevarria on   10/2/2021 at 19:49. This scan was analyzed using [employer]. ai contact LVO. Identification of suspected   findings is not for diagnostic use beyond notification. Viz LVO is limited to   analysis of imaging data and should not be used in-lieu of full patient   evaluation or relied upon to make or confirm diagnosis. CT HEAD WO CONTRAST   Final Result   1. Large acute/subacute stroke in the right middle cerebral artery   territory. No hemorrhagic conversion. 2.  Sulcal effacement throughout the right cerebral hemisphere with partial   effacement of the right lateral and 3rd ventricles and minimal right left   midline shift measuring 2 mm. 3.  Chronic small vessel ischemic disease. Old left thalamic and basal   ganglia lacunar infarcts are similar to the prior study. Critical results were called by Dr. Robert Fiore to Dr. Brenna Echevarria on   10/2/2021 at 19:29.                  Assessment/Plan:    Active Hospital Problems    Diagnosis     Peripheral visual field defect, left [H53.452]     Acute ischemic stroke ([address] [I63.9]     Hypertensive emergency [I16.1]        64 Y F with a h/o smoking, obesity, HTN, and HLD presented with one day of left-sided vision changes, dropping things with her left hand, and LUE paresthesias. Large RMCA subacute ischemic stroke  - probably thromboembolic. Her CTA showed an occlusion of the origin of the RMCA, also significant stenoses of the LMCA. - aspirin, statin  - PT/OT  - appreciate neurology input    Severe HTN  - eventual BP control. OK to let it gradually come down over the next few days. - her amlodipine and carvedilol were resume over the weekend. BP stayed very high. Added chlorthalidone. HLD. Statin. Obesity. Encouraged diet and exercise. DVT Prophylaxis: enoxaparin  Diet: ADULT DIET; Dysphagia - Soft and Bite Sized; 3 carb choices (45 gm/meal); Low Fat/Low Chol/High Fiber/2 gm Na  Code Status: Full Code    PT/OT Eval Status: rec'd IPR    Dispo - suspect that she will be ready for the ARU on 10/6.        Miko Vazquez MD

## 2021-10-05 NOTE — PROGRESS NOTES
Pt began chocking with this RN was in room crushing evening medication. Pt was found to be pocketing food on left side. This RN removed food from mouth, will keep pt NPO.

## 2021-10-05 NOTE — PROGRESS NOTES
Speech Language Pathology    SLP attempted dysphagia and speech/lang/cog f/u, spoke with RN. RN reported pt had left-sided labial residue after PO earlier this date. Pt sleeping upon SLP entry, only briefly opening eyes to speak with SLP. Pt does not demonstrate adequate MATT for f/u at this time. SLP wrote reminder on pt's whiteboard in room to check for possible left-sided pocketing and / or labial residue during meals. Pt will require 1:1 assistance / cues during meals. ST to continue to follow.   No charges    Tiffanie Ellsworth M.S. [id number] [facility]  Speech-language pathologist  [id number]  Speech Desk Phone: [phone]

## 2021-10-05 NOTE — PROGRESS NOTES
Yancy Robert  Neurology Follow-up  [facility] Neurology    Date of Service: 10/5/2021    Subjective:   CC: Follow up today regarding: Acute left-sided weakness, confusion, and acute CVA    Events noted. Chart and lab reviewed. Left-sided neglect noted. Family at bedside. ROS : A 10-12 system review obtained and updated today and is unremarkable except as mentioned  in my interval history. family history is not on file.     Past Medical History:   Diagnosis Date    Hypertension      Current Facility-Administered Medications   Medication Dose Route Frequency Provider Last Rate Last Admin    labetalol (NORMODYNE;TRANDATE) injection 10 mg  10 mg IntraVENous Q4H PRN Pollo Sousa MD        chlorthalidone (HYGROTON) tablet 25 mg  25 mg Oral Daily Pollo Sousa MD   25 mg at 10/05/21 0935    amLODIPine (NORVASC) tablet 10 mg  10 mg Oral Daily Cecilia Meléndez MD   10 mg at 10/05/21 0935    HYDROcodone-acetaminophen ([address]) 5-325 MG per tablet 1 tablet  1 tablet Oral Q6H PRN Cecilia Meléndez MD   1 tablet at 10/03/21 1406    carvedilol (COREG) tablet 12.5 mg  12.5 mg Oral BID WC Cecilia Meléndez MD   12.5 mg at 10/05/21 0935    potassium chloride 10 mEq/100 mL IVPB (Peripheral Line)  10 mEq IntraVENous PRN Lonita Norse, DO        magnesium sulfate 2000 mg in 50 mL IVPB premix  2,000 mg IntraVENous PRN Charlene [place], DO        promethazine (PHENERGAN) tablet 12.5 mg  12.5 mg Oral Q6H PRN Kevin Pichardo, DO        Or    ondansetron (ZOFRAN) injection 4 mg  4 mg IntraVENous Q6H PRN Charlene [place], DO        acetaminophen (TYLENOL) tablet 650 mg  650 mg Oral Q6H PRN Charlene [place], DO   650 mg at 10/03/21 2023    Or    acetaminophen (TYLENOL) suppository 650 mg  650 mg Rectal Q6H PRN Ahmad A [place]ino, DO        polyethylene glycol (GLYCOLAX) packet 17 g  17 g Oral Daily PRN Lonita Norse, DO        aspirin EC tablet 81 mg  81 mg Oral Daily Ahmad LAZARO Charleszi, DO   81 mg at 10/05/21 0935    Or  aspirin suppository 300 mg  300 mg Rectal Daily Kevin WILLIS DO Marino        perflutren lipid microspheres (DEFINITY) injection 1.65 mg  1.5 mL IntraVENous ONCE PRN Perla WILLIS MarinoDO        atorvastatin (LIPITOR) tablet 80 mg  80 mg Oral Nightly Kevin Pichardo    80 mg at 10/04/21 2217    COVID-19 Ad26 Vaccine (J&J, Quietyme) injection 0.5 mL  1 Dose IntraMUSCular Prior to discharge Perla PichardoDO         Allergies   Allergen Reactions    Bee Pollen Swelling    Lisinopril Nausea Only    Varenicline Nausea And Vomiting      reports that she has been smoking cigarettes. She started smoking about 41 years ago. She has a 20.00 pack-year smoking history. She has never used smokeless tobacco. She reports that she does not drink alcohol and does not use drugs. Objective:  Exam:   Constitutional:   Vitals:    10/04/21 2215 10/05/21 0245 10/05/21 0817 10/05/21 1056   BP: (!) 213/101 (!) 191/91 (!) 151/100 (!) 150/104   Pulse: 67 66 63 62   Resp: 18 16 16 18   Temp: 97.4 °F (36.3 °C)  98.1 °F (36.7 °C)    TempSrc: Axillary Oral Oral    SpO2: 100% 99% 99% 96%   Weight:       Height:         General appearance:  Normal development and appear in no acute distress. Mental Status:   Oriented to person, place, problem, and time. Intact remote memory. Poor attention span and concentration. Language: intact naming, repeating and fluency   Poor fund of Knowledge. Cranial Nerves:   II: Visual fields: Left-sided neglect. Pupils: equal, round, reactive to light  III,IV,VI: Extra Ocular Movements are intact. No nystagmus  V: Facial sensation is intact  VII: Facial strength and movements: left facial droop noted. IX: Palate elevation is symmetric  XI: Shoulder shrug is intact  XII: Tongue movements are normal  Musculoskeletal: LUE and LLE 4/5. Tone: Normal tone. Reflexes: Symmetric 2+ in both arms and legs.   Coordination: left pronator drift, no dysmetria with FNF  Sensation: left sided neglect  Gait/Posture: did not test gait. Data:  LABS:   Lab Results   Component Value Date     10/04/2021    K 4.2 10/04/2021     10/04/2021    CO2 23 10/04/2021    BUN 6 10/04/2021    CREATININE 0.6 10/04/2021    GFRAA >60 10/04/2021    LABGLOM >60 10/04/2021    GLUCOSE 119 10/04/2021    PHOS 3.6 03/03/2021    MG 2.30 10/03/2021    CALCIUM 9.2 10/04/2021     Lab Results   Component Value Date    WBC 12.4 10/04/2021    RBC 4.84 10/04/2021    HGB 14.4 10/04/2021    HCT 41.7 10/04/2021    MCV 86.1 10/04/2021    RDW 13.7 10/04/2021     10/04/2021     Lab Results   Component Value Date    INR 1.07 10/02/2021    PROTIME 12.1 10/02/2021       Neuroimaging was independently reviewed by me and discussed results with the patient  I reviewed blood testing and other test results and discussed results with the patient      Impression:    Acute confusion with left-sided neglect and visual field deficit consistent with new ischemic right MCA stroke, likely thromboembolic from MCA stenosis and occlusion. ECHO reviewed, no PFO or thrombus. Hypertension, not controlled  Hyperlipidemia  Prediabetes, A1c 5.7. Recommendation    Monitor on tele. Continue ASA and statin. Continue BP meds. Goal BP is 160/90 for now. Avoid hypotension. PT/OT/SLP  Good candidate for ARU. Madeleine Vasquez CNP      This dictation was generated by voice recognition computer software. Although all attempts are made to edit the dictation for accuracy, there may be errors in the transcription that are not intended.

## 2021-10-05 NOTE — CONSULTS
Patient: Sukh Minor  [id number]  Date: 10/5/2021      Chief Complaint: stroke    History of Present Illness/Hospital Course:  Ms Monroe Narvaez is a 64year old female admitted 10/2/2021 with left sided vision changes, left hand weakness, and paresthesias. Evaluation revealed a R MCA occlusion and L MCA stenosis. MRI c/w subacute right MCA infarct. Discussed rehab with patient this morning; she was agreeable.      has a past medical history of Hypertension. has a past surgical history that includes Hysterectomy and Cholecystectomy. reports that she has been smoking cigarettes. She started smoking about 41 years ago. She has a 20.00 pack-year smoking history. She has never used smokeless tobacco. She reports that she does not drink alcohol and does not use drugs. family history is not on file. REVIEW OF SYSTEMS:   CONSTITUTIONAL: negative for fevers, chills, diaphoresis, activity change, appetite change, fatigue, night sweats and unexpected weight change.    EYES: negative for blurred vision, eye discharge, visual disturbance and icterus  HEENT: negative for hearing loss, tinnitus, ear drainage, sinus pressure, nasal congestion, epistaxis and snoring  RESPIRATORY: Negative for hemoptysis, cough, sputum production  CARDIOVASCULAR: negative for chest pain, palpitations, exertional chest pressure/discomfort, edema, syncope  GASTROINTESTINAL: negative for nausea, vomiting, diarrhea, constipation, blood in stool and abdominal pain  GENITOURINARY: negative for frequency, dysuria, urinary incontinence, decreased urine volume, and hematuria  HEMATOLOGIC/LYMPHATIC: negative for easy bruising, bleeding and lymphadenopathy  ALLERGIC/IMMUNOLOGIC: negative for recurrent infections, angioedema, anaphylaxis and drug reactions  ENDOCRINE: negative for weight changes and diabetic symptoms including polyuria, polydipsia and polyphagia  MUSCULOSKELETAL: negative for pain, joint swelling, decreased range of motion and muscle weakness  NEUROLOGICAL: negative for headaches, slurred speech, unilateral weakness  PSYCHIATRIC/BEHAVIORAL: negative for hallucinations, behavioral problems, confusion and agitation. Physical Examination:  Vitals: Patient Vitals for the past 24 hrs:   BP Temp Temp src Pulse Resp SpO2   10/05/21 1056 (!) 150/104 -- -- 62 18 96 %   10/05/21 0817 (!) 151/100 98.1 °F (36.7 °C) Oral 63 16 99 %   10/05/21 0245 (!) 191/91 -- Oral 66 16 99 %   10/04/21 2215 (!) 213/101 97.4 °F (36.3 °C) Axillary 67 18 100 %   10/04/21 1840 (!) 194/105 97.7 °F (36.5 °C) Oral 67 18 98 %   10/04/21 1600 (!) 213/97 -- -- 66 -- --   10/04/21 1500 (!) 177/116 98.8 °F (37.1 °C) Oral 71 20 97 %     Mood: Stable  Const: No distress  ENT: Oral mucosa moist  Eyes: No discharge or injection  CV: Regular rate and rhythm, no murmur rub or gallop noted  Resp: Lungs clear to auscultation bilaterally, no rales wheezes or ronchi  GI: Soft, nontender, nondistended. Normal bowel sounds. Neuro: Alert, oriented, appropriate. No cranial nerve deficits appreciated. Sensation intact to light touch. Motor examination reveals normal strength in all four limbs diffusely. No abnormalities with finger/nose or heel/shin noted. Reflexes normal and symmetric. Skin: No lesions or rashes noted. MSK: No joint abnormalities noted. Lab Results   Component Value Date    WBC 12.4 (H) 10/04/2021    HGB 14.4 10/04/2021    HCT 41.7 10/04/2021    MCV 86.1 10/04/2021     10/04/2021     Lab Results   Component Value Date    INR 1.07 10/02/2021    PROTIME 12.1 10/02/2021     Lab Results   Component Value Date    CREATININE 0.6 10/04/2021    BUN 6 (L) 10/04/2021     10/04/2021    K 4.2 10/04/2021     10/04/2021    CO2 23 10/04/2021     Lab Results   Component Value Date    ALT 7 (L) 10/04/2021    AST 17 10/04/2021    ALKPHOS 87 10/04/2021    BILITOT 1.5 (H) 10/04/2021       MRI brain:      Impression   Unchanged subacute right MCA infarct. Assessment:  1. Subacute R MCA infarct   2. HTN   3. Hyperlipidemia   4. Obesity     Recommendations:  Patient with new functional deficits and ongoing medical complexity. Demonstrates ability to tolerate 3 hours therapy/day. She is a good candidate for acute inpatient rehab when medically appropriate. Thank you for this consult. Please contact me with any questions or concerns. Rafa Smith MD 10/5/2021, 11:47 AM

## 2021-10-05 NOTE — CARE COORDINATION
CM updated by Katie Garcia admissions [facility] that Dr. Meseret Stauffer is planning on meeting with pt at bedside today to help with determination on IPR stay.  CM following-Nicole Jenkins RN

## 2021-10-06 NOTE — PROGRESS NOTES
Occupational Therapy  Facility/Department: [facility] C5 - MED SURG/ORTHO  Daily Treatment Note  NAME: Ayaka Hong  : 1960  MRN: [id number]    Date of Service: 10/6/2021    Discharge Recommendations:  IP Rehab       Assessment   Assessment: Pt tolerated session fair today due to fatigue. Pt participated in [address] [place] [address] AROM/PROM ther ex seated in bed. Pt limited to functional use of L side and L sided visual neglect. Pt found to be presenting with the above mentioned occupational performance deficits and would benefit from continued skilled OT in IP rehab setting at d/c. Prognosis: Fair  OT Education: OT Role;Plan of Care;Precautions; ADL Adaptive Strategies; Low Vision Education;Home Exercise Program  Patient Education: disease specific: Disease Specific Education: Pt educated on importance of OOB mobility, prevention of complications of bedrest, and general safety during hospitalization. Pt does not verbalize understanding, will require reinforcement. Barriers to Learning: cognition  Activity Tolerance  Activity Tolerance: Patient Tolerated treatment well;Treatment limited secondary to medical complications (free text)  Activity Tolerance: /79, HR 83, O2 99%  Safety Devices  Safety Devices in place: Yes  Type of devices: Call light within reach;Nurse notified; All fall risk precautions in place; Left in bed;Bed alarm in place         Patient Diagnosis(es): The primary encounter diagnosis was Cerebrovascular accident (CVA) due to occlusion of right middle cerebral artery ([address] Diagnoses of Peripheral visual field defect, left, Hypokalemia, and Hypertensive emergency were also pertinent to this visit. has a past medical history of Hypertension. has a past surgical history that includes Hysterectomy and Cholecystectomy.     Restrictions  Restrictions/Precautions  Restrictions/Precautions: General Precautions, Fall Risk, Up as Tolerated  Position Activity Restriction  Other position/activity restrictions: telemetry     Subjective   General  Chart Reviewed: Yes, Imaging, History and Physical  Patient assessed for rehabilitation services?: Yes  Response to previous treatment: Patient with no complaints from previous session  Family / Caregiver Present: No  Referring Practitioner: Dr. Claudia Landis  Diagnosis: CVA with Left side hemiparesis, Left visual neglect  General Comment  Comments: Pt in bed, agreeable to OT. Pt reports she is very tired. Pre Treatment Pain Screening  Intervention List: Patient able to continue with treatment  Vital Signs  Patient Currently in Pain: Denies     Orientation  Orientation  Overall Orientation Status: Impaired  Orientation Level: Oriented to person;Disoriented to situation;Oriented to place; Disoriented to time     Objective      Vision  Low Vision: Adaptations  Vision Comment: L sided visual neglect  Cognition  Overall Cognitive Status: Exceptions  Arousal/Alertness: Delayed responses to stimuli, inconsistent responses to stimuli  Following Commands: Inconsistently follows commands  Attention Span: Attends with cues to redirect, difficulty attending to direction  Safety Judgement: Decreased awareness of need for safety;Decreased awareness of need for assistance  Insights: Decreased awareness of deficits  Initiation: Requires cues for all  Sequencing: Requires cues for all     Perception  Overall Perceptual Status: Impaired  Unilateral Attention: Cues to attend to left side of body;Cues to attend left visual field         Type of ROM/Therapeutic Exercise  Type of ROM/Therapeutic Exercise: AROM;PROM  Comment: BUE ther ex in bed  Exercises  Shoulder Flexion: x10 AROM RUE; x10 PROM LUE  Shoulder ABduction: x10 AROM RUE; x10 PROM LUE  Shoulder ADduction: x10 AROM RUE; x10 PROM LUE  Horizontal ABduction: x10 AROM RUE; x10 PROM LUE  Horizontal ADduction: x10 AROM RUE; x10 PROM LUE  Elbow Flexion: x10 PROM LUE  Elbow Extension: x10 PROM LUE  Supination: x10 AROM RUE; x10 PROM LUE  Pronation: x10 AROM RUE; x10 PROM LUE  Wrist Flexion: x10 AROM RUE; x10 PROM LUE  Wrist Extension: x10 AROM RUE; x10 PROM LUE  Finger Flexion: x10 AROM RUE; x10 PROM LUE  Finger Extension: x10 AROM RUE; x10 PROM LUE      Plan   Plan  Times per week: 3-5x/ week  Current Treatment Recommendations: Strengthening, ROM, Balance Training, Functional Mobility Training, Safety Education & Training, Positioning, Self-Care / ADL, Neuromuscular Re-education, Cognitive/Perceptual Training, Patient/Caregiver Education & Training     AM-PAC Score   AM-PAC Inpatient Daily Activity Raw Score: 14 (10/06/21 1325)  AM-PAC Inpatient ADL T-Scale Score : 33.39 (10/06/21 1325)  ADL Inpatient CMS 0-100% Score: 59.67 (10/06/21 1325)  ADL Inpatient CMS G-Code Modifier : CK (10/06/21 1325)    Goals  Short term goals  Time Frame for Short term goals: 1 week(10-10-21)-ongoing as of 10/04  Short term goal 1: min assist with LE self care by 10-10-21--ongoing--10/6/21  Short term goal 2: min assist with functional/toilet transfers by 10-08-21--ongoing--10/6/21  Short term goal 3: min assist with bathroom mobility with LRAD (Least Restrictive Assistive Device)--ongoing--10/6/21  Short term goal 4: supervision with 10 reps LUE self ROM/AROM--ongoing--10/6/21  Patient Goals   Patient goals : go home when able       Therapy Time   Individual Concurrent Group Co-treatment   Time In 1157         Time Out 1224         Minutes 27         Timed Code Treatment Minutes: 27 Minutes       GRACIELA Garcia/AMENA    Agree with above. All information reviewed by Julio César Riley COTA/GIORGIO

## 2021-10-06 NOTE — PROGRESS NOTES
RN assisted pt to bathroom to have a BM with therapy, pt was an assist x 2 with walker. Pt them went limp with extremities while having the BM , eyes rolled back, pt SR on CMU whole event, hoyered back to bed, Vital signs assessed, RR called for assistance  MD at bedside,, STAT CT ordered. Pt placed on 3L NC  for comfort oxygen saturation WNL whole episode. Pt back on RA saturation of 97% HR  68, pt now opening eyes RN asked pt how she felt, pt responded , 'Not bad, thank you'. Awaiting CT, resting comfortably in bed, seizure pads and suction at bedside incase of another event. Pt remains on tele.  Previous event happened similar overnight

## 2021-10-06 NOTE — PROGRESS NOTES
Gillespie Lory  Neurology Follow-up  [facility] Neurology    Date of Service: 10/6/2021    Subjective:   CC: Follow up today regarding: Acute left-sided weakness, confusion, and acute CVA    Events noted. Chart and lab reviewed. Rapid response called for syncopal episode while on toilet. This happened overnight as well. No evidence of tonic/clonic jerking with episodes. Slow to wake up following event, but able to follow simple commands. Does not seem post-ictal.      ROS : A 10-12 system review obtained and updated today and is unremarkable except as mentioned  in my interval history. Family history: Noncontributory.     Past Medical History:   Diagnosis Date    Hypertension      Current Facility-Administered Medications   Medication Dose Route Frequency Provider Last Rate Last Admin    perflutren lipid microspheres (DEFINITY) injection 1.65 mg  1.5 mL IntraVENous ONCE PRN Katherine Mendiola MD        magnesium sulfate 2000 mg in 50 mL IVPB premix  2,000 mg IntraVENous Once Ynes Santo MD        labetalol (NORMODYNE;TRANDATE) injection 10 mg  10 mg IntraVENous Q4H PRN Katherine Mendiola MD        amLODIPine (NORVASC) tablet 10 mg  10 mg Oral Daily Darrion Castillo MD   10 mg at 10/06/21 0802    HYDROcodone-acetaminophen (Johnnie Erin) 5-325 MG per tablet 1 tablet  1 tablet Oral Q6H PRN Darrion Castillo MD   1 tablet at 10/03/21 1406    carvedilol (COREG) tablet 12.5 mg  12.5 mg Oral BID WC Darrion Castillo MD   12.5 mg at 10/06/21 0802    potassium chloride 10 mEq/100 mL IVPB (Peripheral Line)  10 mEq IntraVENous PRN Baljinder Rivera DO        magnesium sulfate 2000 mg in 50 mL IVPB premix  2,000 mg IntraVENous PRN Baljinder Rivera DO   Stopped at 10/06/21 0140    promethazine (PHENERGAN) tablet 12.5 mg  12.5 mg Oral Q6H PRN Ahmad LAZARO WalterMarino, DO        Or    ondansetron (ZOFRAN) injection 4 mg  4 mg IntraVENous Q6H PRN Ahmad A Marino, DO        acetaminophen (TYLENOL) tablet 650 mg  650 mg Oral Q6H PRN Bernida Slough, DO   650 mg at 10/05/21 2054    Or    acetaminophen (TYLENOL) suppository 650 mg  650 mg Rectal Q6H PRN Sam CharlesDO glenroy        polyethylene glycol (GLYCOLAX) packet 17 g  17 g Oral Daily PRN Heriberto Tee, DO        aspirin EC tablet 81 mg  81 mg Oral Daily Kevin Pichardo DO   81 mg at 10/06/21 7378    Or    aspirin suppository 300 mg  300 mg Rectal Daily Angelicaira LAZARO Marino DO        perflutren lipid microspheres (DEFINITY) injection 1.65 mg  1.5 mL IntraVENous ONCE PRN Ovidio Pichardo, DO        atorvastatin (LIPITOR) tablet 80 mg  80 mg Oral Nightly Kevin Pichardo DO   80 mg at 10/05/21 2054    COVID-19 Ad26 Vaccine ([employer]) injection 0.5 mL  1 Dose IntraMUSCular Prior to discharge Sam Pichardo          Allergies   Allergen Reactions    Bee Pollen Swelling    Lisinopril Nausea Only    Varenicline Nausea And Vomiting      reports that she has been smoking cigarettes. She started smoking about 41 years ago. She has a 20.00 pack-year smoking history. She has never used smokeless tobacco. She reports that she does not drink alcohol and does not use drugs. Objective:  Exam:   Constitutional:   Vitals:    10/06/21 0758 10/06/21 1000 10/06/21 1008 10/06/21 1016   BP: (!) 172/94 (!) 153/85 (!) 153/85 109/69   Pulse: 70  69 65   Resp: 14  12    Temp: 97.5 °F (36.4 °C)      TempSrc: Oral      SpO2: 99%  96% 99%   Weight:       Height:         General appearance:  Normal development and appear in no acute distress. Mental Status:   Oriented to person. Intact remote memory. Poor attention span and concentration. Language: intact naming, repeating and fluency   Poor fund of Knowledge. Cranial Nerves:   II: Visual fields: Left-sided neglect. Pupils: equal, round, reactive to light  III,IV,VI: Extra Ocular Movements are intact. No nystagmus  V: Facial sensation is intact  VII: Facial strength and movements: left facial droop noted.     IX: Palate elevation is plan as documented in NP note  Patient seen and examined today. On exam: She is awake and alert but waxing and waning. Left-sided neglect which is apparent. Left-sided weakness as well. Able to follow direction with easy distraction. Denies any headache or neck pain. Face is symmetric. No gaze preference. No sensory loss. Tone is normal.  Reviewed CT head which showed evolution of the right hemispheric CVA with mild midline shift  Avoid hypotension which could be the cause of her recurrent syncope  Aspirin for now  PT and OT but avoid aggressive physical activity  Elevation head of bed 45 degrees  Frequent neurochecks  DVT and GI prophylaxis  Telemetry  Hold blood pressure medication if blood pressure below 140/95 may need to adjust some of her medicine  Insulin sliding scale  Prognosis still waxing and waning and guarded    We will follow    Electronically signed by Danielle Chin MD on 10/6/21 at 2:31 PM EDT        This dictation was generated by voice recognition computer software. Although all attempts are made to edit the dictation for accuracy, there may be errors in the transcription that are not intended.

## 2021-10-06 NOTE — DISCHARGE SUMMARY
Hospital Medicine Progress Note    Patient ID: Delvis Polanco      Patient's PCP: Jelly Zamarripa, APRN - CNP    Admit Date: 10/2/2021     Discharge Date:   10/06/21     Admitting Physician: Burke Ramirez DO     Discharge Physician: Dilia Stone MD     Discharge Diagnoses: Active Hospital Problems    Diagnosis     Peripheral visual field defect, left [H53.452]     Acute ischemic stroke ([address] [I63.9]     Hypertensive emergency [I16.1]        The patient was seen and examined on day of discharge and this discharge summary is in conjunction with any daily progress note from day of discharge. Hospital Course:  64 Y F with a h/o smoking, obesity, HTN, and HLD presented with one day of left-sided vision changes, dropping things with her left hand, and LUE paresthesias.          Large RMCA subacute ischemic stroke  - probably thromboembolic. Her CTA showed an occlusion of the origin of the RMCA, also significant stenoses of the LMCA. - aspirin, statin  - PT/OT  - no seizure activity during her syncopal spells, but still f/u another head CT.  - appreciate neurology input     Severe HTN  - eventual BP control. OK to let it gradually come down over the next few days in light of her stroke and syncopal spells. - her amlodipine and carvedilol were resumed over the weekend. BP stayed very high. Added chlorthalidone, but then her Na decreased, BUN increased, and she had a syncopal spell, so this was stopped overnight. Could start low-dose losartan in the near future. Syncopal episodes on 10/5 and 10/6. Probably vasovagal since both episodes occurred on the toilet. No events on tele. - will get another TTE to re-evaluate LV function     HLD. Statin.     Obesity. Encouraged diet and exercise as able.           Physical Exam Performed:     BP (!) 172/94   Pulse 70   Temp 97.5 °F (36.4 °C) (Oral)   Resp 14   Ht 5' 4\" (1.626 m)   Wt 187 lb 6.3 oz (85 kg)   SpO2 99%   BMI 32.17 cerebral artery   branches in comparison to the left. This correlates with findings of large   acute/subacute right middle cerebral artery territory infarct. 2.  Short segment high-grade stenosis of a left middle cerebral artery M2   branch. Mild-to-moderate stenosis in the left middle cerebral artery M1   segment. Diffuse atherosclerotic irregularity of the more distal left middle   cerebral artery branches. 3.  The bilateral posterior cerebral arteries are diffusely small and not   well seen, underlying disease is not excluded. 4.  No hemodynamically significant stenosis in the bilateral cervical   internal carotid arteries per NASCET criteria. Bilateral carotid bulb   atherosclerotic plaque. 5.  Patent bilateral vertebral arteries. Critical results were called by Dr. Robert Fiore to Dr. Jeimy Landers on   10/2/2021 at 19:49. This scan was analyzed using [employer]. ai contact LVO. Identification of suspected   findings is not for diagnostic use beyond notification. Viz LVO is limited to   analysis of imaging data and should not be used in-lieu of full patient   evaluation or relied upon to make or confirm diagnosis. CT HEAD WO CONTRAST   Final Result   1. Large acute/subacute stroke in the right middle cerebral artery   territory. No hemorrhagic conversion. 2.  Sulcal effacement throughout the right cerebral hemisphere with partial   effacement of the right lateral and 3rd ventricles and minimal right left   midline shift measuring 2 mm. 3.  Chronic small vessel ischemic disease. Old left thalamic and basal   ganglia lacunar infarcts are similar to the prior study. Critical results were called by Dr. Robert Fiore to Dr. Jeimy Landers on   10/2/2021 at 19:29.                 Consults:     IP CONSULT TO STROKE TEAM  IP CONSULT TO HOSPITALIST  IP CONSULT TO NEUROLOGY  IP CONSULT TO FINANCIAL COUNSELOR  IP CONSULT TO CRITICAL CARE  IP CONSULT TO PHYSICAL MEDICINE REHAB    Disposition:  [facility]     Condition at Discharge: Stable    Discharge Instructions/Follow-up:  Follow up with PCP within 1-2 weeks. Check a BMP in 7 days since we started losartan. Code Status:  Full Code     Activity: activity as tolerated    Diet: cardiac diet      Discharge Medications:     Current Discharge Medication List           Details   losartan (COZAAR) 25 MG tablet Take 1 tablet by mouth daily  Qty: 30 tablet, Refills: 3              Details   atorvastatin (LIPITOR) 80 MG tablet Take 1 tablet by mouth nightly  Qty: 30 tablet, Refills: 3              Details   amLODIPine (NORVASC) 10 MG tablet Take 1 tablet by mouth daily  Qty: 90 tablet, Refills: 1    Associated Diagnoses: HTN (hypertension), benign      aspirin 81 MG chewable tablet Take 1 tablet by mouth daily  Qty: 90 tablet, Refills: 1    Associated Diagnoses: HTN (hypertension), benign      carvedilol (COREG) 12.5 MG tablet Take 1 tablet by mouth 2 times daily  Qty: 180 tablet, Refills: 1    Associated Diagnoses: HTN (hypertension), benign      omeprazole (PRILOSEC) 20 MG delayed release capsule Take 1 capsule by mouth daily  Qty: 90 capsule, Refills: 1    Associated Diagnoses: Gastroesophageal reflux disease without esophagitis               Time Spent on discharge is more than 30 minutes in the examination, evaluation, counseling and review of medications and discharge plan. Signed:    Yuri Yu MD   10/6/2021      Thank you RONNELL Hermosillo - REAGAN for the opportunity to be involved in this patient's care. If you have any questions or concerns please feel free to contact me at [phone].

## 2021-10-06 NOTE — DISCHARGE INSTR - COC
Continuity of Care Form    Patient Name: Juris Schilder   :  1960  MRN:  [id number]    Admit date:  10/2/2021  Discharge date:  10/11/21    Code Status Order: Full Code   Advance Directives:      Admitting Physician:  Suma Sigala DO  PCP: Marivel Shepard, APRN - CNP    Discharging Nurse: [facility] Unit/Room#: 7194/3729-17  Discharging Unit Phone Number: [phone]    Emergency Contact:   Extended Emergency Contact Information  Primary Emergency Contact: [address] Phone: [phone]  Relation: Brother/Sister  Secondary Emergency Contact: [address] [place] [address] Phone: [phone]  Relation: Other    Past Surgical History:  Past Surgical History:   Procedure Laterality Date    CHOLECYSTECTOMY      HYSTERECTOMY         Immunization History:   Immunization History   Administered Date(s) Administered    Tdap (Boostrix, Adacel) 2015       Active Problems:  Patient Active Problem List   Diagnosis Code    Hypertensive emergency I16.1    Hypomagnesemia E83.42    Syncope R55    Smoker F17.200    Hypokalemia E87.6    Thyroid nodule E04.1    Hypertensive urgency I16.0    CVA (cerebral vascular accident) ([address] I63.9    HTN (hypertension), benign I10    Dyslipidemia E78.5    GERD (gastroesophageal reflux disease) K21.9    Left lumbar radiculitis M54.16    Lumbar spondylosis M47.816    Myofascial muscle pain M79.18    Tobacco use disorder F17.200    SOB (shortness of breath) R06.02    Dizziness R42    Acute ischemic stroke (HCC) I63.9    Peripheral visual field defect, left H53.452       Isolation/Infection:   Isolation            No Isolation          Patient Infection Status       None to display            Nurse Assessment:  Last Vital Signs: BP (!) 172/94   Pulse 70   Temp 97.5 °F (36.4 °C) (Oral)   Resp 14   Ht 5' 4\" (1.626 m)   Wt 187 lb 6.3 oz (85 kg)   SpO2 99%   BMI 32.17 kg/m²     Last documented pain score (0-10 scale): Pain Level: 2  Last Weight:   Wt Readings from Last 1 Encounters:   10/06/21 187 lb 6.3 oz (85 kg)     Mental Status:  disoriented and alert    IV Access:  -peripheral IV     Nursing Mobility/ADLs:  Walking   Dependent  Transfer  Dependent  Bathing  Dependent  Dressing  Dependent  Toileting  Dependent  Feeding  Dependent  Med Admin  Dependent  Med Delivery   whole    Wound Care Documentation and Therapy:        Elimination:  Continence:   · Bowel: No  · Bladder: No  Urinary Catheter: None   Colostomy/Ileostomy/Ileal Conduit: No       Date of Last BM: 10/7/21    Intake/Output Summary (Last 24 hours) at 10/6/2021 0958  Last data filed at 10/6/2021 0547  Gross per 24 hour   Intake 480 ml   Output 310 ml   Net 170 ml     I/O last 3 completed shifts: In: 480 [P.O.:480]  Out: 310 [Urine:310]    Safety Concerns: At Risk for Falls    Impairments/Disabilities:      Speech    Nutrition Therapy:  Current Nutrition Therapy:   - Oral Diet:  Dysphagia 1 pureed    Routes of Feeding: Oral  Liquids: No Restrictions  Daily Fluid Restriction: no  Last Modified Barium Swallow with Video (Video Swallowing Test): not done    Treatments at the Time of Hospital Discharge:   Respiratory Treatments: ***  Oxygen Therapy:  is not on home oxygen therapy.   Ventilator:    - No ventilator support    Rehab Therapies: Physical Therapy, Occupational Therapy and Speech/Language Therapy  Weight Bearing Status/Restrictions: No weight bearing restirctions  Other Medical Equipment (for information only, NOT a DME order):  hospital bed  Other Treatments: ***    Patient's personal belongings (please select all that are sent with patient):  None    RN SIGNATURE:  Electronically signed by Camille Linda RN on 10/11/21 at 1:55 PM EDT    CASE MANAGEMENT/SOCIAL WORK SECTION    Inpatient Status Date: ***    Readmission Risk Assessment Score:  Readmission Risk              Risk of Unplanned Readmission:  12           Discharging to Facility/ Agency     [address] [place] [address]   [place] [address]   [phone]       / signature: {Esignature:[id number]:::0}    PHYSICIAN SECTION    Prognosis: Good    Condition at Discharge: Stable    Rehab Potential (if transferring to Rehab): Good    Recommended Labs or Other Treatments After Discharge: Follow up with PCP within 1-2 weeks. Check a BMP in 7 days since we started losartan. Physician Certification: I certify the above information and transfer of Sandhya Camarena  is necessary for the continuing treatment of the diagnosis listed and that she requires Acute Rehab for less 30 days.      Update Admission H&P: No change in H&P    PHYSICIAN SIGNATURE:  Electronically signed by Naty Breen MD on 10/6/21 at 9:59 AM EDT

## 2021-10-06 NOTE — PROGRESS NOTES
Rapid response called. Responded to room. Per primary RN pt experienced a syncopal episode while in the restroom. No head trauma. She was urgently returned to bed and assessed. VS stable at this time with HR 65 bpm, /84 per cuff reading, and SpO2 at 97% on room air. Arousable to voice and stimulation though still noted to have some mild cognitive impairment, thus requiring use of tele-sitter. 12 lead EKG obtained and reviewed by MD, citing normal sinus rhythm at this time and consistent with prior EKG on 10/2. Pt had been admitted on 10/2 for vision changes. Was found to have subacute MCA infarct and admitted to B3. Ultimately was transferred to ICU on 10/3, then to c5 on 10/4. Currently pending transfer to ARU. Dr. Aroldo Mckeon at bedside and discussed case. Per MD, this event was seemingly a vagal episode versus orthostasis 2/2 dehydration. . Given lack of labs for current day a BMP, UA, and UDS added. MD also ordered magnesium replacement due to a Mg2+ of 1.6. IVF were initiated and chlorthalidone was held in the setting of prerenal azotemia. Pt to be placed on continuous telemetry to monitor rhythm and for any additional syncopal events. No additional needs at this time.      Donato Munoz, RONNELL - CNP

## 2021-10-06 NOTE — PROGRESS NOTES
Physical Therapy  Facility/Department: [facility] C5 - MED SURG/ORTHO  Daily Treatment Note  NAME: Fabio Calloway  : 1960  MRN: [id number]    Date of Service: 10/6/2021    Discharge Recommendations:  IP Rehab   PT Equipment Recommendations  Equipment Needed: No  Other: defer to patient's facility  If pt is unable to be seen after this session, please let this note serve as discharge summary. Please see case management note for discharge disposition. Thank you. Assessment   Body structures, Functions, Activity limitations: Decreased functional mobility ; Decreased strength;Decreased endurance;Decreased vision/visual deficit; Decreased coordination;Decreased posture;Decreased ADL status; Decreased safe awareness;Decreased high-level IADLs;Decreased cognition;Decreased balance;Decreased fine motor control  Assessment: Pt sleeping upon entry but woke easily and agreeable to therapy. Confused on where she was. Assisted pt to sit EOB for breakfast needing Min-mod A. Pt impulsive and mildly unsteady. decreased attention to L side, responded well to verbal and tactile cues. Pt stated she needed to go to the bathroom. PT called for the RN to provide Ax2 for safety due to balance deficits and impulsivity. Pt was able to ambulate with Mod A x 2 x 10' to the toilet. Shortly after sitting pt became unresponsive to verbal stimulus and sternal rub. RN present throughout. Max lift was used to dep lift pt and transfer back to bed. Rapid called. This is the 2nd time pt has had a vagal response on the toilet. Con't to recommend IPR when pt is able to participate/tolerate 3 hr of therapy. Treatment Diagnosis: Decreased independence with functional mobility  Specific instructions for Next Treatment: progress mobility as tolerated  Prognosis: Good  Decision Making: Medium Complexity  PT Education: Goals; Functional Mobility Training;Precautions;PT Role;Pressure Relief; Injury Prevention;Plan of Care;Equipment;Gait Training;Home Exercise Program;General Safety; Low Vision Education;Disease Specific Education  Patient Education: Pt educated in role of therapy, L neglect, safety awareness. Pt does not show evidence of learning today due to lethargy  Barriers to Learning: left visual neglect, visual field cut, delayed processing, lethargy  REQUIRES PT FOLLOW UP: Yes  Activity Tolerance  Activity Tolerance: Patient limited by fatigue;Patient limited by endurance; Patient limited by cognitive status     Patient Diagnosis(es): The primary encounter diagnosis was Cerebrovascular accident (CVA) due to occlusion of right middle cerebral artery ([address] Diagnoses of Peripheral visual field defect, left, Hypokalemia, and Hypertensive emergency were also pertinent to this visit. has a past medical history of Hypertension. has a past surgical history that includes Hysterectomy and Cholecystectomy. Restrictions  Restrictions/Precautions  Restrictions/Precautions: General Precautions, Fall Risk, Up as Tolerated  Position Activity Restriction  Other position/activity restrictions: telemetry  Subjective   General  Chart Reviewed: Yes  Additional Pertinent Hx: HPI per chart, \"56 y.o. female who presented to [facility] with past medical history of hypertension, class I obesity, hyperlipidemia, GERD presented to the ED with chief complaint of eye vision change and headache. Patient also reports that she has been dropping things on her left side that is new in addition to having left fifth finger numbness. CT Head: 1. Large acute/subacute stroke in the right middle cerebral arteryterritory. No hemorrhagic conversion. Stroke team was called and reported that the patient is not a candidate for mechanical thrombectomy or TPA. \"  Response To Previous Treatment: Patient with no complaints from previous session. Family / Caregiver Present: Yes  Referring Practitioner: Oc Garcia MD  Subjective  Subjective: Patient agreed to participate. Pt lethargic  General Comment  Comments: Supine in bed upon entry. Cleared for therapy. Pain Screening  Patient Currently in Pain: Denies  Vital Signs  Pulse: 65  BP: 109/69  Patient Currently in Pain: Denies  Oxygen Therapy  SpO2: 100 %       Orientation  Orientation  Overall Orientation Status: Impaired  Orientation Level: Oriented to person;Disoriented to place; Disoriented to situation;Oriented to time  Cognition      Objective   Bed mobility  Supine to Sit: Minimal assistance  Sit to Supine: Dependent/Total  Transfers  Sit to Stand: Moderate Assistance  Stand to sit: Moderate Assistance  Ambulation  Ambulation?: Yes  More Ambulation?: Yes  Ambulation 1  Surface: level tile  Device: Rolling Walker  Assistance: Moderate assistance;2 Person assistance  Quality of Gait: Poor motor control of L LE and UE. Mod A for walker management. Increasing L Lateral lean. Gait Deviations: Slow Rosalba;Decreased step length;Decreased step height  Distance: 15 ft     Balance  Posture: Fair  Sitting - Static: Fair;+  Sitting - Dynamic: Fair;+  Standing - Static: Poor;+  Standing - Dynamic: Poor         AM-PAC Score  AM-PAC Inpatient Mobility Raw Score : 11 (10/06/21 1201)  AM-PAC Inpatient T-Scale Score : 33.86 (10/06/21 1201)  Mobility Inpatient CMS 0-100% Score: 72.57 (10/06/21 1201)  Mobility Inpatient CMS G-Code Modifier : CL (10/06/21 1201)          Goals  Short term goals  Time Frame for Short term goals: 1 week 10/13/21unless noted  Short term goal 1: Supine <> sit with supervision- 10/6 mod assist  Short term goal 2: Sit <> stand with CGA by 10/12- 1/5 mod assist in STEDY  Short term goal 3: Bed <> chair with CGA- 10/5 not attempted due to pt lethargy and decreased safety  Short term goal 4: Ambulate 50 feet with LRAD and min assist.- 10/6: 10' with Mod A x 2  Short term goal 5: By 10/6/21 Patient will tolerate 12-15 reps of her exercises to maximize her strength/endurance- 10/5 pt tolerated 10-12 reps LE ex  Patient Goals   Patient goals :  To become stronger. Plan    Plan  Times per week: 3-5/week  Times per day: Daily  Plan weeks: 10/13/21  Specific instructions for Next Treatment: progress mobility as tolerated  Current Treatment Recommendations: Strengthening, Functional Mobility Training, Equipment Evaluation, Education, & procurement, ROM, Transfer Training, Gait Training, Safety Education & Training, Balance Training, ADL/Self-care Training, Endurance Training, Positioning, Patient/Caregiver Education & Training, Stair training  Safety Devices  Type of devices:  All fall risk precautions in place, Call light within reach, Gait belt, Patient at risk for falls, Left in bed, Nurse notified, Bed alarm in place  Restraints  Initially in place: No     Therapy Time   Individual Concurrent Group Co-treatment   Time In 0930         Time Out 1010         Minutes 1500 [address], [address] [facility]

## 2021-10-06 NOTE — PROGRESS NOTES
Patient nurse,Madeleine, called regarding covid vaccine states not stable enough for injection today will recheck with staff on friday

## 2021-10-07 NOTE — PROGRESS NOTES
g  17 g Oral Daily PRN Lokeshmaggie Rebecca, DO        aspirin EC tablet 81 mg  81 mg Oral Daily Kevin Pichardo DO   81 mg at 10/06/21 8767    Or    aspirin suppository 300 mg  300 mg Rectal Daily Kevin Pichardo DO        perflutren lipid microspheres (DEFINITY) injection 1.65 mg  1.5 mL IntraVENous ONCE PRN Charlene Pichardo DO        atorvastatin (LIPITOR) tablet 80 mg  80 mg Oral Nightly Kevin Pichardo DO   80 mg at 10/05/21 2054    COVID-19 Ad26 Vaccine (J&J, DNS:Net) injection 0.5 mL  1 Dose IntraMUSCular Prior to discharge Luisa Pichardo DO         Allergies   Allergen Reactions    Bee Pollen Swelling    Lisinopril Nausea Only    Varenicline Nausea And Vomiting      reports that she has been smoking cigarettes. She started smoking about 41 years ago. She has a 20.00 pack-year smoking history. She has never used smokeless tobacco. She reports that she does not drink alcohol and does not use drugs. Objective:  Exam:   Constitutional:   Vitals:    10/06/21 2057 10/07/21 0000 10/07/21 0339 10/07/21 0634   BP:  (!) 156/82 (!) 165/84    Pulse: 63 65 69    Resp:  16 16    Temp:  98.1 °F (36.7 °C) 97.8 °F (36.6 °C)    TempSrc:  Oral Axillary    SpO2:  96% 98%    Weight:    196 lb 6.9 oz (89.1 kg)   Height:         General appearance:  Normal development and appear in no acute distress. Mental Status:   Oriented to person, place, time. Follows commands. Intact remote memory. Poor attention span and concentration. Language: intact naming, repeating and fluency   Poor fund of Knowledge. Cranial Nerves:   II: Visual fields: Left-sided neglect. Pupils: equal, round, reactive to light  III,IV,VI: Extra Ocular Movements are intact. No nystagmus  V: Facial sensation is intact  VII: Facial strength and movements: left facial droop noted. IX: Palate elevation is symmetric  XI: Shoulder shrug is intact  XII: Tongue movements are normal  Musculoskeletal: LUE and LLE 2/5. Right side 5/5.   Tone: Normal tone.   Coordination: left pronator drift. Sensation: left sided neglect  Gait/Posture: did not test gait due to poor cooperation with the patient. Data:  LABS:   Lab Results   Component Value Date     10/07/2021    K 4.1 10/07/2021     10/07/2021    CO2 18 10/07/2021    BUN 43 10/07/2021    CREATININE 0.7 10/07/2021    GFRAA >60 10/07/2021    LABGLOM >60 10/07/2021    GLUCOSE 116 10/07/2021    PHOS 3.6 03/03/2021    MG 1.60 10/05/2021    CALCIUM 9.4 10/07/2021     Lab Results   Component Value Date    WBC 14.6 10/07/2021    RBC 4.13 10/07/2021    HGB 12.0 10/07/2021    HCT 35.4 10/07/2021    MCV 85.8 10/07/2021    RDW 13.2 10/07/2021     10/07/2021     Lab Results   Component Value Date    INR 1.07 10/02/2021    PROTIME 12.1 10/02/2021       Neuroimaging was independently reviewed by me and discussed results with the patient. Repeat CT head reviewed and showed evolution of a large right MCA stroke with mild midline shift. I reviewed blood testing and other test results and discussed results with the patient      Impression:    Acute confusion with left-sided neglect and visual field deficit consistent with new, large, ischemic right MCA stroke, likely thromboembolic from MCA stenosis and occlusion. Repeat head CT 10/6 following syncopal episode revealed evolving right MCA stroke with slightly increased midline shift. ECHO reviewed, no PFO or thrombus. Hypertension, not controlled  Hyperlipidemia  Prediabetes, A1c 5.7. Orthostatic hypotension    Recommendation     Monitor on tele. Continue ASA and statin. Continue BP meds. Goal BP is 160/90 for now. Avoid hypotension, recommend to HOLD BP meds if BP < 140/90. PT/OT/SLP - will need to proceed slowly with therapy to avoid orthostasis. Consider compression stockings to improve venous return. Jeppie Lefort, CNP        This dictation was generated by voice recognition computer software.  Although all attempts are made to edit the dictation for accuracy, there may be errors in the transcription that are not intended.

## 2021-10-07 NOTE — CARE COORDINATION
CM met with pt at bedside and pt in agreement with DCP to [facility]/ARU. Spoke to Herve in admissions and they will re-evaluate again tomorrow. CM note Dr. Dale Aguilar note today, \"Dispo - suspect that she will be ready for the ARU on 10/8 if she doesn't seem to be worsening. \" CM following-Nicole Jenkins, RN

## 2021-10-07 NOTE — PROGRESS NOTES
Patient not interacting, very drowsy, and unable to complete a neuro assessment. Patient had difficulty following commands. Patient was incontinent of bladder. Patient's eyes were rolled back during assessment, pupils were a 1, and reaction was sluggish. Extremity pulses were all WNL, but temp is cool. Patient was extending legs with feet pointed downward, body was rigid, lasting only briefly.

## 2021-10-07 NOTE — PROGRESS NOTES
Physical Therapy  Facility/Department: [facility] C5 - MED SURG/ORTHO  Daily Treatment Note  NAME: Sulema Grove  : 1960  MRN: [id number]    Date of Service: 10/7/2021    Discharge Recommendations:  IP Rehab   PT Equipment Recommendations  Equipment Needed: No  Other: defer to patient's facility  If pt is unable to be seen after this session, please let this note serve as discharge summary. Please see case management note for discharge disposition. Thank you. Assessment   Body structures, Functions, Activity limitations: Decreased functional mobility ; Decreased strength;Decreased endurance;Decreased vision/visual deficit; Decreased coordination;Decreased posture;Decreased ADL status; Decreased safe awareness;Decreased high-level IADLs;Decreased cognition;Decreased balance;Decreased fine motor control  Assessment: Pt continues to be very lethargic but answered appropriately to orientation questions. Required max cues and addditional time to process to complete tasks. L side neglect needing addtional assist. Tx focuses on sitting balance, attention to L side and exercises. REturned to supine at EOS  Treatment Diagnosis: Decreased independence with functional mobility  Specific instructions for Next Treatment: progress mobility as tolerated  Prognosis: Good  Decision Making: Medium Complexity  PT Education: Goals; Functional Mobility Training;Precautions;PT Role;Pressure Relief; Injury Prevention;Plan of Care;Equipment;Gait Training;Home Exercise Program;General Safety; Low Vision Education;Disease Specific Education  Patient Education: Pt educated in role of therapy, L neglect, safety awareness. Pt does not show evidence of learning today due to lethargy  Barriers to Learning: left visual neglect, visual field cut, delayed processing, lethargy  REQUIRES PT FOLLOW UP: Yes  Activity Tolerance  Activity Tolerance: Patient limited by fatigue;Patient limited by endurance; Patient limited by cognitive status  Activity Tolerance: /178  HR 62  SpO2 96%     Patient Diagnosis(es): The primary encounter diagnosis was Cerebrovascular accident (CVA) due to occlusion of right middle cerebral artery ([address] Diagnoses of Peripheral visual field defect, left, Hypokalemia, and Hypertensive emergency were also pertinent to this visit. has a past medical history of Hypertension. has a past surgical history that includes Hysterectomy and Cholecystectomy. Restrictions  Restrictions/Precautions  Restrictions/Precautions: General Precautions, Fall Risk, Up as Tolerated, Seizure  Position Activity Restriction  Other position/activity restrictions: telemetry, yuri bautista  Subjective   General  Chart Reviewed: Yes  Additional Pertinent Hx: HPI per chart, \"56 y.o. female who presented to [facility] with past medical history of hypertension, class I obesity, hyperlipidemia, GERD presented to the ED with chief complaint of eye vision change and headache. Patient also reports that she has been dropping things on her left side that is new in addition to having left fifth finger numbness. CT Head: 1. Large acute/subacute stroke in the right middle cerebral arteryterritory. No hemorrhagic conversion. Stroke team was called and reported that the patient is not a candidate for mechanical thrombectomy or TPA. \"  Response To Previous Treatment: Patient with no complaints from previous session. Family / Caregiver Present: Yes (brother stepped out during therapy)  Referring Practitioner: Yumiko Escobar MD  Subjective  Subjective: Patient agreed to participate. Pt lethargic  General Comment  Comments: Supine in bed upon entry. Cleared for therapy. Pain Screening  Patient Currently in Pain: Denies  Vital Signs  Patient Currently in Pain: Denies       Orientation  Orientation  Overall Orientation Status: Within Normal Limits  Cognition      Objective   Bed mobility  Supine to Sit: Moderate assistance (additional time, cues)  Sit to Supine:  Moderate assistance  Transfers  Sit to Stand: Moderate Assistance  Stand to sit: Moderate Assistance  Ambulation  Ambulation?: Yes  More Ambulation?: Yes  Ambulation 1  Surface: level tile  Device: Rolling Walker  Assistance: Moderate assistance  Quality of Gait: Poor motor control of L LE and UE. Mod A for walker management. Increasing L Lateral lean. Gait Deviations: Slow Rosalba;Decreased step length;Decreased step height  Distance: 3 steps to [facility]     Balance  Posture: Poor  Sitting - Static: Fair;+  Sitting - Dynamic: Fair;+  Standing - Static: Poor;+  Standing - Dynamic: Poor  Exercises  Hip Flexion: 1 x 10  Knee Long Arc Quad: 1 x 10  Ankle Pumps: 12 x B                  AM-PAC Score  AM-PAC Inpatient Mobility Raw Score : 11 (10/07/21 1213)  AM-PAC Inpatient T-Scale Score : 33.86 (10/07/21 1213)  Mobility Inpatient CMS 0-100% Score: 72.57 (10/07/21 1213)  Mobility Inpatient CMS G-Code Modifier : CL (10/07/21 1213)          Goals  Short term goals  Time Frame for Short term goals: 1 week 10/13/21unless noted  Short term goal 1: Supine <> sit with supervision- 10/7 mod assist  Short term goal 2: Sit <> stand with CGA by 10/12- 1/7 mod assist  to RW  Short term goal 3: Bed <> chair with CGA- 10/7 not attempted due to pt lethargy and decreased safety  Short term goal 4: Ambulate 50 feet with LRAD and min assist.- 10/6: 10' with Mod A x 2  Short term goal 5: By 10/6/21 Patient will tolerate 12-15 reps of her exercises to maximize her strength/endurance- MET, on going  Patient Goals   Patient goals : To become stronger.     Plan    Plan  Times per week: 3-5/week  Times per day: Daily  Plan weeks: 10/13/21  Specific instructions for Next Treatment: progress mobility as tolerated  Current Treatment Recommendations: Strengthening, Functional Mobility Training, Equipment Evaluation, Education, & procurement, ROM, Transfer Training, Gait Training, Safety Education & Training, Balance Training, ADL/Self-care Training, Endurance Training, Positioning, Patient/Caregiver Education & Training, Stair training  Safety Devices  Type of devices:  All fall risk precautions in place, Call light within reach, Gait belt, Patient at risk for falls, Left in bed, Nurse notified, Bed alarm in place  Restraints  Initially in place: No     Therapy Time   Individual Concurrent Group Co-treatment   Time In 1105         Time Out 1130         Minutes [address], PT

## 2021-10-07 NOTE — PROGRESS NOTES
Speech Language Pathology  Facility/Department: [facility] C5 - MED SURG/ORTHO  Dysphagia Daily Treatment Note    Recommendations:  Solid Consistency: Pureed (IDDSI 4)  Liquid Consistency: Thin liquid (IDDSI 0)  Medication: with puree, crushed as allowable    Compensatory Strategies: Sit up for all meals and thereafter for 30 minutes, Eat with small bites (1/2 tsp; 1 tsp), Place food on the  right side of the mouth and Check mouth for food residue after snacks and meals with toothbrushing and swabs in addition to tongue sweep, HOLD PO if pt is not alert or unable to tolerate upright position    NAME: Eugenio Mixon  : 1960  MRN: [id number]    Patient Diagnosis(es):   Patient Active Problem List    Diagnosis Date Noted    Peripheral visual field defect, left     [facility]    SOB (shortness of breath) 2021    Dizziness 2021    GERD (gastroesophageal reflux disease) 2021    Lumbar spondylosis 2021    Tobacco use disorder 2021    HTN (hypertension), benign     Dyslipidemia     [facility]    Hypertensive emergency 2021    Hypomagnesemia 2021    Syncope 2021    Smoker 2021    Hypokalemia 2021    Thyroid nodule 2021    Hypertensive urgency 2021    Left lumbar radiculitis 2014    Myofascial muscle pain 2014     Allergies: Allergies   Allergen Reactions    Bee Pollen Swelling    Lisinopril Nausea Only    Varenicline Nausea And Vomiting     Subjective:  RN reports increased drooling from Left lip corner yesterday afternoon and today. She was assisted with feeding her morning meal. Pt lethargic but participated, using humor. Brother Raj Pollard also present at the bedside  More comfortable with lights dimmed due to headache    Pain: headache, chronic, unchanged with head of bed elevated for PO and lowered on SLP departure, RN notified.      Current Diet: ADULT DIET; Dysphagia - Pureed; 3 carb choices (45 gm/meal); Low Fat/Low Chol/High Fiber/2 gm Na (downgrade from [address] [place] today), Thin liquids    Diet Tolerance:  Oral residue in Left lateral sulcus which pt is unable to clear with her tongue, unable to sense spontaneously. No overt s/s aspiration appreciated per RN with PO this morning. P.O. Trials: Thin   X By spoon, straw X6   Nectar / Mildly Thick    --   Honey / Moderately Thick    --   Pudding / Extremely Thick    --   Puree   X X4 applesauce   Solid         Dysphagia Treatment and Impressions:  * SLP cleared small boluses of meat and eggs from mid-tongue blade and Left lateral sulcus. Pt had not sensed this (fed hours ago) and was unable to clear it when cued  * SLP directed small pureed boluses to Right side of the mouth, pt with moderately reduced oral collection and bolus propulsion of pureed boluses. No bolus loss. Pt swallowed 2-3 times per bolus. Pt with throat clearing intermittently when cued to vocalize between swallows, which may indicate laryngeal penetration or residue. * Pt with adequate oral containment of tsp and straw boluses of thin liquid, with delayed swallow onset, reduced oral collection, delayed swallow onset. No overt s/s aspiration appreciated given time between boluses to spontaneously swallow 1-2x    Dysphagia Goals:  Timeframe for Long-term Goals: 5 days (10/9/2021)- Addend Goal to 5 more days (10/14/2021)  Goal 1: Pt will demonstrate clinically safe swallow of least restrictive diet and thin liquids without s/s aspiration. 10/7/2021: Not progressing, downgrade solids today     Short-term Goals  Timeframe for Short-term Goals: 3 days (10/7/2021) Addend goal to 5 more days (10/12/2021)  Dysphagia Goals: * The patient will tolerate recommended diet without observed clinical signs of aspiration 10/7:Addressed, Progressing, continue  * The patient will recall and perform compensatory strategies, with no cues.  10/7: Addressed, Progressing, continue  * The patient/caregiver will demonstrate understanding of compensatory strategies for improved swallowing safety. 10/7: addressed, progressing, continue    Speech/Language/Cog Goals:  Timeframe for Long-term Goals: 5 days (10/9/2021)  Addend Goal to 5 more days (10/14/2021)  Goal 1: Pt will improve cognitive-linguistic skills to increase safety and independence at next level of care. 10/7: Not addressed, Continue  Goal 2: Pt will use strategies to improve speech intelligibility to a level functional to participate in conversation with all listeners. 10/7: not addressed, continue     Short-term Goals  Timeframe for Short-term Goals: 3 days (10/7/57869) Addend goal to 5 more days (10/12/2021)  Goal 1: Pt will complete verbal reasoning and problem solving tasks with 80% acc given min cues`10/7: not addressed, continue  Goal 2: Pt will complete visual scanning tasks at phrase-sentence level with 80% acc given min cues 10/7: not addressed, continue  Goal 3: Pt will achieve 100% speech intelligibility in conversational exchanges using strategies (slowed rate, exaggerated articulation, increased volume) with min cues 10/7: not addressed, continue    Recommendations:  Solid Consistency: Pureed (IDDSI 4)  Liquid Consistency: Thin liquid (IDDSI 0)  Medication: with puree, crushed as allowable    Patient/Family/Caregiver Education:  Rationale for swallow reassessment, diet modifications, compensatory strategies for oral control and to reduce aspiration risk with pt and her brother Angeline Erickson RN    Compensatory Strategies: Sit up for all meals and thereafter for 30 minutes, Eat with small bites (1/2 tsp; 1 tsp), Place food on the  right side of the mouth and Check mouth for food residue after snacks and meals with toothbrushing and swabs in addition to tongue sweep    Plan:    Continued Dysphagia treatment with goals per plan of care.     Discharge Recommendations: Pt will benefit from continued ST at next level of care. Rec 24 hour supervision due to cognitive impairment, dysphagia  If pt discharges from hospital prior to Speech/Swallowing discharge, this note serves as tx and discharge summary. Total Treatment Time / Charges     Time in Time out Total Time / units   Cognitive Tx         Speech Tx      Dysphagia Tx 1135 1200 25 min/ 1 unit     Signature: Rmoa Torres MS CCC-SLP  Speech Language Pathologist   Phone: Esperanza Jolly, SLP

## 2021-10-08 NOTE — PROGRESS NOTES
Lc Rausch  Neurology Follow-up  [facility] Neurology    Date of Service: 10/8/2021    Subjective:   CC: Follow up today regarding: Acute left-sided weakness, confusion, and acute CVA    Events noted. Chart and lab reviewed. Apparently was attempting to get out of bed unassisted overnight. Was placed in restraints. Pleasant and cooperative with me this AM.      ROS : A 10-12 system review obtained and updated today and is unremarkable except as mentioned  in my interval history. Family history: Noncontributory.     Past Medical History:   Diagnosis Date    Hypertension      Current Facility-Administered Medications   Medication Dose Route Frequency Provider Last Rate Last Admin    perflutren lipid microspheres (DEFINITY) injection 1.65 mg  1.5 mL IntraVENous ONCE PRN Latesha Montesinos MD        magnesium sulfate 2000 mg in 50 mL IVPB premix  2,000 mg IntraVENous Once Flor Delgadillo MD        labetalol (NORMODYNE;TRANDATE) injection 10 mg  10 mg IntraVENous Q4H PRN Latesha Montesinos MD        amLODIPine (NORVASC) tablet 10 mg  10 mg Oral Daily Latesha Montesinos MD   10 mg at 10/07/21 1223    HYDROcodone-acetaminophen (NORCO) 5-325 MG per tablet 1 tablet  1 tablet Oral Q6H PRN Daniel Spicer MD   1 tablet at 10/03/21 1406    carvedilol (COREG) tablet 12.5 mg  12.5 mg Oral BID WC Latesha Montesinos MD   12.5 mg at 10/07/21 1806    potassium chloride 10 mEq/100 mL IVPB (Peripheral Line)  10 mEq IntraVENous PRN Marley Salazar, DO        magnesium sulfate 2000 mg in 50 mL IVPB premix  2,000 mg IntraVENous PRN Marley Kitten, DO   Stopped at 10/06/21 0140    promethazine (PHENERGAN) tablet 12.5 mg  12.5 mg Oral Q6H PRN Ahmad A Marino, DO        Or    ondansetron (ZOFRAN) injection 4 mg  4 mg IntraVENous Q6H PRN Leslie Rodas Marino, DO        acetaminophen (TYLENOL) tablet 650 mg  650 mg Oral Q6H PRN Ahmad A Marino, DO   650 mg at 10/06/21 1554    Or    acetaminophen (TYLENOL) suppository 650 mg 650 mg Rectal Q6H PRN Leslie Rodas DO Marino        polyethylene glycol (GLYCOLAX) packet 17 g  17 g Oral Daily PRN Marley Salazar,         aspirin EC tablet 81 mg  81 mg Oral Daily Gusmaira LAZARO aMrino DO   81 mg at 10/07/21 1224    Or    aspirin suppository 300 mg  300 mg Rectal Daily Kevin Pichardo DO        perflutren lipid microspheres (DEFINITY) injection 1.65 mg  1.5 mL IntraVENous ONCE PRN Keagan Pichardo DO        atorvastatin (LIPITOR) tablet 80 mg  80 mg Oral Nightly Kevin LAZARO Marino DO   80 mg at 10/05/21 2054    COVID-19 Ad26 Vaccine ([employer]) injection 0.5 mL  1 Dose IntraMUSCular Prior to discharge Keagan Pichardo DO         Allergies   Allergen Reactions    Bee Pollen Swelling    Lisinopril Nausea Only    Varenicline Nausea And Vomiting      reports that she has been smoking cigarettes. She started smoking about 41 years ago. She has a 20.00 pack-year smoking history. She has never used smokeless tobacco. She reports that she does not drink alcohol and does not use drugs. Objective:  Exam:   Constitutional:   Vitals:    10/07/21 2250 10/08/21 0040 10/08/21 0307 10/08/21 0618   BP: (!) 143/79 (!) 178/85 (!) 173/90 (!) 162/93   Pulse: 76 68 62 73   Resp: 16  14 16   Temp: 97.9 °F (36.6 °C) 98.1 °F (36.7 °C) 98.2 °F (36.8 °C) 98 °F (36.7 °C)   TempSrc: Oral Oral Oral Oral   SpO2: 94% 98% 97% 99%   Weight:       Height:         General appearance:  Normal development and appear in no acute distress. Mental Status:   Oriented to person, place. Follows simple commands. Intact remote memory. Poor attention span and concentration. Language: intact naming, repeating and fluency   Poor fund of Knowledge. Cranial Nerves:   II: Visual fields: Left-sided neglect. Pupils: equal, round, reactive to light  III,IV,VI: Extra Ocular Movements are intact. No nystagmus  V: Facial sensation is intact  VII: Facial strength and movements: left facial droop noted.     IX: Palate elevation is symmetric  XI: Shoulder shrug is intact  XII: Tongue movements are normal  Musculoskeletal: LUE 2/5 LLE 4/5. Right side 5/5. Tone: Normal tone. Coordination: unable to lift left arm to perform drift, FTN  Sensation: left sided neglect  Gait/Posture: did not test gait. Data:  LABS:   Lab Results   Component Value Date     10/08/2021    K 4.3 10/08/2021     10/08/2021    CO2 23 10/08/2021    BUN 35 10/08/2021    CREATININE 0.8 10/08/2021    GFRAA >60 10/08/2021    LABGLOM >60 10/08/2021    GLUCOSE 118 10/08/2021    PHOS 3.6 03/03/2021    MG 1.60 10/05/2021    CALCIUM 9.7 10/08/2021     Lab Results   Component Value Date    WBC 13.9 10/08/2021    RBC 3.79 10/08/2021    HGB 11.3 10/08/2021    HCT 32.2 10/08/2021    MCV 84.9 10/08/2021    RDW 13.3 10/08/2021     10/08/2021     Lab Results   Component Value Date    INR 1.07 10/02/2021    PROTIME 12.1 10/02/2021       Neuroimaging was independently reviewed by me and discussed results with the patient. Repeat CT head reviewed and showed evolution of a large right MCA stroke with mild midline shift. I reviewed blood testing and other test results and discussed results with the patient      Impression:    Acute confusion with left-sided neglect and visual field deficit consistent with new, large, ischemic right MCA stroke, likely thromboembolic from MCA stenosis and occlusion. Repeat head CT 10/6 following syncopal episode revealed evolving right MCA stroke with slightly increased midline shift. ECHO reviewed, no PFO or thrombus. Hypertension, not controlled  Hyperlipidemia  Prediabetes, A1c 5.7. Orthostatic hypotension    Recommendation    Mentation waxes and wanes. Monitor on tele. Continue ASA and statin. Continue BP meds. Goal BP is 160/90 for now. Avoid hypotension, recommend to HOLD BP meds if BP < 140/90. PT/OT/SLP - will need to proceed slowly with therapy to avoid orthostasis.   Consider compression stockings to improve venous return. Prognosis is guarded. Peder Law, CNP        This dictation was generated by voice recognition computer software. Although all attempts are made to edit the dictation for accuracy, there may be errors in the transcription that are not intended.

## 2021-10-08 NOTE — CARE COORDINATION
Discussed patient's status with agitation and restraints with Dr. Jose Bellamy and will continue to follow as patient is not ready today to admit to ARU. Will update , Rhoda Hoff.  Horace Cote RN

## 2021-10-08 NOTE — PROGRESS NOTES
Physician Progress Note      Juan J Telles  [facility] #:                  [id number]  :                       1960  ADMIT DATE:       10/2/2021 6:57 PM  [address] [place] DATE:  RESPONDING  PROVIDER #:        Mary Jo Thompson MD          QUERY TEXT:    Pt admitted with acute CVA. Pt noted to have need for restraints, nursing   documentation of pt being \"very drowsy\" and \"unable to complete neuro   assessment\" . If possible, please document in the progress notes and discharge   summary if you are evaluating and / or treating any of the following: The medical record reflects the following:  Risk Factors: acute CVA  Clinical Indicators: nursing note 10/6 \"Pt keeps taking hospital gown off, RN   has replaced multiple times, IV wrapped with bandage as well due to pt picking   at it in hopes of distracting her from it. Gown replaced again. \" \"Patient not   interacting, very drowsy, and unable to complete a neuro assessment. Patient   had difficulty following commands. Patient was incontinent of bladder. \",   10/8 \"Pt agitated, restless, unable to follow commands, and was continuously   trying to get out of bed without assistance. Patient placed in BL wrist   restraints at 2103 on 10/07/2021. \" CT 10/6 sh  Treatment: restraints, monitoring and supportive care, CT, neuro c/s    Thank you  Kathy Fermin RN, CCDS, CRCR  [email or web address]  Options provided:  -- Metabolic encephalopathy dt/ CVA  -- Encephalopathy d/t, please specify. -- Other - I will add my own diagnosis  -- Disagree - Not applicable / Not valid  -- Disagree - Clinically unable to determine / Unknown  -- Refer to Clinical Documentation Reviewer    PROVIDER RESPONSE TEXT:    This pt has encephalopathy d/t CVA    Query created by: Victor M Stovall on 10/8/2021 11:37 AM      QUERY TEXT:    Pt admitted with acute CVA.   Pt noted to have \"midline shift measuring 2mm\" on   CT 10/2 and \"aging or evolving infarct on the right, with slightly increase   midline shift\" per repeat CT on 10/6/21  If clinically significant, please   document in progress notes and discharge summary if you are   evaluating/treating any of the following: The medical record reflects the following:  Risk Factors: acute CVA  Clinical Indicators: CT 10/2 \"Sulcal effacement throughout the right cerebral   hemisphere with partial effacement of the right lateral and 3rd ventricles and   minimal right left midline shift measuring 2 mm\", CT 10/6 \"There is mass   effect on the right lateral ventricle. There is midline shift to the left by   approximately 5 mm, previously 2-3 mm.\", documentation of confusion,   difficulty arousing pt at times and unable to complete neuro assessments per   nursing, neuro c/s \"mentation waxes and wanes\"  Treatment: neuro c/s, imaging, monitoring and supportive care    Thank you  Kristin Sepulveda RN, CRCR, CCDS  [email or web address]  Options provided:  -- Cerebral edema  -- Brain compression  -- Cerebral edema and Brain compression  -- Other - I will add my own diagnosis  -- Disagree - Not applicable / Not valid  -- Disagree - Clinically unable to determine / Unknown  -- Refer to Clinical Documentation Reviewer    PROVIDER RESPONSE TEXT:    This patient has cerebral edema and brain compression.     Query created by: Clara Cronin on 10/8/2021 11:43 AM      Electronically signed by:  Ryan Kate MD 10/8/2021 2:51 PM

## 2021-10-08 NOTE — PROGRESS NOTES
Comprehensive Nutrition Assessment    Type and Reason for Visit:  Initial, RD Nutrition Re-Screen/LOS    Nutrition Recommendations/Plan:   1. Diet texture per SLP- pureed diet with thin liquids  2. RD to liberalize diet to PROSPER diet to increase PO intake  3. Encourage PO intake and assist with meals as needed. 4. RD to add ensure with meals  5. Monitor nutrition adequacy, pertinent labs, bowel habits, wt changes, and clinical progress    Nutrition Assessment:  LOS assessment: Pt admitted after large RMCA subacute ischemic stroke. SLP following, recommends pureed diet with thin liquids. Previously on cardiac restricted diet with carb control, pt has no history of diabetes and BG WNL. RD to liberalize diet to no added salt diet. PO intakes vary %. Pt asleep at time of visit, family member in room. Reports poor PO intake, only a few bites of food. Pt had good intake and no weight loss PTA. Willing to trial ONS, RD to add glucerna with meals. Will continue to monitor. Malnutrition Assessment:  Malnutrition Status: At risk for malnutrition (Comment)    Context:  Acute Illness     Findings of the 6 clinical characteristics of malnutrition:  Energy Intake:  7 - 50% or less of estimated energy requirements for 5 or more days    Estimated Daily Nutrient Needs:  Energy (kcal):  1062-5080 kcals/day; Weight Used for Energy Requirements:  Current (89 kg)     Protein (g):  55-66 g/day; Weight Used for Protein Requirements:  Ideal (1-1.2 g/kg)        Method Used for Fluid Requirements:  1 ml/kcal      Nutrition Related Findings:  Labs reviewed. + 2 BM yesterday. Wounds:  Multiple (scattered bruising and abrasions)       Current Nutrition Therapies:    ADULT DIET; Dysphagia - Pureed;  No Added Salt (3-4 gm)  Adult Oral Nutrition Supplement; Standard High Calorie/High Protein Oral Supplement    Anthropometric Measures:  · Height: 5' 4\" (162.6 cm)  · Current Body Weight: 196 lb (88.9 kg)    · Ideal Body Weight: 120 lbs; % Ideal Body Weight 163.3 %   · BMI: 33.6  · BMI Categories: Obese Class 1 (BMI 30.0-34. 9)       Nutrition Diagnosis:   · Inadequate oral intake related to swallowing difficulty, cognitive or neurological impairment as evidenced by intake 0-25%, swallow study results, intake 26-50%    Nutrition Interventions:   Food and/or Nutrient Delivery:  Modify Current Diet, Start Oral Nutrition Supplement  Nutrition Education/Counseling:  Education not indicated   Coordination of Nutrition Care:  Continue to monitor while inpatient    Goals:  Consume 50% or greater of 3 meals per day and ONS during this admission.      Nutrition Monitoring and Evaluation:   Behavioral-Environmental Outcomes:  None Identified   Food/Nutrient Intake Outcomes:  Food and Nutrient Intake, Diet Advancement/Tolerance, Supplement Intake  Physical Signs/Symptoms Outcomes:  Chewing or Swallowing     Discharge Planning:    Continue current diet, Continue Oral Nutrition Supplement     Electronically signed by Raymond Vidal MS, RD, LD on 10/8/21 at 2:19 PM EDT    Contact: Office: [phone]; [address] [place] [address] Road: [phone]

## 2021-10-08 NOTE — PROGRESS NOTES
Pt agitated, restless, unable to follow commands, and was continuously trying to get out of bed without assistance. Patient placed in BL wrist restraints at 2103 on 10/07/2021. Patients family, sister Aracelis Brine notified.

## 2021-10-08 NOTE — PLAN OF CARE
Nutrition Problem #1: Inadequate oral intake  Intervention: Food and/or Nutrient Delivery: Modify Current Diet, Start Oral Nutrition Supplement  Nutritional Goals: Consume 50% or greater of 3 meals per day and ONS during this admission.

## 2021-10-08 NOTE — CARE COORDINATION
CM note that pt now has pending medicaid. [facility] following for [facility] admission and pt has been out of restraints since 0900 today worked with OT. Spoke to trent in admissions. Spoke to sister Liset Hernandez on the phone and she feels that the ARU is the best plan for pt. However if pt is unable to dc to [facility] she would like referrals to [place] [address]. [address] and [employer]. If pt is not safe to come home with [facility]..  MERT Fishman, RN

## 2021-10-08 NOTE — PROGRESS NOTES
Hospitalist Progress Note      PCP: Chiara Sofia, APRN - CNP    Date of Admission: 10/2/2021    Chief Complaint: vision changes       Subjective:  Sundowned overnight, was put in restraints. Now drowsy but fully oriented and cooperative. No complaints. Medications:  Reviewed    Infusion Medications   Scheduled Medications    magnesium sulfate  2,000 mg IntraVENous Once    amLODIPine  10 mg Oral Daily    carvedilol  12.5 mg Oral BID WC    aspirin  81 mg Oral Daily    Or    aspirin  300 mg Rectal Daily    atorvastatin  80 mg Oral Nightly    covid-19 vaccine  1 Dose IntraMUSCular Prior to discharge     PRN Meds: perflutren lipid microspheres, labetalol, HYDROcodone 5 mg - acetaminophen, potassium chloride, magnesium sulfate, [DISCONTINUED] promethazine **OR** ondansetron, acetaminophen **OR** acetaminophen, polyethylene glycol, perflutren lipid microspheres      Intake/Output Summary (Last 24 hours) at 10/8/2021 1301  Last data filed at 10/7/2021 1413  Gross per 24 hour   Intake 0 ml   Output --   Net 0 ml       Physical Exam Performed:    /85   Pulse 74   Temp 97 °F (36.1 °C) (Axillary)   Resp 16   Ht 5' 4\" (1.626 m)   Wt 196 lb 6.9 oz (89.1 kg)   SpO2 98%   BMI 33.72 kg/m²     General appearance: No apparent distress, appears stated age. HEENT: Pupils equal, round, and reactive to light. Conjunctivae/corneas clear. Neck: Supple, with full range of motion. No jugular venous distention. Trachea midline. Respiratory:  Normal respiratory effort. Clear to auscultation, bilaterally without Rales/Wheezes/Rhonchi. Cardiovascular: Regular rate and rhythm with normal S1/S2 without murmurs, rubs or gallops. Abdomen: Soft, non-tender, non-distended with normal bowel sounds. Musculoskeletal: No clubbing, cyanosis or edema bilaterally. Full range of motion without deformity. Skin: Skin color, texture, turgor normal.  No rashes or lesions.   Neurologic:  She still has the L visual field defect. She can barely lift her L hand off the mattress. Says she has normal sensation. L facial droop. R gaze preference. Psychiatric: drowsy, mostly oriented, thought content appropriate, has insight. She does seem to have some cognitive impairment. Capillary Refill: Brisk,3 seconds, normal   Peripheral Pulses: +2 palpable, equal bilaterally       Labs:   Recent Labs     10/07/21  0619 10/08/21  0529   WBC 14.6* 13.9*   HGB 12.0 11.3*   HCT 35.4* 32.2*    272     Recent Labs     10/05/21  2247 10/07/21  0619 10/08/21  0529   * 134* 138   K 4.7 4.1 4.3   CL 97* 102 100   CO2 23 18* 23   BUN 30* 43* 35*   CREATININE 0.9 0.7 0.8   CALCIUM 9.7 9.4 9.7     No results for input(s): AST, ALT, BILIDIR, BILITOT, ALKPHOS in the last 72 hours. No results for input(s): INR in the last 72 hours. No results for input(s): [place] in the last 72 hours. Urinalysis:      Lab Results   Component Value Date    NITRU Negative 10/06/2021    WBCUA 6-9 10/06/2021    BACTERIA Rare 10/06/2021    RBCUA 5-10 10/06/2021    BLOODU Negative 10/06/2021    SPECGRAV 1.020 10/06/2021    GLUCOSEU Negative 10/06/2021       Radiology:  CT HEAD WO CONTRAST   Final Result   Aging or evolving infarct on the right, with slight increase in midline shift   to the left. No obvious hemorrhage noted. Nodularity posterior nasopharynx, similar to prior. MRI BRAIN WO CONTRAST   Preliminary Result   Unchanged subacute right MCA infarct. XR CHEST PORTABLE   Final Result   No acute cardiopulmonary abnormality. CTA HEAD NECK W CONTRAST   Final Result   1. Occlusion of the right middle cerebral artery at the origin may be acute. Overall decreased size and number of enhancing right middle cerebral artery   branches in comparison to the left. This correlates with findings of large   acute/subacute right middle cerebral artery territory infarct.       2.  Short segment high-grade stenosis of a left middle cerebral artery M2   branch. Mild-to-moderate stenosis in the left middle cerebral artery M1   segment. Diffuse atherosclerotic irregularity of the more distal left middle   cerebral artery branches. 3.  The bilateral posterior cerebral arteries are diffusely small and not   well seen, underlying disease is not excluded. 4.  No hemodynamically significant stenosis in the bilateral cervical   internal carotid arteries per NASCET criteria. Bilateral carotid bulb   atherosclerotic plaque. 5.  Patent bilateral vertebral arteries. Critical results were called by Dr. Robert Fiore to Dr. Kenrick Cronin on   10/2/2021 at 19:49. This scan was analyzed using Viz. ai contact LVO. Identification of suspected   findings is not for diagnostic use beyond notification. Viz LVO is limited to   analysis of imaging data and should not be used in-lieu of full patient   evaluation or relied upon to make or confirm diagnosis. CT HEAD WO CONTRAST   Final Result   1. Large acute/subacute stroke in the right middle cerebral artery   territory. No hemorrhagic conversion. 2.  Sulcal effacement throughout the right cerebral hemisphere with partial   effacement of the right lateral and 3rd ventricles and minimal right left   midline shift measuring 2 mm. 3.  Chronic small vessel ischemic disease. Old left thalamic and basal   ganglia lacunar infarcts are similar to the prior study. Critical results were called by Dr. Robert Fiore to Dr. Kenrick Cronin on   10/2/2021 at 19:29.                  Assessment/Plan:    Active Hospital Problems    Diagnosis     Peripheral visual field defect, left [H53.452]     Acute ischemic stroke ([facility] [address] [I63.9]     Hypertensive emergency [I16.1]        64 Y F with a h/o smoking, obesity, HTN, and HLD presented with one day of left-sided vision changes, dropping things with her left hand, and LUE paresthesias.          Large RMCA subacute ischemic stroke  - probably thromboembolic.  Her CTA showed an occlusion of the origin of the RMCA, also significant stenoses of the LMCA. - aspirin, statin  - PT/OT  - repeat head CT did show that as the infarct evolved there was some mass effect on the R lateral ventricle and the midline shift had gone from 2-3 mm to 5 mm. No seizure activity during her syncopal spells. - appreciate neurology input     Severe HTN  - eventual BP control.  OK to let it gradually come down over the next few days in light of her stroke and syncopal spells.    - her amlodipine and carvedilol were resumed over the weekend.  BP stayed very high.  Added chlorthalidone, but then her Na decreased, BUN increased, and she had a syncopal spell, so this was stopped overnight. Could start low-dose losartan in the near future.     Syncopal episodes on 10/5 and 10/6. Probably vasovagal since both episodes occurred on the toilet. No events on tele. - will get another TTE to re-evaluate LV function    Delirium with behavioral disturbance. Supportive care. Avoid restraints and sedatives if possible. - f/u UA     HLD.  Statin.     Obesity.  Encouraged diet and exercise as able. DVT Prophylaxis: enoxaparin  Diet: ADULT DIET; Dysphagia - Pureed; 4 carb choices (60 gm/meal); No Added Salt (3-4 gm)  Code Status: Full Code    PT/OT Eval Status: rec'd IPR    Dispo - suspect that she will be ready for discharge on 10/10 ([facility] doesn't accept on Saturdays) if she doesn't require restraints again. She has no payor source and [facility] admission.       Betsy Dawkins MD

## 2021-10-08 NOTE — PROGRESS NOTES
medical history of Hypertension. has a past surgical history that includes Hysterectomy and Cholecystectomy. Restrictions  Restrictions/Precautions  Restrictions/Precautions: General Precautions, Fall Risk, Up as Tolerated, Seizure  Position Activity Restriction  Other position/activity restrictions: [employer], tele     Subjective   General  Chart Reviewed: Yes, Imaging, History and Physical  Patient assessed for rehabilitation services?: Yes  Response to previous treatment: Patient with no complaints from previous session  Family / Caregiver Present: Yes  Referring Practitioner: Dr. Fam Bullock  Diagnosis: CVA with Left side hemiparesis, Left visual neglect    Subjective  Subjective: Pt resting in bed, positioned almost perpendicular in bed, lethargic but agreeable to trial OT treatment. Vital Signs  Patient Currently in Pain:  (LISA, no c/o made during session)     Orientation  Orientation  Orientation Level: Oriented to person (difficult to assess due to lethargy)     Objective    ADL  Grooming: Dependent/Total (to brush hair seated EOB)  LE Dressing: Dependent/Total (socks)     Balance  Sitting Balance: Maximum assistance (leaning heavily to L)  Standing Balance: Dependent/Total (on Melinda STEDY heavy lean to L)  Standing Balance  Activity: dependent of 2 on Melinda STEDY    Bed mobility  Supine to Sit: Dependent/Total (max A of 2)  Sit to Supine: Dependent/Total;2 Person assistance     Transfers  Sit to stand: Dependent/Total;2 Person assistance  Stand to sit: Dependent/Total;2 Person assistance     Cognition  Overall Cognitive Status: Exceptions  Arousal/Alertness: Delayed responses to stimuli;Inconsistent responses to stimuli  Following Commands: Inconsistently follows commands  Attention Span: Attends with cues to redirect; Unable to maintain attention  Safety Judgement: Decreased awareness of need for safety;Decreased awareness of need for assistance  Insights: Decreased awareness of deficits  Initiation: Requires cues for all  Sequencing: Requires cues for all     Perception  Overall Perceptual Status: Impaired  Unilateral Attention: Cues to attend to left side of body;Cues to attend left visual field;Cues to maintain midline in sitting;Cues to maintain midline in standing     Plan   Plan  Times per week: 3-5x/ week  Current Treatment Recommendations: Strengthening, ROM, Balance Training, Functional Mobility Training, Safety Education & Training, Positioning, Self-Care / ADL, Neuromuscular Re-education, Cognitive/Perceptual Training, Patient/Caregiver Education & Training    AM-PAC Score  AM-PAC Inpatient Daily Activity Raw Score: 7 (10/08/21 [facility]9)  AM-PAC Inpatient ADL T-Scale Score : 20.13 (10/08/21 1359)  ADL Inpatient CMS 0-100% Score: 92.44 (10/08/21 1359)  ADL Inpatient CMS G-Code Modifier : CM (10/08/21 1359)    Goals  Short term goals  Time Frame for Short term goals: 1 week(10-10-21)-ongoing as of 10/04  Short term goal 1: min assist with LE self care by 10-10-21--ongoing 10/8/21  Short term goal 2: min assist with functional/toilet transfers by 10-08-21-- GOAL NOT MET, pt dependent of 2 for transfers 10/8/21  Short term goal 3: min assist with bathroom mobility with LRAD (Least Restrictive Assistive Device)--ongoing 10/8/21  Short term goal 4: supervision with 10 reps LUE self ROM/AROM--ongoing 10/8/21  Patient Goals   Patient goals : go home when able       Therapy Time   Individual Concurrent Group Co-treatment   Time In 1305         Time Out 1358         Minutes 301 SHELBIE Lozano/L

## 2021-10-08 NOTE — CARE COORDINATION
MERT received VM from Johnna Haro in admissions with [facility] about pt now being in 2 point restraints and not following commands, [facility] still following. Will not accept pt today, until behaviors are more appropriate. Pt has no payor and [facility] following for [facility] admission. Spoke to Lou and updated that restraints just removed.  MERT Cooper RN

## 2021-10-08 NOTE — PROGRESS NOTES
departure, RN notified. Current Diet: ADULT DIET; Dysphagia - Pureed; 4 carb choices (60 gm/meal); No Added Salt (3-4 gm), Thin liquids    Diet Tolerance:  Pt is tolerating recommended diet without overt s/s aspiration, rec removing straws as an aspiration precaution    P.O. Trials: Thin   X Straw X2, cup X4   Nectar / Mildly Thick    --   Honey / Moderately Thick    --   Pudding / Extremely Thick    --   Puree   X X4 jello (formed puree)   Solid    Not attempted     Dysphagia Treatment and Impressions:  * SLP directed small formed ureed boluses to Right side of the mouth. Pt demonstrated volitional strong lip seal around the spoon. After the second bolus, pt with moderate Left sided solid residue, unable to clear with lingual sweeps. Removed with swab. Pt noted to swallow spontaneously 2-3 times per bolus. Needs extra time to do this during meals  * Pt with adequate oral containment of cup and straw boluses of thin liquid, with delayed swallow onset. Immediate cough response with thin liquid by straw. Pt self-fed single sips of thin liquid by cup without overt s/s aspiration when given time between boluses to spontaneously swallow 1-2x  * Lip strength exercise: lip press with resistance with 5 sec hold X5, mod cues. No perceived L lip retraction with smile attempt  * Lingual ROM exercise: lingual sweep X5 upper and X5 lower, mod cues. ~50% ROM to the Left with lowering to lower Left lateral sulcus more difficult than elevating to Left upper lateral sulcus    Dysphagia Goals:  Timeframe for Long-term Goals: 10 days (10/14/2021)  Goal 1: Pt will demonstrate clinically safe swallow of least restrictive diet and thin liquids without s/s aspiration. 10/8/2021: met with feed assist and compensatory swallow strategies, ongoing     Short-term Goals  Timeframe for Short-term Goals: 8 days (10/12/2021)  Dysphagia Goals:    * The patient will tolerate recommended diet without observed clinical signs of aspiration the mouth and Check mouth for food residue after snacks and meals with toothbrushing and swabs in addition to tongue sweep, no straw    Plan:    Continued Dysphagia treatment with goals per plan of care. Discharge Recommendations: Pt will benefit from continued ST at next level of care. Rec 24 hour supervision due to cognitive impairment, dysphagia  If pt discharges from hospital prior to Speech/Swallowing discharge, this note serves as tx and discharge summary. Total Treatment Time / Charges     Time in Time out Total Time / units   Cognitive Tx         Speech Tx 1120 1130 10 min/ 1 unit   Dysphagia Tx 1130 1245 15 min/ 1 unit     Signature: Roma Torres MS CCC-SLP  Speech Language Pathologist   Phone: Esperanza Jolly, CLAUDIA

## 2021-10-09 NOTE — PROGRESS NOTES
Perfect serve Dr. Johnson [place]: [phone] patient fell- slid out of bed onto floor, witnessed by AVASIS- did NOT hit head. Vitals stable. May need order for restraints.  Thanks Need

## 2021-10-09 NOTE — PROGRESS NOTES
Pt placed in restraints due to noncompliant behavior,restless, confused and agitated. Pt constantly trying to get out of bed, avasys in place. VSS.

## 2021-10-09 NOTE — PROGRESS NOTES
Hospitalist Progress Note      PCP: Evelin Sofia, APRN - CNP    Date of Admission: 10/2/2021    Chief Complaint: vision changes       Subjective:  After being taken out of restraints yesterday she slid out of bed this AM despite avasys monitoring. Medications:  Reviewed    Infusion Medications   Scheduled Medications    magnesium sulfate  2,000 mg IntraVENous Once    amLODIPine  10 mg Oral Daily    carvedilol  12.5 mg Oral BID WC    aspirin  81 mg Oral Daily    Or    aspirin  300 mg Rectal Daily    atorvastatin  80 mg Oral Nightly    covid-19 vaccine  1 Dose IntraMUSCular Prior to discharge     PRN Meds: perflutren lipid microspheres, labetalol, HYDROcodone 5 mg - acetaminophen, potassium chloride, magnesium sulfate, [DISCONTINUED] promethazine **OR** ondansetron, acetaminophen **OR** acetaminophen, polyethylene glycol, perflutren lipid microspheres      Intake/Output Summary (Last 24 hours) at 10/9/2021 1129  Last data filed at 10/8/2021 1506  Gross per 24 hour   Intake 0 ml   Output --   Net 0 ml       Physical Exam Performed:    BP (!) 149/95   Pulse 66   Temp 97.2 °F (36.2 °C) (Axillary)   Resp 18   Ht 5' 4\" (1.626 m)   Wt 196 lb 6.9 oz (89.1 kg)   SpO2 96%   BMI 33.72 kg/m²     General appearance: No apparent distress, appears stated age. HEENT: Pupils equal, round, and reactive to light. Conjunctivae/corneas clear. Neck: Supple, with full range of motion. No jugular venous distention. Trachea midline. Respiratory:  Normal respiratory effort. Clear to auscultation, bilaterally without Rales/Wheezes/Rhonchi. Cardiovascular: Regular rate and rhythm with normal S1/S2 without murmurs, rubs or gallops. Abdomen: Soft, non-tender, non-distended with normal bowel sounds. Musculoskeletal: No clubbing, cyanosis or edema bilaterally. Full range of motion without deformity. Skin: Skin color, texture, turgor normal.  No rashes or lesions.   Neurologic:  She still has the L visual field defect. She can barely lift her L hand off the mattress. Says she has normal sensation. L facial droop. R gaze preference. Psychiatric: drowsy, mostly oriented but now she cannot tell me where she is (first time this has happened), has limited insight. Capillary Refill: Brisk,3 seconds, normal   Peripheral Pulses: +2 palpable, equal bilaterally       Labs:   Recent Labs     10/07/21  0619 10/08/21  0529   WBC 14.6* 13.9*   HGB 12.0 11.3*   HCT 35.4* 32.2*    272     Recent Labs     10/07/21  0619 10/08/21  0529   * 138   K 4.1 4.3    100   CO2 18* 23   BUN 43* 35*   CREATININE 0.7 0.8   CALCIUM 9.4 9.7     No results for input(s): AST, ALT, BILIDIR, BILITOT, ALKPHOS in the last 72 hours. No results for input(s): INR in the last 72 hours. No results for input(s): Rebekah [place] in the last 72 hours. Urinalysis:      Lab Results   Component Value Date    NITRU Negative 10/06/2021    WBCUA 6-9 10/06/2021    BACTERIA Rare 10/06/2021    RBCUA 5-10 10/06/2021    BLOODU Negative 10/06/2021    SPECGRAV 1.020 10/06/2021    GLUCOSEU Negative 10/06/2021       Radiology:  CT HEAD WO CONTRAST   Final Result   Aging or evolving infarct on the right, with slight increase in midline shift   to the left. No obvious hemorrhage noted. Nodularity posterior nasopharynx, similar to prior. MRI BRAIN WO CONTRAST   Final Result   Unchanged subacute right MCA infarct. XR CHEST PORTABLE   Final Result   No acute cardiopulmonary abnormality. CTA HEAD NECK W CONTRAST   Final Result   1. Occlusion of the right middle cerebral artery at the origin may be acute. Overall decreased size and number of enhancing right middle cerebral artery   branches in comparison to the left. This correlates with findings of large   acute/subacute right middle cerebral artery territory infarct. 2.  Short segment high-grade stenosis of a left middle cerebral artery M2   branch. CTA showed an occlusion of the origin of the RMCA, also significant stenoses of the LMCA. - aspirin, statin  - PT/OT  - repeat head CT did show that as the infarct evolved there was some mass effect on the R lateral ventricle and the midline shift had gone from 2-3 mm to 5 mm. No seizure activity during her syncopal spells. - appreciate neurology input     Severe HTN  - eventual BP control.  OK to let it gradually come down over the next few days in light of her stroke and syncopal spells.    - her amlodipine and carvedilol were resumed over the weekend.  BP stayed very high.  Added chlorthalidone, but then her Na decreased, BUN increased, and she had a syncopal spell, so this was stopped overnight. Could start low-dose losartan in the near future.     Syncopal episodes on 10/5 and 10/6. Probably vasovagal since both episodes occurred on the toilet. No events on tele. LV function fine on TTE. Delirium with behavioral disturbance. Supportive care. Avoid restraints and sedatives if possible. - f/u UA     HLD.  Statin.     Obesity.  Encouraged diet and exercise as able. DVT Prophylaxis: enoxaparin  Diet: Adult Oral Nutrition Supplement; Standard High Calorie/High Protein Oral Supplement  ADULT DIET; Dysphagia - Pureed; No Added Salt (3-4 gm); No Drinking Straws  Code Status: Full Code    PT/OT Eval Status: rec'd IPR    Dispo - she will be ready for discharge when her behavior does not require restraints. The plan had been for the ARU. I also asked CM to look into SNF options.        Laure England MD

## 2021-10-09 NOTE — PROGRESS NOTES
Lucie Duane  Neurology Follow-up  [facility] Neurology    Date of Service: 10/9/2021    Subjective:   CC: Follow up today regarding: Acute left-sided weakness, confusion, and acute CVA    Events noted. Chart and lab reviewed. The patient is awake alert today. Still with the right gaze preference and left-sided weakness and neglect. Denies any headache or chest pain or nausea and vomiting. Waxing and waning but better compared to 2 days ago according to her brother. ROS : A 10-12 system review obtained and updated today and is unremarkable except as mentioned  in my interval history. Family history: Noncontributory.     Past Medical History:   Diagnosis Date    Hypertension      Current Facility-Administered Medications   Medication Dose Route Frequency Provider Last Rate Last Admin    perflutren lipid microspheres (DEFINITY) injection 1.65 mg  1.5 mL IntraVENous ONCE PRN Cait Florentino MD        magnesium sulfate 2000 mg in 50 mL IVPB premix  2,000 mg IntraVENous Once Lianet Escobedo MD        labetalol (NORMODYNE;TRANDATE) injection 10 mg  10 mg IntraVENous Q4H PRN Cait Florentino MD        amLODIPine (NORVASC) tablet 10 mg  10 mg Oral Daily Cait Florentino MD   10 mg at 10/09/21 1043    HYDROcodone-acetaminophen (NORCO) 5-325 MG per tablet 1 tablet  1 tablet Oral Q6H PRN Teressa Benitez MD   1 tablet at 10/09/21 0203    carvedilol (COREG) tablet 12.5 mg  12.5 mg Oral BID WC Cait Florentino MD   12.5 mg at 10/09/21 1043    potassium chloride 10 mEq/100 mL IVPB (Peripheral Line)  10 mEq IntraVENous PRN Lafonda Situ, DO        magnesium sulfate 2000 mg in 50 mL IVPB premix  2,000 mg IntraVENous PRN Lafonda Situ, DO   Stopped at 10/06/21 0140    ondansetron (ZOFRAN) injection 4 mg  4 mg IntraVENous Q6H PRN Kayli Pichardo,         acetaminophen (TYLENOL) tablet 650 mg  650 mg Oral Q6H PRN Kevin Pichardo, DO   650 mg at 10/06/21 1554    Or    acetaminophen (TYLENOL) suppository 650 mg  650 mg Rectal Q6H PRN Juan M WILLIS Marino DO        polyethylene glycol (GLYCOLAX) packet 17 g  17 g Oral Daily PRN Stephan Montez DO        aspirin EC tablet 81 mg  81 mg Oral Daily Kevin Charlesglenroy DO   81 mg at 10/09/21 1043    Or    aspirin suppository 300 mg  300 mg Rectal Daily Angelicaira LAZARO Marino DO        perflutren lipid microspheres (DEFINITY) injection 1.65 mg  1.5 mL IntraVENous ONCE PRN Juan Mantwan WILLIS Marino DO        atorvastatin (LIPITOR) tablet 80 mg  80 mg Oral Nightly Ahmad LAZARO WalterMarino DO   80 mg at 10/09/21 0203    COVID-19 Ad26 Vaccine ([employer]) injection 0.5 mL  1 Dose IntraMUSCular Prior to discharge Juan M WILLIS Marino DO         Allergies   Allergen Reactions    Bee Pollen Swelling    Lisinopril Nausea Only    Varenicline Nausea And Vomiting      reports that she has been smoking cigarettes. She started smoking about 41 years ago. She has a 20.00 pack-year smoking history. She has never used smokeless tobacco. She reports that she does not drink alcohol and does not use drugs. Objective:  Exam:   Constitutional:   Vitals:    10/09/21 0239 10/09/21 0901 10/09/21 1035 10/09/21 1226   BP: (!) 158/61 (!) 184/85 (!) 149/95 (!) 152/93   Pulse:  94 66 71   Resp: 18  18 16   Temp: 94.1 °F (34.5 °C)  97.2 °F (36.2 °C) 98.1 °F (36.7 °C)   TempSrc: Axillary  Axillary Axillary   SpO2:  97% 96% 98%   Weight:       Height:         General appearance:  Normal development and appear in no acute distress. Mental Status: No changes today. Oriented to person, place. Follows simple commands. Intact remote memory. Poor attention span and concentration. Language: intact naming, repeating and fluency   Poor fund of Knowledge. Cranial Nerves: No changes  II: Visual fields: Left-sided neglect. Pupils: equal, round, reactive to light  III,IV,VI: Extra Ocular Movements are intact. No nystagmus  V: Facial sensation is intact  VII: Facial strength and movements: left facial droop noted. XII: Tongue movements are normal  Musculoskeletal: LUE 2/5 LLE 4/5. Right side 5/5. Tone: Normal tone. Coordination: unable to lift left arm to perform drift, FTN  Sensation: left sided neglect  Gait/Posture: did not test gait. Exam no change. Data:  LABS:   Lab Results   Component Value Date     10/08/2021    K 4.3 10/08/2021     10/08/2021    CO2 23 10/08/2021    BUN 35 10/08/2021    CREATININE 0.8 10/08/2021    GFRAA >60 10/08/2021    LABGLOM >60 10/08/2021    GLUCOSE 118 10/08/2021    PHOS 3.6 03/03/2021    MG 1.60 10/05/2021    CALCIUM 9.7 10/08/2021     Lab Results   Component Value Date    WBC 13.9 10/08/2021    RBC 3.79 10/08/2021    HGB 11.3 10/08/2021    HCT 32.2 10/08/2021    MCV 84.9 10/08/2021    RDW 13.3 10/08/2021     10/08/2021     Lab Results   Component Value Date    INR 1.07 10/02/2021    PROTIME 12.1 10/02/2021       Reviewed blood test and notes from different physician    Impression: No change compared to yesterday. Acute confusion with left-sided neglect and visual field deficit consistent with new, large, ischemic right MCA stroke, likely thromboembolic from MCA stenosis and occlusion. Repeat head CT 10/6 following syncopal episode revealed evolving right MCA stroke with slightly increased midline shift. ECHO reviewed, no PFO or thrombus. Hypertension, not controlled  Hyperlipidemia  Prediabetes, A1c 5.7. Orthostatic hypotension    Recommendation    Continue current supportive care  PT and OT  Avoid hypotension  Speech evaluation  Blood pressure control  DVT and GI prophylaxis  Insulin sliding scale  Repeat CT head in 7 days or sooner if neuro status changes  Inpatient rehab when medically stable  Discussed with her family  MDM high due to large ischemic stroke with delayed recovery and potential for high morbidity. This dictation was generated by voice recognition computer software.  Although all attempts are made to edit the dictation for

## 2021-10-09 NOTE — PROGRESS NOTES
Speech Language Pathology  Facility/Department: [facility] C5 - MED SURG/ORTHO  Dysphagia Daily Treatment Note    Recommendations:  Solid Consistency: Pureed (IDDSI 4)  Liquid Consistency: Thin liquid (IDDSI 0)- small sips, No straw  Medication: with puree, crushed as allowable    Compensatory Strategies: Sit up for all meals and thereafter for 30 minutes, Eat with small bites (1/2 tsp; 1 tsp), No Straw, Place food on the  right side of the mouth and Check mouth for food residue after snacks and meals with toothbrushing and swabs in addition to tongue sweep, HOLD PO if pt is not alert or unable to tolerate upright position    NAME: Diony Cerda  : 1960  MRN: [id number]    Patient Diagnosis(es):   Patient Active Problem List    Diagnosis Date Noted    Peripheral visual field defect, left     Acute ischemic stroke ([facility] [address] 10/02/2021    SOB (shortness of breath) 2021    Dizziness 2021    GERD (gastroesophageal reflux disease) 2021    Lumbar spondylosis 2021    Tobacco use disorder 2021    HTN (hypertension), benign     Dyslipidemia     CVA (cerebral vascular accident) ([facility] [address] 2021    Hypertensive emergency 2021    Hypomagnesemia 2021    Syncope 2021    Smoker 2021    Hypokalemia 2021    Thyroid nodule 2021    Hypertensive urgency 2021    Left lumbar radiculitis 2014    Myofascial muscle pain 2014     Allergies: Allergies   Allergen Reactions    Bee Pollen Swelling    Lisinopril Nausea Only    Varenicline Nausea And Vomiting     Subjective: Upon SLP entry, pt lying flat in bed. She woke to SLP's voice, however, required cues for adequate MATT. RN assisted SLP with repositioning pt upright in bed. Pt currently in soft bilateral wrist restraints d/t reported fall from bed earlier this AM.  AVASYS in room.       Pain: pt reported upper back pain when repositioned upright in bed; relieved when repositioned     Current Diet: ADULT DIET; Dysphagia - Pureed; No Added Salt (3-4 gm)  Adult Oral Nutrition Supplement; Standard High Calorie/High Protein Oral Supplement, Thin liquids    Diet Tolerance:  Pt is tolerating recommended diet without overt s/s aspiration per chart. .    P.O. Trials: Thin   X Cup sip x3, pt declined further PO trials despite encouragement. Pt sliding down in bed d/t reported back pain, SLP repositioned bed lower. SLP encouraged remaining at 30 degrees as aspiration precaution. Pt continued to slide down in bed despite cues / education. Nectar / Mildly Thick    --   Honey / Moderately Thick    --   Pudding / Extremely Thick    --   Puree    Pt declined   Solid    Pt declined     Dysphagia Treatment and Impressions:  Upon SLP entry, pt agreeable to thin liquid trials stating she was \"thirsty\". Pt's breakfast tray also at bedside. Pt declined all solid PO trials despite encouragement. Pt observed with minimal PO trials this date, sliding down in bed despite cues after 3 trials of TL via cup only. Pt required total assistance from SLP with cup sips d/t current soft bilateral wrist restraints. SLP provided cues to form adequate labial seal on cup. Minimal anterior spillage noted on L with all trials. Further PO trials discontinued d/t pt's poor positioning. Dysphagia Goals:  Timeframe for Long-term Goals: 10 days (10/14/2021)  Goal 1: Pt will demonstrate clinically safe swallow of least restrictive diet and thin liquids without s/s aspiration. 10/9: ongoing, continue thin liquids / no straws. Total assist required this date. Short-term Goals  Timeframe for Short-term Goals: 8 days (10/12/2021)  Dysphagia Goals: * The patient will tolerate recommended diet without observed clinical signs of aspiration 10/9: Addressed, Progressing, continue  * The patient will recall and perform compensatory strategies, with no cues.  10/9: Addressed, dependent on caregiver to cue her when feeding, pt needs continued reinforcement  * The patient/caregiver will demonstrate understanding of compensatory strategies for improved swallowing safety. 10/9: caregiver not present this date, reviewed with pt only. Speech/Language/Cog Goals:  Timeframe for Long-term Goals: 10 days (10/14/2021)  Goal 1: Pt will improve cognitive-linguistic skills to increase safety and independence at next level of care. 10/9: ongoing, see below  Goal 2: Pt will use strategies to improve speech intelligibility to a level functional to participate in conversation with all listeners. 10/9: did not directly target this date     Short-term Goals  Timeframe for Short-term Goals: 8 days (10/12/2021)  Goal 1: Pt will complete verbal reasoning and problem solving tasks for improved safety awareness with 70%  acc given mod-max  cues`10/9: not addressed. Goal modified this date  Goal 2: Pt will complete visual scanning tasks at phrase-sentence level with 80% acc given min cues 10/9: not addressed, continue  Goal 3: Pt will achieve 100% speech intelligibility in conversational exchanges using strategies (slowed rate, exaggerated articulation, increased volume) with  mod cues 10/9: not addressed continue. Goal modified this date  Goal 4, New goal: The pt will complete automatic speech tasks with 70% accuracy, mod cues. 10/9: pt able to successfully relay  and city of [facility]'). She was unable to state place despite f2 choices, no attempt. Per RN, pt oriented to year this AM, not oriented to place. *Of note, pt did not recall fall earlier this AM.      Goal 5: New goal: The pt will follow basic, 1-step commands with 70% accuracy, mod cues. 10/9: indirectly targeted this date; pt required consistent cues for adequate MATT to participate in session. Poor positioning with pt continuously sliding down in bed despite cues from SLP & encouragement.       Recommendations:  Solid Consistency: Pureed (IDDSI 4)  Liquid Consistency: Thin liquid (IDDSI 0), no straw, assist to hold the cup so she can self-feed  Medication: with puree, crushed as allowable    Patient/Family/Caregiver Education:  SLP re: role of ST, rationale for f/u and recommendation for ongoing ST after d/c.  Pt verbalized understanding but did not demonstrate evidence of learning. Compensatory Strategies: Sit up for all meals and thereafter for 30 minutes, Eat with small bites (1/2 tsp; 1 tsp), Place food on the  right side of the mouth and Check mouth for food residue after snacks and meals with toothbrushing and swabs in addition to tongue sweep, no straw    Plan:    Continued Dysphagia treatment with goals per plan of care. Discharge Recommendations: Pt will benefit from continued ST at next level of care. Rec 24 hour supervision due to cognitive impairment, dysphagia  If pt discharges from hospital prior to Speech/Swallowing discharge, this note serves as tx and discharge summary.        Total Treatment Time / Charges     Time in Time out Total Time / units   Cognitive Tx         Speech Tx 0999 1102 10 min / 1 unit   Dysphagia Tx 0937 7888 8 min / 1 unit     Signature:  NIC Holman  Speech-language pathologist  [id number]  Speech Desk Phone: [phone]

## 2021-10-10 NOTE — PROGRESS NOTES
with normal bowel sounds. Musculoskeletal: No clubbing, cyanosis or edema bilaterally. Full range of motion without deformity. Skin: Skin color, texture, turgor normal.  No rashes or lesions. Neurologic:  She still has the L visual field defect. She can barely lift her L hand off the mattress. Says she has normal sensation. L facial droop. R gaze preference. Psychiatric: drowsy, mostly oriented but now she cannot tell me where she is or why she got admitted to the hospital.  No longer has much insight. Capillary Refill: Brisk,3 seconds, normal   Peripheral Pulses: +2 palpable, equal bilaterally       Labs:   Recent Labs     10/08/21  0529   WBC 13.9*   HGB 11.3*   HCT 32.2*        Recent Labs     10/08/21  0529      K 4.3      CO2 23   BUN 35*   CREATININE 0.8   CALCIUM 9.7     No results for input(s): AST, ALT, BILIDIR, BILITOT, ALKPHOS in the last 72 hours. No results for input(s): INR in the last 72 hours. No results for input(s): [place] in the last 72 hours. Urinalysis:      Lab Results   Component Value Date    NITRU Negative 10/09/2021    WBCUA 6-9 10/06/2021    BACTERIA Rare 10/06/2021    RBCUA 5-10 10/06/2021    BLOODU Negative 10/09/2021    SPECGRAV 1.025 10/09/2021    GLUCOSEU Negative 10/09/2021       Radiology:  CT HEAD WO CONTRAST   Final Result   Aging or evolving infarct on the right, with slight increase in midline shift   to the left. No obvious hemorrhage noted. Nodularity posterior nasopharynx, similar to prior. MRI BRAIN WO CONTRAST   Final Result   Unchanged subacute right MCA infarct. XR CHEST PORTABLE   Final Result   No acute cardiopulmonary abnormality. CTA HEAD NECK W CONTRAST   Final Result   1. Occlusion of the right middle cerebral artery at the origin may be acute. Overall decreased size and number of enhancing right middle cerebral artery   branches in comparison to the left.   This correlates with findings of large   acute/subacute right middle cerebral artery territory infarct. 2.  Short segment high-grade stenosis of a left middle cerebral artery M2   branch. Mild-to-moderate stenosis in the left middle cerebral artery M1   segment. Diffuse atherosclerotic irregularity of the more distal left middle   cerebral artery branches. 3.  The bilateral posterior cerebral arteries are diffusely small and not   well seen, underlying disease is not excluded. 4.  No hemodynamically significant stenosis in the bilateral cervical   internal carotid arteries per NASCET criteria. Bilateral carotid bulb   atherosclerotic plaque. 5.  Patent bilateral vertebral arteries. Critical results were called by Dr. Reyes Alcala Sessions to Dr. David Daniels on   10/2/2021 at 19:49. This scan was analyzed using [employer]. ai contact LVO. Identification of suspected   findings is not for diagnostic use beyond notification. Viz LVO is limited to   analysis of imaging data and should not be used in-lieu of full patient   evaluation or relied upon to make or confirm diagnosis. CT HEAD WO CONTRAST   Final Result   1. Large acute/subacute stroke in the right middle cerebral artery   territory. No hemorrhagic conversion. 2.  Sulcal effacement throughout the right cerebral hemisphere with partial   effacement of the right lateral and 3rd ventricles and minimal right left   midline shift measuring 2 mm. 3.  Chronic small vessel ischemic disease. Old left thalamic and basal   ganglia lacunar infarcts are similar to the prior study. Critical results were called by Dr. Reyes Alcala Sessions to Dr. David Daniels on   10/2/2021 at 19:29.                  Assessment/Plan:    Active Hospital Problems    Diagnosis     Peripheral visual field defect, left [H53.452]     Acute ischemic stroke ([facility] [address] [I63.9]     Hypertensive emergency [I16.1]        64 Y F with a h/o smoking, obesity, HTN, and HLD presented with one day of left-sided vision changes, dropping things with her left hand, and LUE paresthesias.          Large RMCA subacute ischemic stroke  - probably thromboembolic.  Her CTA showed an occlusion of the origin of the RMCA, also significant stenoses of the LMCA. - aspirin, statin  - PT/OT  - repeat head CT did show that as the infarct evolved there was some mass effect on the R lateral ventricle and the midline shift had gone from 2-3 mm to 5 mm. Neurology would like to repeat head CT around 10/16.     Severe HTN  - gradual blood pressure control in light of her stroke and syncopal spells.  Avoid hypotension.   - her amlodipine and carvedilol were resumed over the weekend.  BP stayed very high.    - added chlorthalidone, but then her Na decreased, BUN increased, and she had a syncopal spell, so this was stopped overnight. - started low-dose losartan.     Syncopal episodes on 10/5 and 10/6. Probably vasovagal since both episodes occurred on the toilet. No events on tele. LV function fine on TTE. No seizure activity during her syncopal spells. Delirium with behavioral disturbance. Supportive care. Avoid restraints and sedatives if possible.      HLD.  Statin.     Obesity.  Encouraged diet and exercise as able. DVT Prophylaxis: enoxaparin  Diet: Adult Oral Nutrition Supplement; Standard High Calorie/High Protein Oral Supplement  ADULT DIET; Dysphagia - Pureed; No Added Salt (3-4 gm); No Drinking Straws  Code Status: Full Code    PT/OT Eval Status: rec'd IPR    Dispo - medically ready for discharge at this point. The plan had been for the ARU but she no longer is appropriate for this. [facility] has been looking into SNF options and getting a pending medicaid number.       Yocasta Harry MD

## 2021-10-10 NOTE — PROGRESS NOTES
Pt alert but confused. She knows she is in the hospital. BP elevated but pt would not stay still during vitals. Shift assessment completed. Pt is still restless and would not follow instructions. Restraints are on. AVASYS in place for safety. Bed in lowest position, wheels locked, bed alarm on and audible. 100

## 2021-10-10 NOTE — CARE COORDINATION
[facility] will [facility] to discuss [facility]s with sister Chelsi pressley .

## 2021-10-11 PROBLEM — I63.9 ACUTE CVA (CEREBROVASCULAR ACCIDENT) (HCC): Status: ACTIVE | Noted: 2021-01-01

## 2021-10-11 NOTE — CARE COORDINATION
[facility] notes that pt is no longer appropriate for IPR according to MD prior note. Referrals placed to several SNF. [facility] accepted, pt has pending medicaid. Spoke to Estelle Goodell with [facility] and she has call out to sister for financials. [facility] can accept pt pending finances and call back from family. Writer called brother and sister and LVM today pt medically stable for dc and wanting to move forward with SNF placement. CM following-Nicole Jenkins RN       ADDENDUM 1200: Updated by DAVID Malhotra NP/Quan that pt is NOT medically stable for dc at this time. Met with pt and brother Lenaa Galo that pt will not dc to SNF today. Spoke to sister Faye Fong as well and she is hesitant with SNF placement at [facility] and wanting referral to [facility] now. CM following-Nicole Jenkins RN      ADDENDUM 1334: writer notes [id number] initiated by Dr. Lopes Late to [facility]. Squad packet provided and updated C5/US. No other DCP needs identified at this time.  Isamar Abreu RN

## 2021-10-11 NOTE — PROGRESS NOTES
Pepcid given Iv and flushed with NS. Patient assisted to right side but. She promptly went to her back and put her leg over the side rail. When asked what was she trying to do she said \"just trying to get comfy\" Continues to pull at telemetry box or or pull off diaper when restraints released for range of motion. Refuses to do most commands. She will take sips of fluid at times and reuses at time.

## 2021-10-11 NOTE — DISCHARGE SUMMARY
Hospital Medicine Discharge Summary TRANSFER TO [facility]    Patient ID: Ayaka Hong      Patient's PCP: Julia Leger, RONNELL - CNP    Admit Date: 10/2/2021     Discharge Date:   10/11/21     Admitting Physician: Ebony Mansfield DO     Discharge Physician: Gustavo Oliveira MD     Discharge Diagnoses: Active Hospital Problems    Diagnosis     Peripheral visual field defect, left [H53.452]     Acute ischemic stroke ([address] [I63.9]     Hypertensive emergency [I16.1]        The patient was seen and examined on day of discharge and this discharge summary is in conjunction with any daily progress note from day of discharge. Hospital Course:  64 Y F with a h/o smoking, obesity, HTN, and HLD presented with one day of left-sided vision changes, dropping things with her left hand, and LUE paresthesias.          Large RMCA subacute ischemic stroke  - probably thromboembolic.  Her CTA showed an occlusion of the origin of the RMCA, also significant stenoses of the LMCA. - aspirin, statin  - PT/OT rec'd IPR, then as she declined the rec'd SNF  - patient's mental state and neuro exam gradually deteriorated over the last few days, now much worse today. - repeat head CTs did show that as the infarct evolved there was some mass effect on the R lateral ventricle and the midline shift had gone from 2-3 mm to 5 mm to 7mm (actually Dino Reagan from [facility] neurosurgery thought it was more like 9 mm). - spoke with [facility] neurosurgery. Since she is more than one week out from this stroke they would be very unlikely to perform a hemicraniectomy or other surgical intervention. He recommended that she be admitted for consideration of 3% saline infusion and/or continuous EEG. He had Nilson Márquez of the neurology service call me and she agreed. Apparently the hospital medicine service will be the primary accepting team and the transfer center will have them call me shortly.   Addendum: Dr. Brandon Martinez accepted.     Leukocytosis is likely due to above. No localizing symptoms or exam findings. Afebrile, on RA. UA negative repeatedly. CXR clear. Severe HTN  - gradual blood pressure control in light of her stroke and syncopal spells.  Avoid hypotension.   - her amlodipine and carvedilol were resumed over the weekend.  BP stayed very high.    - added chlorthalidone, but then her Na decreased, BUN increased, and she had a syncopal spell, so this was stopped overnight.    - started low-dose losartan.     Syncopal episodes on 10/5 and 10/6.  Probably vasovagal since both episodes occurred on the toilet.  No events on tele. Vitals were stable. LV function fine on TTE. No seizure activity during these spells.     Delirium with behavioral disturbance. Supportive care. Avoid restraints and sedatives if possible.      HLD.  Statin.     Obesity.  Encouraged diet and exercise as able. Physical Exam Performed:     BP (!) 154/93   Pulse 86   Temp 98 °F (36.7 °C) (Oral)   Resp 16   Ht 5' 4\" (1.626 m)   Wt 195 lb (88.5 kg)   SpO2 96%   BMI 33.47 kg/m²       General appearance: No apparent distress, appears stated age. HEENT: Pupils equal, round, and reactive to light. Conjunctivae/corneas clear. Neck: Supple, with full range of motion. No jugular venous distention. Trachea midline. Respiratory:  Normal respiratory effort. Clear to auscultation, bilaterally without Rales/Wheezes/Rhonchi. Cardiovascular: Regular rate and rhythm with normal S1/S2 without murmurs, rubs or gallops. Abdomen: Soft, non-tender, non-distended with normal bowel sounds. Musculoskeletal: No clubbing, cyanosis or edema bilaterally. Full range of motion without deformity. Skin: Skin color, texture, turgor normal.  No rashes or lesions. Neurologic:  When she was more alert were were able to ascertain that she had a L visual field deficits and L neglect.   She used to be able to move her LUE but she was uncoordinated in the L hand.  Then about a week ago she became weak in the L hand and developed a L facial droop. Then as of a couple days ago the LUE was weak throughout. Now today she isn't moving the LLE either. Psychiatric: the patient was initially wide awake on admission, then became slightly disoriented but still alert as of about 5 days ago, then became drowsy about 3 days ago, then became drowsy and very disoriented as of yesterday, and today is unresponsive. Capillary Refill: Brisk,3 seconds, normal   Peripheral Pulses: +2 palpable, equal bilaterally       Labs: For convenience and continuity at follow-up the following most recent labs are provided:      CBC:    Lab Results   Component Value Date    WBC 18.2 10/11/2021    HGB 9.7 10/11/2021    HCT 28.0 10/11/2021     10/11/2021       Renal:    Lab Results   Component Value Date     10/11/2021    K 3.7 10/11/2021    CL 97 10/11/2021    CO2 23 10/11/2021    BUN 45 10/11/2021    CREATININE 1.1 10/11/2021    CALCIUM 9.6 10/11/2021    PHOS 3.6 03/03/2021         Significant Diagnostic Studies    Radiology:   XR CHEST PORTABLE   Final Result   No acute cardiac or pulmonary disease. CT HEAD WO CONTRAST   Final Result   Large infarct on the right, with increased midline shift to the left. No   obvious hemorrhage noted      Nodularity the posterior nasopharynx, similar to prior. CT HEAD WO CONTRAST   Final Result   Aging or evolving infarct on the right, with slight increase in midline shift   to the left. No obvious hemorrhage noted. Nodularity posterior nasopharynx, similar to prior. MRI BRAIN WO CONTRAST   Final Result   Unchanged subacute right MCA infarct. XR CHEST PORTABLE   Final Result   No acute cardiopulmonary abnormality. CTA HEAD NECK W CONTRAST   Final Result   1. Occlusion of the right middle cerebral artery at the origin may be acute.    Overall decreased size and number of enhancing right middle cerebral artery   branches in comparison to the left. This correlates with findings of large   acute/subacute right middle cerebral artery territory infarct. 2.  Short segment high-grade stenosis of a left middle cerebral artery M2   branch. Mild-to-moderate stenosis in the left middle cerebral artery M1   segment. Diffuse atherosclerotic irregularity of the more distal left middle   cerebral artery branches. 3.  The bilateral posterior cerebral arteries are diffusely small and not   well seen, underlying disease is not excluded. 4.  No hemodynamically significant stenosis in the bilateral cervical   internal carotid arteries per NASCET criteria. Bilateral carotid bulb   atherosclerotic plaque. 5.  Patent bilateral vertebral arteries. Critical results were called by Dr. Gabriela Fiore to Dr. Arabella Amin on   10/2/2021 at 19:49. This scan was analyzed using [employer]. ai contact LVO. Identification of suspected   findings is not for diagnostic use beyond notification. Viz LVO is limited to   analysis of imaging data and should not be used in-lieu of full patient   evaluation or relied upon to make or confirm diagnosis. CT HEAD WO CONTRAST   Final Result   1. Large acute/subacute stroke in the right middle cerebral artery   territory. No hemorrhagic conversion. 2.  Sulcal effacement throughout the right cerebral hemisphere with partial   effacement of the right lateral and 3rd ventricles and minimal right left   midline shift measuring 2 mm. 3.  Chronic small vessel ischemic disease. Old left thalamic and basal   ganglia lacunar infarcts are similar to the prior study. Critical results were called by Dr. Gabriela Fiore to Dr. Arabella Amin on   10/2/2021 at 19:29.                 Consults:     IP CONSULT TO STROKE TEAM  IP CONSULT TO HOSPITALIST  IP CONSULT TO NEUROLOGY  IP CONSULT TO FINANCIAL COUNSELOR  IP CONSULT TO PHYSICAL MEDICINE REHAB    Disposition:  [facility] ICU Condition at Discharge: Unstable    Discharge Instructions/Follow-up:  TBD    Code Status:  Full Code     Activity: activity as tolerated    Diet: npo      Discharge Medications:     Current Discharge Medication List           Details   losartan (COZAAR) 25 MG tablet Take 1 tablet by mouth daily  Qty: 30 tablet, Refills: 3              Details   atorvastatin (LIPITOR) 80 MG tablet Take 1 tablet by mouth nightly  Qty: 30 tablet, Refills: 3              Details   amLODIPine (NORVASC) 10 MG tablet Take 1 tablet by mouth daily  Qty: 90 tablet, Refills: 1    Associated Diagnoses: HTN (hypertension), benign      aspirin 81 MG chewable tablet Take 1 tablet by mouth daily  Qty: 90 tablet, Refills: 1    Associated Diagnoses: HTN (hypertension), benign      carvedilol (COREG) 12.5 MG tablet Take 1 tablet by mouth 2 times daily  Qty: 180 tablet, Refills: 1    Associated Diagnoses: HTN (hypertension), benign      omeprazole (PRILOSEC) 20 MG delayed release capsule Take 1 capsule by mouth daily  Qty: 90 capsule, Refills: 1    Associated Diagnoses: Gastroesophageal reflux disease without esophagitis               Time Spent on discharge is more than 30 minutes in the examination, evaluation, counseling and review of medications and discharge plan. Signed:    Naty Breen MD   10/11/2021      Thank you RONNELL Franco - REAGAN for the opportunity to be involved in this patient's care. If you have any questions or concerns please feel free to contact me at [phone].

## 2021-10-11 NOTE — H&P
ICU HISTORY AND [facility] Day: 1 (this admission)  ICU Day: 1                                                         Code:Full Code  Admit Date: 10/11/2021  PCP: RONNELL Yancey CNP                                  CC: LUE weakness, AMS    HISTORY OF PRESENT ILLNESS:   Star Najera is a 64year old Female with a PmHx of HTN, HLD, GERD, obesity, tobacco use who presented with one day of left-sided vision changes, fine motor deficits (dropping objects) in the L hand, and LUE paresthesia. Transferred for worsening lethargy and disorientation. She was previously admitted for eye vision changes and headache. Hx of reporting stroke like symptoms in March for which she got an MRI. CT on 10/2 admission showed a large R MCA occlusion and Sulcal effacement throughout the right cerebral hemisphere with partial effacement of the right lateral and 3rd ventricles and minimal right left midline shift measuring 2 mm. Evidence of chronic infarcts. She was not a tPA or thrombectomy candidate. Head CT on 10/11 showed a large infarct on the right, with increased midline shift to the left. Elevated WBC count, with negative chest XR and urinalysis    Concern at [facility] for non-convulsive SE given patient's reported deteriorating mental status. Transferred to [facility] for consideration of 3% saline infusion and/or continuous EEG. PAST HISTORY:     Past Medical History:   Diagnosis Date    Hypertension        Past Surgical History:   Procedure Laterality Date    CHOLECYSTECTOMY      HYSTERECTOMY         SocialHistory:     Alcohol: reports no use  Illicit drugs: no use  Tobacco:  reports that she has been smoking cigarettes. She started smoking about 41 years ago. She has a 20.00 pack-year smoking history. She has never used smokeless tobacco.    Family History:  No family history on file. MEDICATIONS:     No current facility-administered medications on file prior to encounter.      Current Outpatient Medications on File Prior to Encounter   Medication Sig Dispense Refill    losartan (COZAAR) 25 MG tablet Take 1 tablet by mouth daily 30 tablet 3    atorvastatin (LIPITOR) 80 MG tablet Take 1 tablet by mouth nightly 30 tablet 3    amLODIPine (NORVASC) 10 MG tablet Take 1 tablet by mouth daily 90 tablet 1    aspirin 81 MG chewable tablet Take 1 tablet by mouth daily 90 tablet 1    carvedilol (COREG) 12.5 MG tablet Take 1 tablet by mouth 2 times daily 180 tablet 1    omeprazole (PRILOSEC) 20 MG delayed release capsule Take 1 capsule by mouth daily 90 capsule 1       Allergies: Allergies   Allergen Reactions    Bee Pollen Swelling    Lisinopril Nausea Only    Varenicline Nausea And Vomiting       REVIEW OF SYSTEMS:       History obtained from chart review    Review of Systems   Unable to perform ROS: Mental status change       PHYSICAL EXAM:       Vitals: There were no vitals taken for this visit. I/O:  No intake or output data in the 24 hours ending 10/11/21 2022  No intake/output data recorded. No intake/output data recorded. Physical Examination:     Physical Exam  Constitutional:       Appearance: She is ill-appearing. Comments: Patient is lethargic and responsive only to pain. HENT:      Head: Normocephalic. Right Ear: External ear normal.      Left Ear: External ear normal.      Nose: Nose normal.   Eyes:      General:         Right eye: No discharge. Left eye: No discharge. Extraocular Movements: Extraocular movements intact. Conjunctiva/sclera: Conjunctivae normal.      Pupils: Pupils are equal, round, and reactive to light. Cardiovascular:      Rate and Rhythm: Normal rate and regular rhythm. Pulses: Normal pulses. Heart sounds: Normal heart sounds. No murmur heard. No gallop. Pulmonary:      Effort: Pulmonary effort is normal. No respiratory distress. Breath sounds: Normal breath sounds. No wheezing.       Comments: Increased upper fraction of 55-60%. No regional wall motion abnormalites are seen. ASSESSMENT AND PLAN:   Garrett Singleton is a 64year old Female with a PmHx of HTN, HLD, GERD, obesity, tobacco use who presented with one day of left-sided vision changes, fine motor deficits (dropping objects) in the L hand, and LUE paresthesia. Transferred for worsening lethargy and disorientation. Suspected non-convulsive SE  Transferred to [facility] for potential cEEG and/or saline 3%. No observed clonus. Patient has become increasingly lethargic, could be post-ictal.  - Neurology consulted, recs appreciated  - MRI ordered upon admission  - q1h neuro checks    Large RMCA subacute ischemic stroke  Likely thromboembolic. Her CTA showed an occlusion of the origin of the RMCA, also significant stenoses of the LMCA. ECHO was normal. Uncontrolled HTN could contribute. Patient is more than a week out from stroke onset so pt is unlikely to be a neurosurgery candidate.  - aspirin, statin  - PT/OT  - MRI head ordered     HTN, poorly controlled  Outside hospital resumed home amlodipine and carvedilol but patient has had fluctuating highs and lows of BP. Orthostatic hypotension has resulted in 2 syncopal episodes while inpatient. She was given chlorthalidone, but then her Na decreased, BUN increased, and she had a syncopal spell, so this was stopped overnight.   - continue home amlodipine and carvedilol with hold parameters  - can start low-dose losartan if SBP are uncontrolled on home meds, careful to avoid hypotension    Syncopal episodes on 10/5 and 10/6  Likely vasovagal since both episodes occurred on the toilet. Could also be 2/2 orthostatic hypotension. No tele events documented. No seizure activity documented during her syncopal spells. ECHO normal.  - continuous tele    Leukocytosis  Likely 2/2 stroke. Concern for infection, CXR clear and UA repeatedly negative.   - Continue to follow  - Ordered blood cultures    Delirium  AMS likely 2/2 stroke and delirium vs. Post-ictal in SE  - Supportive care  - Avoid restraints and sedatives if possible    HLD  - Statin     Obesity  Encouraged diet and exercise as able.     Prediabetes   A1c 5.7  - LDSSI  - Hypoglycemia protocol, POCT checks    Code Status:Full Code  FEN: NPO  PPX:  Lovenox, Pepcid  DISPO: ICU    This patient has been staffed and discussed with Dr. Carlos Camarena  -----------------------------  Tashi Staples MD, PGY-1  10/11/2021  8:22 PM

## 2021-10-11 NOTE — PROGRESS NOTES
Speech Language Pathology    SLP attempted f/u, spoke with RN. Pt sleeping soundly upon SLP entry, pt keeping eyes closed despite SLP's voice or sternal rub, did not attend to SLP. ST to continue to follow and re-attempt f/u at a later time.     Timmy Shearer M.S. [address] [facility]  Speech-language pathologist  [id number]  Speech Desk Phone: [phone]

## 2021-10-11 NOTE — PROGRESS NOTES
Kevin Pichardo DO        polyethylene glycol (GLYCOLAX) packet 17 g  17 g Oral Daily PRN Ponce Ba, DO        aspirin EC tablet 81 mg  81 mg Oral Daily Kevin Charlesglenroy DO   81 mg at 10/10/21 1314    Or    aspirin suppository 300 mg  300 mg Rectal Daily Angelicaira LAZARO DO Marino        perflutren lipid microspheres (DEFINITY) injection 1.65 mg  1.5 mL IntraVENous ONCE PRN Shamika Newton LAZARO Pichardo DO        atorvastatin (LIPITOR) tablet 80 mg  80 mg Oral Nightly Kevin WILLIS Marino DO   80 mg at 10/09/21 2028    COVID-19 Ad26 Vaccine ([employer], [employer]) injection 0.5 mL  1 Dose IntraMUSCular Prior to discharge Shamika Charleszi          Allergies   Allergen Reactions    Bee Pollen Swelling    Lisinopril Nausea Only    Varenicline Nausea And Vomiting      reports that she has been smoking cigarettes. She started smoking about 41 years ago. She has a 20.00 pack-year smoking history. She has never used smokeless tobacco. She reports that she does not drink alcohol and does not use drugs. Objective:  Exam:   Constitutional:   Vitals:    10/10/21 2318 10/11/21 0230 10/11/21 0557 10/11/21 0910   BP: (!) 143/87 (!) 183/84  121/81   Pulse: 87 92  72   Resp: 18 18  16   Temp: 97.8 °F (36.6 °C) 97.8 °F (36.6 °C)  98.2 °F (36.8 °C)   TempSrc: Oral Oral  Oral   SpO2: 95% 98%  96%   Weight:   195 lb (88.5 kg)    Height:         General appearance:  Lethargic. Won't open eyes. Mental Status: Lethargic. Cranial Nerves:   II: Visual fields: Left-sided neglect. Pupils: equal, round, reactive to light  III,IV,VI: Extra Ocular Movements are intact. No nystagmus  V: Facial sensation is intact  VII: Facial strength and movements: left facial droop noted. XII: Tongue movements are normal  Musculoskeletal: unable - patient is lethargic  Tone: Normal tone. Coordination: unable   Sensation: left sided neglect  Gait/Posture: did not test gait.       Data:  LABS:   Lab Results   Component Value Date     10/11/2021    K 3.7 10/11/2021 CL 97 10/11/2021    CO2 23 10/11/2021    BUN 45 10/11/2021    CREATININE 1.1 10/11/2021    GFRAA >60 10/11/2021    LABGLOM 50 10/11/2021    GLUCOSE 134 10/11/2021    PHOS 3.6 03/03/2021    MG 1.60 10/05/2021    CALCIUM 9.6 10/11/2021     Lab Results   Component Value Date    WBC 18.2 10/11/2021    RBC 3.28 10/11/2021    HGB 9.7 10/11/2021    HCT 28.0 10/11/2021    MCV 85.4 10/11/2021    RDW 13.3 10/11/2021     10/11/2021     Lab Results   Component Value Date    INR 1.07 10/02/2021    PROTIME 12.1 10/02/2021       Reviewed blood test and notes from different physician    Impression: No change compared to yesterday. Acute confusion with left-sided neglect and visual field deficit consistent with new, large, ischemic right MCA stroke, likely thromboembolic from MCA stenosis and occlusion. Repeat head CT 10/6 following syncopal episode revealed evolving right MCA stroke with slightly increased midline shift. Repeat head CT 10/11 due to lethargy reveals worsening midline shift. ECHO reviewed, no PFO or thrombus. Hypertension, not controlled  Hyperlipidemia  Prediabetes, A1c 5.7. Orthostatic hypotension    Recommendation      Avoid hypotension  Blood pressure control  DVT and GI prophylaxis  Insulin sliding scale    Recommend transfer to a neurological intensive care unit given worsening mental status and increasing midline shift on CTH. Discussed with Dr. Kassy Castano and hospitalist.      Balbir Cook CNP    This dictation was generated by voice recognition computer software. Although all attempts are made to edit the dictation for accuracy, there may be errors in the transcription that are not intended.

## 2021-10-11 NOTE — PROGRESS NOTES
Pt. Arrived to room 4514 at 1830. Pt. Clearly restless in the bed. Alert to self. Able to answer her name and birthday, but when asked the year, she restates her birthday. Neuro assessment completed. Pt. Minimally cooperative. Her left arm is very clearly flaccid. No resistance to gravity and she keeps it across her chest. Her left leg has some resistance to gravity. She is unable to lift it herself and when it is lifted for her she states it hurts. When asked to squeeze my hand with her L. Hand, she reaches across her body with her right arm to squeeze my hand. VSS. Normotensive when staying still.  Residents and Neurology aware of patient's arrival.

## 2021-10-11 NOTE — PROGRESS NOTES
Occupational Therapy  Facility/Department: [facility] C5 - MED SURG/ORTHO  Daily Treatment Note  NAME: Amanda Portillo  : 1960  MRN: [id number]    Date of Service: 10/11/2021    Discharge Recommendations:  Subacute/Skilled Nursing Facility       Assessment   Performance deficits / Impairments: Decreased functional mobility ; Decreased safe awareness;Decreased balance;Decreased vision/visual deficit; Decreased ADL status; Decreased ROM; Decreased strength;Decreased coordination;Decreased high-level IADLs;Decreased cognition;Decreased fine motor control;Decreased endurance;Decreased sensation;Decreased posture  Assessment: Pt with fair tolerance of OT session, difficulty attending to directions, task, and very fatigued. Pt falling asleep, continues to demo L neglect, leaning heavily to L and pushing with RUE. Pt with decreased insight into deficits, disoriented to situation and following ~25% of commands. Pt functioning below her baseline and would benefit from continued skilled OT in SNF setting at d/c. Prognosis: Fair  OT Education: OT Role;Plan of Care;Precautions;Transfer Training;ADL Adaptive Strategies;Orientation; Family Education  Disease Specific Education: Pt educated on importance of OOB mobility, prevention of complications of bedrest, and general safety during hospitalization. Pt verbalized understanding.   Barriers to Learning: cognition, fatigue  REQUIRES OT FOLLOW UP: Yes  Activity Tolerance  Activity Tolerance: Patient limited by fatigue;Treatment limited secondary to decreased cognition  Activity Tolerance: Vitals: BP= 121/81, HR= 72, SPO2= 96%  Safety Devices  Safety Devices in place: Yes  Type of devices: Left in bed;Bed alarm in place;Call light within reach;Nurse notified;Gait belt  Restraints  Initially in place: Yes  Restraints: BUE soft wrist restraints reapplied at EOS         Patient Diagnosis(es): The primary encounter diagnosis was Cerebrovascular accident (CVA) due to occlusion of right middle cerebral artery ([facility] [address] Diagnoses of Peripheral visual field defect, left, Hypokalemia, and Hypertensive emergency were also pertinent to this visit. has a past medical history of Hypertension. has a past surgical history that includes Hysterectomy and Cholecystectomy. Restrictions  Restrictions/Precautions  Restrictions/Precautions: General Precautions, Fall Risk, Up as Tolerated, Seizure  Position Activity Restriction  Other position/activity restrictions: [employer], tele     Subjective   General  Chart Reviewed: Yes, Imaging, History and Physical  Patient assessed for rehabilitation services?: Yes  Response to previous treatment: Patient with no complaints from previous session  Family / Caregiver Present: No  Referring Practitioner: Dr. Fam Bullock  Diagnosis: CVA with Left side hemiparesis, Left visual neglect    Subjective  Subjective: Pt resting in bed, not resistive to OT treatment. Vital Signs  Patient Currently in Pain:  (LISA, no c/o made however pt minimally verbal)     Orientation  Orientation  Overall Orientation Status: Impaired  Orientation Level: Oriented to person;Oriented to place; Disoriented to situation;Disoriented to time     Objective    ADL  LE Dressing: Dependent/Total  Toileting: Dependent/Total     Balance  Sitting Balance: Dependent/Total (max A of 1 for trunk, min-mod A of 2nd person to hold RUE to prevent pushers and block BLEs)  Standing Balance: Unable to assess (unsafe to trial)    Bed mobility  Rolling to L: Max A  Rolling to R: Max A  Supine to Sit: Dependent/Total;2 Person assistance  Sit to Supine: Dependent/Total;2 Person assistance     Cognition  Overall Cognitive Status: Exceptions  Arousal/Alertness: Delayed responses to stimuli;Inconsistent responses to stimuli  Following Commands: Does not follow commands; Inconsistently follows commands  Attention Span: Attends with cues to redirect; Unable to maintain attention  Safety Judgement: Decreased awareness of need for

## 2021-10-11 NOTE — PROGRESS NOTES
Pt assessment completed and charted. VSS. Pt denies any pain at this time. RN to wean O2 as tolerated. Bed in lowest position and wheels locked. Call light within reach. Bedside table within reach. Non-skid footwear in place. Pt denies any other needs at this time. Pt calls out appropriately. Will continue to monitor.

## 2021-10-11 NOTE — PROGRESS NOTES
Clinical RN, Patti Newsome, notified of patient's status and recommendation from Neuro to tfr this patient to higher level of neuro specialized care. This RN to follow up with primary RN regarding plan of care and to reach out to hospitalist for possible transfer.

## 2021-10-11 NOTE — PROGRESS NOTES
Call received from radiology with results of CT - Urgent Perfect Serve sent to Briana Lim CNP with neurology - below message sent:     546 CT Resulted - worsening midline shift - CT reads -\" Large infarct on the right, with increased midline shift to the left. No obvious hemorrhage noted Nodularity the posterior nasopharynx, similar to prior. \"    Primary RN, ALDA Casey, notified.

## 2021-10-11 NOTE — PROGRESS NOTES
Physical Therapy  Facility/Department: [facility] C5 - MED SURG/ORTHO  Daily Treatment Note  NAME: Yancy Robert  : 1960  MRN: [id number]    Date of Service: 10/11/2021    Discharge Recommendations:  Continue to assess pending progress (SNF vs IPR)   PT Equipment Recommendations  Equipment Needed: No  Other: defer to patient's facility    Assessment   Body structures, Functions, Activity limitations: Decreased functional mobility ; Decreased strength;Decreased endurance;Decreased vision/visual deficit; Decreased coordination;Decreased posture;Decreased ADL status; Decreased safe awareness;Decreased high-level IADLs;Decreased cognition;Decreased balance;Decreased fine motor control  Assessment: Pt limited by poor cognition, impulsiveness and weakness. Required max Ax2 for bed mobility adn inappropriate for transfers. Tx focuses on sitting balance, attention to L side. REturned to supine at EOS. Treatment assist of 2 required for improved perfaormance towards PT goals. Treatment Diagnosis: Decreased independence with functional mobility  Prognosis: Good  Decision Making: Medium Complexity  Patient Education: Pt educated in role of therapy, L neglect, safety awareness. Pt does not show evidence of learning today due to lethargy and decreased cognition  Barriers to Learning: left visual neglect, visual field cut, delayed processing, lethargy  REQUIRES PT FOLLOW UP: Yes  Activity Tolerance  Activity Tolerance: Patient limited by cognitive status  Activity Tolerance: 183/84  HR 64  O2 95%     Patient Diagnosis(es): The primary encounter diagnosis was Cerebrovascular accident (CVA) due to occlusion of right middle cerebral artery ([facility] [address] Diagnoses of Peripheral visual field defect, left, Hypokalemia, and Hypertensive emergency were also pertinent to this visit. has a past medical history of Hypertension.    has a past surgical history that includes Hysterectomy and Cholecystectomy. Restrictions  Restrictions/Precautions  Restrictions/Precautions: General Precautions, Fall Risk, Up as Tolerated, Seizure  Position Activity Restriction  Other position/activity restrictions: enrique Espinosa  Subjective   General  Chart Reviewed: Yes  Additional Pertinent Hx: HPI per chart, \"56 y.o. female who presented to [facility] with past medical history of hypertension, class I obesity, hyperlipidemia, GERD presented to the ED with chief complaint of eye vision change and headache. Patient also reports that she has been dropping things on her left side that is new in addition to having left fifth finger numbness. CT Head: 1. Large acute/subacute stroke in the right middle cerebral arteryterritory. No hemorrhagic conversion. Stroke team was called and reported that the patient is not a candidate for mechanical thrombectomy or TPA. \"  Response To Previous Treatment: Patient with no complaints from previous session. Family / Caregiver Present: No  Referring Practitioner: Paresh Anne MD  Subjective  Subjective: Patient agreed to participate. Pt lethargic  General Comment  Comments: Supine in bed upon entry. Cleared for therapy. Pain Screening  Patient Currently in Pain:  (LISA, pt not answering pain quires)  Vital Signs  Patient Currently in Pain:  (LISA, pt not answering pain quires)       Orientation  Orientation  Overall Orientation Status: Impaired  Orientation Level: Oriented to person;Disoriented to place; Disoriented to time;Disoriented to situation  Cognition      Objective   Bed mobility  Supine to Sit: Dependent/Total;2 Person assistance (max A 2)  Sit to Supine: Dependent/Total;2 Person assistance (max A 2)  Transfers  Sit to Stand: Unable to assess (no. d/t poor balance, impulsive)  Ambulation  Ambulation?: No     Balance  Posture: Poor  Sitting - Static: Poor  Sitting - Dynamic: Poor  Exercises  Heelslides: PROM 10  Ankle Pumps: PROM 10          AM-PAC Score     AM-PAC Inpatient Mobility without Stair Climbing Raw Score : 7 (10/11/21 0917)  AM-PAC Inpatient without Stair Climbing T-Scale Score : 28.66 (10/11/21 0917)  Mobility Inpatient CMS 0-100% Score: 86.29 (10/11/21 9135)  Mobility Inpatient without Stair CMS G-Code Modifier : CM (10/11/21 3146)       Goals  Short term goals  Time Frame for Short term goals: 1 week 10/13/21unless noted  Short term goal 1: Supine <> sit with supervision- 10/7 mod assist  -10/11 max 2  Short term goal 2: Sit <> stand with CGA by 10/12- 1/7 mod assist  to RW   -10/11 NT  Short term goal 3: Bed <> chair with CGA- 10/7 not attempted due to pt lethargy and decreased safety   -10/11 NT  Short term goal 4: Ambulate 50 feet with LRAD and min assist.- 10/6: 10' with Mod A x 2   -10/11 NT  Short term goal 5: By 10/6/21 Patient will tolerate 12-15 reps of her exercises to maximize her strength/endurance- MET, on going   -10/11 on-going  Patient Goals   Patient goals : To become stronger. Plan    Plan  Times per week: 3-5/week  Times per day: Daily  Plan weeks: 10/13/21  Specific instructions for Next Treatment: progress mobility as tolerated  Current Treatment Recommendations: Strengthening, Functional Mobility Training, Equipment Evaluation, Education, & procurement, ROM, Transfer Training, Gait Training, Safety Education & Training, Balance Training, ADL/Self-care Training, Endurance Training, Positioning, Patient/Caregiver Education & Training, Stair training  Safety Devices  Type of devices:  All fall risk precautions in place, Call light within reach, Gait belt, Patient at risk for falls, Left in bed, Nurse notified, Bed alarm in place  Restraints  Initially in place: No     Therapy Time   Individual Concurrent Group Co-treatment   Time In 0753         Time Out 0816         Minutes 23         Timed Code Treatment Minutes: [address]

## 2021-10-12 NOTE — PROGRESS NOTES
Neurological assessment is consistent/unchanged. Patient states first name, speech is otherwise unintelligible. Left arm is flaccid, patient patient moves other extremities spontaneously, left leg weaker than right. Patient occasionally follows simple commands on R side. 3 % NS infusing and titrated per orders. Will continue to monitor.

## 2021-10-12 NOTE — PROGRESS NOTES
Hospitalist Progress Note      PCP: RONNELL Millan - CNP    Date of Admission: 10/11/2021    Chief Complaint: Altered mental status    Hospital Course: 64year old Female with a PmHx of HTN, HLD, GERD, obesity, tobacco use who presented with one day of left-sided vision changes, fine motor deficits (dropping objects) in the L hand, and LUE paresthesia. Transferred for worsening lethargy and disorientation. Subjective: Patient remains encephalopathic and altered. She is unable to follow any commands or respond to any questions at this point      Medications:  Reviewed    Infusion Medications    sodium chloride 55 mL/hr (10/12/21 1046)    sodium chloride      dextrose       Scheduled Medications    sodium chloride flush  5-40 mL IntraVENous 2 times per day    [Held by provider] enoxaparin  40 mg SubCUTAneous Daily    famotidine (PEPCID) injection  20 mg IntraVENous Daily    amLODIPine  10 mg Oral Daily    carvedilol  12.5 mg Oral BID WC    atorvastatin  80 mg Oral Nightly    [Held by provider] aspirin  81 mg Oral Daily    insulin lispro  0-6 Units SubCUTAneous TID WC    insulin lispro  0-3 Units SubCUTAneous Nightly     PRN Meds: sodium chloride flush, sodium chloride, ondansetron **OR** ondansetron, polyethylene glycol, acetaminophen **OR** acetaminophen, glucose, dextrose, glucagon (rDNA), dextrose    No intake or output data in the 24 hours ending 10/12/21 1143    Physical Exam Performed:    BP (!) 151/108   Pulse 94   Temp 99.4 °F (37.4 °C) (Axillary)   Resp 22   Ht 5' 4.02\" (1.626 m)   Wt 195 lb 1.7 oz (88.5 kg)   SpO2 100%   BMI 33.47 kg/m²     General appearance: Encephalopathic, unable to follow commands. Lethargic  HEENT: Pupils equal, round, and reactive to light. Conjunctivae/corneas clear. Neck: Supple, with full range of motion. No jugular venous distention. Trachea midline. Respiratory:  Normal respiratory effort.  Clear to auscultation, bilaterally without Rales/Wheezes/Rhonchi. Cardiovascular: Regular rate and rhythm with normal S1/S2 without murmurs, rubs or gallops. Abdomen: Soft, non-tender, non-distended with normal bowel sounds. Musculoskeletal: No clubbing, cyanosis or edema bilaterally. Full range of motion without deformity. Skin: Skin color, texture, turgor normal.  No rashes or lesions. Neurologic: Right upper extremity power intact, no movement noted on the left upper extremity, withdraws to pain in both right lower and left lower extremity. Unable to fully assess as patient cannot follow commands  Psychiatric: Encephalopathic, agitated  Capillary Refill: Brisk,3 seconds, normal   Peripheral Pulses: +2 palpable, equal bilaterally       Labs:   Recent Labs     10/11/21  0616 10/12/21  0611   WBC 18.2* 19.4*   HGB 9.7* 9.1*   HCT 28.0* 26.9*    366     Recent Labs     10/11/21  0616 10/11/21  0616 10/12/21  0611 10/12/21  0800 10/12/21  1000   *   < > 137 139 139   K 3.7  --  3.9  --   --    CL 97*  --  100  --   --    CO2 23  --  22  --   --    BUN 45*  --  52*  --   --    CREATININE 1.1  --  1.2  --   --    CALCIUM 9.6  --  9.4  --   --     < > = values in this interval not displayed. No results for input(s): AST, ALT, BILIDIR, BILITOT, ALKPHOS in the last 72 hours. No results for input(s): INR in the last 72 hours. Recent Labs     10/12/21  0612   CKTOTAL 744*       Urinalysis:      Lab Results   Component Value Date    NITRU Negative 10/09/2021    WBCUA 6-9 10/06/2021    BACTERIA Rare 10/06/2021    RBCUA 5-10 10/06/2021    BLOODU Negative 10/09/2021    SPECGRAV 1.025 10/09/2021    GLUCOSEU Negative 10/09/2021       Radiology:  MRI BRAIN W WO CONTRAST   Final Result      1. Large, acute right MCA distribution infarct. This results in marked mass effect and right to left midline shift as well as subfalcine herniation intraventricular compression.  Small amount of acute intracranial hemorrhage posteriorly involving the

## 2021-10-12 NOTE — CARE COORDINATION
Case Management Assessment           Initial Evaluation                Date / Time of Evaluation: 10/12/2021 11:29 AM                 Assessment Completed by: Everardo Cartwright RN     Patient has transferred from [facility] but originally is from home and lives with her older brother Emmie Gan. She was independent with all ADL's prior to her hospitalization. I spoke with her sister Chelsi Brand and family is still interested in [facility] for rehab at d/c. I sent the referral to them and left a message with admissions as well. The patient has had a significant change since yesterday per Chelsi Brand. Yesterday she knew her family members, birth date and was talking with her son. Patient Name: Foreign Harrell     YOB: 1960  Diagnosis: Acute CVA (cerebrovascular accident) [facility]) [I63.9]     Date / Time: 10/11/2021  6:35 PM    Patient Admission Status: Inpatient    If patient is discharged prior to next notation, then this note serves as note for discharge by case management.      [facility] Patient: No    Chart Reviewed: Yes  Patient/ Family Interviewed: Yes    Initial assessment completed at bedside with: spoke with her sister Chelsi Brand by phone    Hospitalization in the last 30 days: Yes    Emergency Contacts:  Extended Emergency Contact Information  Primary Emergency Contact: [address] [place] [address] Phone: [phone]  Relation: Brother/Sister  Secondary Emergency Contact: [address] [place] [address] Phone: [phone]  Relation: Child    Advance Directives:   Code Status: Full Code    Healthcare Power of : No  Agent: NA  Contact Number: NA      Financial  Payor: PENDING Alleantia Ovens / Plan: PENDING MEDICAID / Product Type: *No Product type* /     Pre-cert required for SNF: No    Pharmacy    [address] [address]  Phone: [phone] Fax: [phone]      Potential assistance Purchasing Medications:    Does Patient want to participate in local refill/ meds to beds program?:      Meds To Beds General Rules:  1. Can ONLY be done Monday- Friday between 8:30am-5pm  2. Prescription(s) must be in pharmacy by 3pm to be filled same day  3. Copy of patient's insurance/ prescription drug card and patient face sheet must be sent along with the prescription(s)  4. Cost of Rx cannot be added to hospital bill. If financial assistance is needed, please contact unit  or ;  or  CANNOT provide pharmacy voucher for patients co-pays  5. Patients can then  the prescription on their way out of the hospital at discharge, or pharmacy can deliver to the bedside if staff is available. (payment due at time of pick-up or delivery - cash, check, or card accepted)     Able to afford home medications/ co-pay costs: No    ADLS  Support Systems:      PT AM-PAC:   /24  OT AM-PAC:   /24    [place]: two story home  Steps: 4 steps to enter    Plans to RETURN to current housing: No  Barriers to RETURNING to current housing: planned on going to rehab at d/c.     DISCHARGE PLAN:  Disposition: [place] (SNF): TBD Phone: NA Fax: NA- referral to [employer] for discharge: TBD     Factors facilitating achievement of predicted outcomes: Family support and [place]    Barriers to discharge: MRI brain, therapy evals, EEG    Additional Case Management Notes: [facility] but needed to review finances.      The Plan for Transition of Care is related to the following treatment goals of Acute CVA (cerebrovascular accident) [facility]) [I63.9]    The Patient and/or patient representative Anuradha Flores and her family were provided with a choice of provider and agrees with the discharge plan Yes    Freedom of choice list was provided with basic dialogue that

## 2021-10-12 NOTE — CONSULTS
Symptom Management  []  Other (Specify)    Requesting Physician: Dr. Luis Epps:  Vision changes, L sided weakness    History Obtained From:  electronic medical record    History of Present Illness:         Lesli Ovalle is a 64 y.o. female with PMH of HTN, HLD, GERD, obesity, tobacco abuse who presented with left sided weakness and visual field defects to [facility] on 10/2. CT showed R MCA stroke with occlusion of R MCA on CTA. Deemed not appropriate for intervention given size of stroke. MRI on 10/4 confirmed stroke. Plan was to D/c to SNF but patient became agitated with concern for non-convulsive status. She was transferred to [facility] and CT showed increased in midline shift. Subjective:         Past Medical History:        Diagnosis Date    Hypertension        Past Surgical History:        Procedure Laterality Date    CHOLECYSTECTOMY      HYSTERECTOMY         Current Medications:    Medications Prior to Admission: losartan (COZAAR) 25 MG tablet, Take 1 tablet by mouth daily  atorvastatin (LIPITOR) 80 MG tablet, Take 1 tablet by mouth nightly  amLODIPine (NORVASC) 10 MG tablet, Take 1 tablet by mouth daily  aspirin 81 MG chewable tablet, Take 1 tablet by mouth daily  carvedilol (COREG) 12.5 MG tablet, Take 1 tablet by mouth 2 times daily  omeprazole (PRILOSEC) 20 MG delayed release capsule, Take 1 capsule by mouth daily    Allergies:  Bee pollen, Lisinopril, and Varenicline    Social History:    · TOBACCO: reports that she has been smoking cigarettes. She started smoking about 41 years ago. She has a 20.00 pack-year smoking history. She has never used smokeless tobacco.  · ETOH:   reports no history of alcohol use. · Patient currently lives with family brother    Review of Systems -   Review of Systems   Unable to perform ROS: Mental status change       Objective:          Physical Exam  Constitutional:       Appearance: She is ill-appearing.    Cardiovascular:      Rate and Rhythm: Normal rate and regular rhythm. Heart sounds: Normal heart sounds. Pulmonary:      Effort: Pulmonary effort is normal.      Breath sounds: Normal breath sounds. Abdominal:      General: Bowel sounds are normal.      Palpations: Abdomen is soft. Musculoskeletal:      Right lower leg: No edema. Left lower leg: No edema. Skin:     General: Skin is warm and dry. Neurological:      Mental Status: She is disoriented. Palliative Performance Scale:  [] 60% Ambulation reduced; Significant disease; Can't do hobbies/housework; intake normal or reduced; occasional assist; LOC full/confusion  [] 50% Mainly sit/lie; Extensive disease; Can't do any work; Considerable assist; intake normal  Or reduced; LOC full/confusion  [] 40% Mainly in bed; Extensive disease; Mainly assist; intake normal or reduced; occasional assist; LOC full/confusion  [x] 30% Bed Bound; Extensive disease; Total care; intake reduced; LOC full/confusion  [] 20% Bed Bound; Extensive disease; Total care; intake minimal; Drowsy/coma  [] 10% Bed Bound; Extensive disease; Total care; Mouth care only; Drowsy/coma  [] 0% Death    PPS: 30    Vitals:    BP (!) 165/145   Pulse 95   Temp 99.4 °F (37.4 °C) (Axillary)   Resp 18   Ht 5' 4.02\" (1.626 m)   Wt 195 lb 1.7 oz (88.5 kg)   SpO2 100%   BMI 33.47 kg/m²     Labs:    BMP:   Recent Labs     10/11/21  0616 10/12/21  0611 10/12/21  0800   * 137 139   K 3.7 3.9  --    CL 97* 100  --    CO2 23 22  --    BUN 45* 52*  --    CREATININE 1.1 1.2  --    GLUCOSE 134* 132*  --      CBC:   Recent Labs     10/11/21  0616 10/12/21  0611   WBC 18.2* 19.4*   HGB 9.7* 9.1*   HCT 28.0* 26.9*    366       LFT's: No results for input(s): AST, ALT, ALB, BILITOT, ALKPHOS in the last 72 hours. Troponin: No results for input(s): TROPONINI in the last 72 hours. BNP: No results for input(s): BNP in the last 72 hours. ABGs: No results for input(s): PHART, AGH4YTR, PO2ART in the last 72 hours.   INR: No results for input(s): INR in the last 72 hours. U/A:  Recent Labs     10/09/21  1232   COLORU Yellow   PHUR 5.5   CLARITYU Clear   SPECGRAV 1.025   LEUKOCYTESUR Negative   UROBILINOGEN 0.2   BILIRUBINUR SMALL*   BLOODU Negative   GLUCOSEU Negative       MRI BRAIN W WO CONTRAST   Final Result      1. Large, acute right MCA distribution infarct. This results in marked mass effect and right to left midline shift as well as subfalcine herniation intraventricular compression. Small amount of acute intracranial hemorrhage posteriorly involving the    occipital/parietal lobe which likely reflects a combination of subarachnoid and parenchymal hemorrhage. 2. Occlusion of the proximal M1 segment of the right middle cerebral artery. 3. Background of white matter disease, consistent with chronic small vessel ischemic change. Conclusion/Time spent:         Recommendations see above    Time spent with patient and/or family: 40  Time reviewing records: 10 min   Time communicating with staff: 5 min     A total of 55 minutes spent with the patient and family on unit greater than 50% in counseling regarding palliative care and in goals of care for the patient. Thank you to Dr. Mian Rao for this consultation. We will continue to follow Ms. Parnell's care as needed.     [address] [facility]  Inpatient Palliative Care  [phone]

## 2021-10-12 NOTE — PLAN OF CARE
Problem: Nutrition  Intervention: Swallowing evaluation  SLP completed evaluation. Please refer to notes in EMR.     Carrie Loza M.A., [address]  Speech-Language Pathologist

## 2021-10-12 NOTE — SIGNIFICANT EVENT
Significant event    STAT MRI brain ordered by neurology showed increased mass effect and right to left midline shift as well as sub falcine herniation with intraventricular compression. Neurosurgery was promptly notified, hypertonic saline was started. Pt was extremely restless and was moving about in the bed, had to give 2 mg ativan for the MRI earlier and then for the femoral line placement. Pt is moving her right UE and b/l LE spontaneously, left UE is flaccid. Does not follow commands. PERRL with no facial droop. Pt answers orientation questions incorrectly. Speech is comprehensible however is incoherent. HOB is at 30 degrees and pt is restrained however she is still moving about in the bed.  Will continue q1h scar    [id number] [facility]  PGY2

## 2021-10-12 NOTE — PLAN OF CARE
I have spoken with the NP from Neurosurgery regarding this patient and have reviewed the chart and images. Imaging shows a large right MCA territory stroke with cerebral edema and brain compression causing 9-10mm of midline shift. This is increased when compared to the CT on 10/6. However, this stroke is still quite mature and, with onset 10/2, well outside the window for beneficial decompressive craniectomy. Briefly, patient is appropriate for admission for medical supportive care. The immediate plan of care will involve hemodynamic support and management of cerebral edema with osmotic agents (hypertonic saline). No role for surgery at this time.

## 2021-10-12 NOTE — CONSULTS
ICU Consult Note    Admit Date: 10/11/2021  Day:  2  Vent Day: None  IV Access:Peripheral, Fem line  IV Fluids:3% NS  Vasopressors:None                Antibiotics: None  Diet: Diet NPO    CC: LUE weakness, AMS    Interval history:  STAT MRI brain ordered by neurology showed increased mass effect and right to left midline shift as well as sub falcine herniation with intraventricular compression. Small SAH and intraparenchymal hemorrhage. Neurosurgery was promptly notified, hypertonic saline was started. Patient agitated overnight requiring Ativan  This morning patient is restless, oddly positioned in bed (feet dangling). Does not respond to questions and responds only to pain. On 3% NS      HPI:  Wilbur Prasad is a 64year old Female with a PmHx of HTN, HLD, GERD, obesity, tobacco use who presented with one day of left-sided vision changes, fine motor deficits (dropping objects) in the L hand, and LUE paresthesia. Transferred for worsening lethargy and disorientation.     She was previously admitted for eye vision changes and headache. Hx of reporting stroke like symptoms in March for which she got an MRI. CT on 10/2 admission showed a large R MCA occlusion and Sulcal effacement throughout the right cerebral hemisphere with partial effacement of the right lateral and 3rd ventricles and minimal right left midline shift measuring 2 mm. Evidence of chronic infarcts. She was not a tPA or thrombectomy candidate. Head CT on 10/11 showed a large infarct on the right, with increased midline shift to the left. Elevated WBC count, with negative chest XR and urinalysis     Concern at [address] [place] [address] for non-convulsive SE given patient's reported deteriorating mental status. Transferred to [place] [address] for consideration of 3% saline infusion and/or continuous EEG.     Medications:     Scheduled Meds:   sodium chloride flush  5-40 mL IntraVENous 2 times per day    [Held by provider] enoxaparin  40 mg SubCUTAneous Daily    famotidine (PEPCID) injection  20 mg IntraVENous Daily    amLODIPine  10 mg Oral Daily    carvedilol  12.5 mg Oral BID     atorvastatin  80 mg Oral Nightly    [Held by provider] aspirin  81 mg Oral Daily    insulin lispro  0-6 Units SubCUTAneous TID     insulin lispro  0-3 Units SubCUTAneous Nightly     Continuous Infusions:   sodium chloride 25 mL/hr (10/12/21 0300)    sodium chloride      dextrose       PRN Meds:sodium chloride flush, sodium chloride, ondansetron **OR** ondansetron, polyethylene glycol, acetaminophen **OR** acetaminophen, glucose, dextrose, glucagon (rDNA), dextrose    Objective:   Vitals:   T-max:  Patient Vitals for the past 8 hrs:   BP Pulse Resp SpO2   10/12/21 0600 -- 77 14 96 %   10/12/21 0500 (!) 163/105 95 16 98 %   10/12/21 0400 (!) 142/66 94 19 96 %   10/12/21 0300 119/72 80 15 94 %   10/12/21 0200 (!) 164/94 95 16 100 %   10/12/21 0100 (!) 142/74 94 17 96 %   10/12/21 0000 (!) 139/123 93 14 98 %   10/11/21 2300 123/75 98 (!) 31 96 %     No intake or output data in the 24 hours ending 10/12/21 0612    Review of Systems   Unable to perform ROS: Mental status change       Physical Exam  Constitutional:       Appearance: She is ill-appearing. Comments: Patient is lethargic and responsive only to pain. HENT:      Head: Normocephalic. Right Ear: External ear normal.      Left Ear: External ear normal.      Nose: Nose normal.   Eyes:      General:         Right eye: No discharge. Left eye: No discharge. Extraocular Movements: Extraocular movements intact. Conjunctiva/sclera: Conjunctivae normal.      Pupils: Pupils are equal, round, and reactive to light. Cardiovascular:      Rate and Rhythm: Normal rate and regular rhythm. Pulses: Normal pulses. Heart sounds: Normal heart sounds. No murmur heard. No gallop. Pulmonary:      Effort: Pulmonary effort is normal. No respiratory distress. Breath sounds: Normal breath sounds. No wheezing.       Comments: mass effect and right to left midline shift as well as subfalcine herniation intraventricular compression. Small amount of acute intracranial hemorrhage posteriorly involving the    occipital/parietal lobe which likely reflects a combination of subarachnoid and parenchymal hemorrhage. 2. Occlusion of the proximal M1 segment of the right middle cerebral artery. 3. Background of white matter disease, consistent with chronic small vessel ischemic change. Assessment/Plan:   Romero Garcia is a 64year old Female with a PmHx of HTN, HLD, GERD, obesity, tobacco use who presented with one day of left-sided vision changes, fine motor deficits (dropping objects) in the L hand, and LUE paresthesia. Transferred for worsening lethargy and disorientation. Suspected non-convulsive SE  Transferred to [facility] for potential cEEG and/or saline 3%. No observed clonus. Patient has become increasingly lethargic, could be post-ictal.  - Neurology consulted, recs appreciated  - MRI ordered upon admission  - q1h neuro checks  - Ordered cEEG     Large RMCA subacute ischemic stroke  SAH and intraparenchymal hemorrhage conversion  Likely thromboembolic. Her CTA showed an occlusion of the origin of the RMCA, also significant stenoses of the LMCA. ECHO was normal. Uncontrolled HTN could contribute. Patient is more than a week out from stroke onset so pt is unlikely to be a neurosurgery candidate. Worsening mass effect and development of SAH and intraparenchymal hemorrhage  - holding aspirin, Lovenox  - statin  - PT/OT  - MRI head showed increased mass effect and right to left midline shift as well as sub falcine herniation with intraventricular compression. Small SAH and intraparenchymal hemorrhage.  - Neurosurgery consulted, appreciate recs: started 3% NS  - Elevated HOB     HTN, poorly controlled  Outside hospital resumed home amlodipine and carvedilol but patient has had fluctuating highs and lows of BP.  Orthostatic hypotension

## 2021-10-12 NOTE — PROGRESS NOTES
Occupational Therapy   Occupational Therapy Initial Assessment/Treatment   Date: 10/12/2021   Patient Name: Ricardo Navas  MRN: [id number]     : 1960    Date of Service: 10/12/2021    Discharge Recommendations:  Ricardo Navas scored a 6/24 on the AM-PAC ADL Inpatient form. Current research shows that an AM-PAC score of 17 or less is typically not associated with a discharge to the patient's home setting. Based on the patient's AM-PAC score and their current ADL deficits, it is recommended that the patient have 3-5 sessions per week of Occupational Therapy at d/c to increase the patient's independence. Please see assessment section for further patient specific details. If patient discharges prior to next session this note will serve as a discharge summary. Please see below for the latest assessment towards goals. Assessment   Performance deficits / Impairments: Decreased functional mobility ; Decreased safe awareness;Decreased balance;Decreased vision/visual deficit; Decreased ADL status; Decreased ROM; Decreased strength;Decreased coordination;Decreased high-level IADLs;Decreased cognition;Decreased fine motor control;Decreased endurance;Decreased sensation;Decreased posture  Assessment: Pt is a 63 yo female who presents to [facility] 2/2 CVA. Prior to admission pt was independent w/ ADLs and functional mobility. Pt is currently functioning well below her baseline and she is requiring assist x2 for supine <> sit transfers and max-total A to sit at EOB. Pt was unable to keep her eyes open today and she was non responsive to stimuli. Pt benefits from ongoing OT to maximize functional independence and will benefit from continued therapy after d/c.   Treatment Diagnosis: Decreased ADL status and decreased balance 2/2 CVA  Prognosis: Fair  Decision Making: High Complexity    OT Education: OT Role;Orientation;Plan of Care;Precautions  Patient Education: no carry of learning anticipated- reinforce as able  REQUIRES OT FOLLOW UP: Yes  Activity Tolerance  Activity Tolerance: Treatment limited secondary to decreased cognition  Activity Tolerance: Pt unresponsive to stimuli  Safety Devices  Safety Devices in place: Yes  Type of devices: Call light within reach; Bed alarm in place; Left in bed;Nurse notified (sitter present)  Restraints  Initially in place: Yes  Restraints: RUE soft wrist restraint reapplied           Patient Diagnosis(es): The primary encounter diagnosis was Brain herniation ([facility] [address] Diagnoses of Acute CVA (cerebrovascular accident) [facility]) and Hypertensive emergency were also pertinent to this visit. has a past medical history of GERD (gastroesophageal reflux disease), HLD (hyperlipidemia), Hypertension, and Stroke ([facility] [address] has a past surgical history that includes Hysterectomy and Cholecystectomy. Treatment Diagnosis: Decreased ADL status and decreased balance 2/2 CVA      Restrictions  Restrictions/Precautions  Restrictions/Precautions: General Precautions, Fall Risk, Up as Tolerated, Seizure  Position Activity Restriction  Other position/activity restrictions: HOB elevated to 30*    Subjective   General  Chart Reviewed: Yes  Patient assessed for rehabilitation services?: Yes  Additional Pertinent Hx: Lucie Duane is a 64 y.o. female with PHx sig for HTN, HLD, GERD, obesity, tobacco abuse who initially presented to [facility] on 10/2/2021 with left sided field cut and left hand clumsiness. Her initial head CT showed a right MCA stroke. Her CTA showed occlusion of the right MCA at the origin  Family / Caregiver Present: No  Referring Practitioner: Dr. Laurent Vaca  Diagnosis: CVA with Left side hemiparesis, Left visual neglect  Subjective  Subjective: Pt was semi supine in bed upon arrival w/ sitter present. Pt was not opening her eyes and she was not responsive to stimuli  Patient Currently in Pain:  (Unable to assess 2/2 cognition.  No facial grimaces or outward signs of pain noted during mobility)  Vital Signs  Temp: 99.1 °F (37.3 °C)  Temp Source: Axillary  Pulse: 90  Heart Rate Source: Monitor  Resp: 18  BP: (!) 145/83  BP Location: Left upper arm  MAP (mmHg): 102  Patient Position: Semi fowlers  Level of Consciousness: Responds to Voice (1)  MEWS Score: 1  Patient Currently in Pain:  (Unable to assess 2/2 cognition. No facial grimaces or outward signs of pain noted during mobility)  Oxygen Therapy  SpO2: 99 %  Pulse Oximeter Device Mode: Continuous  Pulse Oximeter Device Location: Finger  O2 Device: None (Room air)    Social/Functional History  Social/Functional History  Lives With: Family (brother)  Type of Home: House  Home Layout: One level  Home Access: Stairs to enter with rails  Entrance Stairs - Number of Steps: 3  Entrance Stairs - Rails: Both  Bathroom Shower/Tub: Tub/Shower unit  Bathroom Toilet: Handicap height  Bathroom Equipment: Grab bars around toilet  Home Equipment: Cane, Standard walker, Reacher  ADL Assistance: Independent  Homemaking Assistance: Independent  Homemaking Responsibilities: Yes  Meal Prep Responsibility: Primary  Laundry Responsibility: Primary  Cleaning Responsibility: Primary  Shopping Responsibility: Primary  Ambulation Assistance: Independent (no AD)  Transfer Assistance: Independent  Active : Yes  Mode of Transportation: Car  Occupation: On disability  Type of occupation:  in elementary school  Leisure & Hobbies: walk, 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  Additional Comments: All of the PLOF was taken from the chart 2/2 pt unable to provide any information. Objective        Orientation  Orientation Level: Unable to assess (Pt unable to respond appropriately)    Observation/Palpation  Posture: Poor (fwd flexed with downward gaze)  Observation: R femoral IV    Balance  Sitting Balance: Dependent/Total (Seated EOB for ~10 mins)     Tone RUE  RUE Tone:  (Extensor tone RUE and RLE)  Tone LUE  LUE Tone:  (L elbow flexor tone.  Flaccid shoulder and hand)       Bed mobility  Supine to Sit: Dependent/Total;2 Person assistance (Assist x 2)  Sit to Supine: Dependent/Total;2 Person assistance (Assist x 2)  Scooting: Dependent/Total;2 Person assistance (assist x2 to scoot up in bed)  Comment: Pt sat at EOB for ~10 mins w/ max A to maintain trunk control. Pt was pushing to the L w/ her RUE at times and required max VCs and assistance to place RUE on RLE. Pt demonstrated RLE extensor tone and RUE extensor tone at times which resulted in pt nearly sliding off the edge of the bed. Pt kept her eyes closed throughout most of the session and she was not responding to stimuli. Cognition  Overall Cognitive Status: Exceptions  Arousal/Alertness: Unresponsive to stimuli  Following Commands: Does not follow commands  Attention Span: Unable to maintain attention  Safety Judgement: Decreased awareness of need for safety;Decreased awareness of need for assistance  Problem Solving: Decreased awareness of errors  Insights: Not aware of deficits  Initiation: Requires cues for all  Sequencing: Requires cues for all  Cognition Comment: Pt was not responsive to any commands during session and kept her eyes closed almost the entire time. Pt demonstrated withdrawal of RUE at times however action did not appear purposeful. Perception  Unilateral Attention: Cues to maintain midline in sitting       Sensation  Overall Sensation Status:  (Unable to assess 2/2 cognition and poor command following)          LUE PROM (degrees)  LUE General PROM: L elbow flexor tone  RUE PROM (degrees)  RUE General PROM: RUE extensor tone.  Minimal movement noted w/ RUE however it appeared non purposeful                        Plan   Plan  Times per week: 2-5x  Times per day: Daily  Current Treatment Recommendations: Strengthening, ROM, Balance Training, Functional Mobility Training, Safety Education & Training, Positioning, Self-Care / ADL, Neuromuscular Re-education, Cognitive/Perceptual Training, Patient/Caregiver Education & Training    G-Code     OutComes Score                                                  AM-PAC Score        AM-PAC Inpatient Daily Activity Raw Score: 6 (10/12/21 1302)  AM-PAC Inpatient ADL T-Scale Score : 17.07 (10/12/21 1302)  ADL Inpatient CMS 0-100% Score: 100 (10/12/21 1302)  ADL Inpatient CMS G-Code Modifier : CN (10/12/21 1302)    Goals  Short term goals  Time Frame for Short term goals: By D/C  Short term goal 1: Pt will tolerate seated EOB for 10 mins w/ mod A  Short term goal 2: Pt will participate in bed to chair transfer  Short term goal 3: Pt will keep eyes open ~50% of the session w/ VCs       Therapy Time   Individual Concurrent Group Co-treatment   Time In 1137         Time Out 1205         Minutes 28         Timed Code Treatment Minutes: [address], OT   If patient discharges prior to next treatment, this note will serve as discharge summary. WiIll continue per plan of care if patient does not discharge.

## 2021-10-12 NOTE — PROGRESS NOTES
environment. Impression  Dysphagia Diagnosis: Suspected needs further assessment;Severe pharyngeal stage dysphagia; Severe oral stage dysphagia  Dysphagia Impression : Patient presents with suspected severe oropharyngeal dysphagia in the setting of altered mental status, lethargy, post acute CVA. Completed oral hygiene with suction kit; pt with inconsistent inappropriate biting on suction despite cues. Assessed tolerance ice chips; pt with limited oral acceptance, anterior labial loss of bolus, deficient oral prep, no clear evidence of swallow initiation/laryngeal movement. Pt remains a high aspiration/choking risk at this time. Recommend continue strict NPO, oral hygiene w/ suction kit as able, consider small amounts ice chips when sufficiently alert. Will continue to follow. Dysphagia Outcome Severity Scale: Level 1: Severe dysphagia- NPO. Unable to tolerate any PO safely     Treatment Plan  Requires SLP Intervention: Yes  Duration/Frequency of Treatment: 3-5x/wk for LOS  D/C Recommendations: To be determined  Referral To: Speech Evaluation    Recommended Diet and Intervention  Diet Solids Recommendation: NPO  Liquid Consistency Recommendation: NPO  Recommended Form of Meds: Via alternative means of nutrition  Recommendations: NPO;Dysphagia treatment;Consider ice chips PRN;Consider alternative nutrition  Therapeutic Interventions: Oral care; Therapeutic PO trials with SLP;Patient/Family education;Effortful swallow    Compensatory Swallowing Strategies  Oral hygiene w/ suction kit, upright position    Treatment/Goals  Short-term Goals  Timeframe for Short-term Goals: 1-2 wks or LOS  Goal 1: Patient will participate in ongoing assessment of swallow function as appropriate.     General  Chart Reviewed: Yes  Comments: Per admitting H&P (10/11/2021): 'Becky Borjas is a 64year old Female with a PmHx of HTN, HLD, GERD, obesity, tobacco use who presented with one day of left-sided vision changes, fine motor deficits (dropping objects) in the L hand, and LUE paresthesia. Transferred for worsening lethargy and disorientation. She was previously admitted for eye vision changes and headache. Hx of reporting stroke like symptoms in March for which she got an MRI. CT on 10/2 admission showed a large R MCA occlusion and Sulcal effacement throughout the right cerebral hemisphere with partial effacement of the right lateral and 3rd ventricles and minimal right left midline shift measuring 2 mm. Evidence of chronic infarcts. She was not a tPA or thrombectomy candidate. Head CT on 10/11 showed a large infarct on the right, with increased midline shift to the left. Elevated WBC count, with negative chest XR and urinalysis Concern at [facility] for non-convulsive SE given patient's reported deteriorating mental status. Transferred to [facility] for consideration of 3% saline infusion and/or continuous EEG.'  Subjective  Subjective: Patient lethargic, resting in bed, on room air. Not following commands. Intermittent verbalizations, largely unintelligible. Behavior/Cognition: Lethargic;Doesn't follow directions  Respiratory Status: Room air  O2 Device: None (Room air)  Communication Observation: Dysarthria  Follows Directions: None  Dentition: Some missing teeth  Patient Positioning: Upright in bed  Baseline Vocal Quality: Weak  Volitional Cough:  (did not elicit)  Consistencies Administered: Ice Chips    Prior Dysphagia History:  Pt recently seen during hospitalization at [facility]. 10/04/2021-10/11/2021 prior to transfer here [facility]). Per most recent dysphagia note (10/10/21), recommendations were for dysphagia 1 puree and thin liquids. Pt qA also seen for speech/language/cognition (10/04/2021), with moderate-severe cognitive-linguistic impairment identified, as well as mild dysarthria. Vision/Hearing  Vision  Vision: Impaired  Vision Exceptions: Wears glasses at all times; Visual field cut  Hearing  Hearing:  (unable to assess)    Oral Motor Deficits  Oral/Motor  Oral Motor: Exceptions to [facility]  Labial ROM: Reduced left  Labial Symmetry: Abnormal symmetry left  Labial Strength: Reduced  Labial Sensation: Reduced    Prognosis  Prognosis  Prognosis for safe diet advancement: guarded  Barriers to reach goals: severity of dysphagia;inconsistent alertness;fatigue;cognitive deficits  Individuals consulted  Consulted and agree with results and recommendations: RN    Education  Patient Education: Purpose of visit, swallow function, dysphagia  Patient Education Response: No evidence of learning  Safety Devices in place: Yes  Type of devices: All fall risk precautions in place; Left in bed;Bed alarm in place;Nurse notified; Other (comment) (in soft restraints; sitter bedside)       Therapy Time  SLP Individual Minutes  Time In: 1200  Time Out: 9405  Minutes: 19     SLP Total Treatment Time  Timed Code Treatment Minutes: 0 Minutes  Total Treatment Time: 19    Plan  Diet Recommendations:     NPO, oral hygiene w/ suction kit as able, consider small amounts ice chips when sufficiently alert. Prognosis for improved swallow function w/in 24 hrs is guarded. Recommend consider temporary alternative means nutrition/hydration. Discharge Plan:  TBD  Discussed with RN, Maury Rivera. Needs within reach. Electronically Signed by:  Matthew Lima M.A., Netta Carl   Speech-Language Pathologist  Pager #[phone]    This document will serve as a discharge summary if pt discharges before next treatment.

## 2021-10-12 NOTE — PROCEDURES
Eric Sullivan is a 64 y.o. female patient. No diagnosis found. Past Medical History:   Diagnosis Date    Hypertension      Blood pressure (!) 139/123, pulse 93, temperature 98 °F (36.7 °C), resp. rate 14, height 5' 4.02\" (1.626 m), weight 195 lb 1.7 oz (88.5 kg), SpO2 98 %. Central Line    Date/Time: 10/12/2021 3:06 AM  Performed by: Yudith Celeste MD  Authorized by: Yudith Celeste MD   Consent: The procedure was performed in an emergent situation. Risks and benefits: risks, benefits and alternatives were discussed  Site marked: the operative site was marked  Required items: required blood products, implants, devices, and special equipment available  Patient identity confirmed: arm band  Time out: Immediately prior to procedure a \"time out\" was called to verify the correct patient, procedure, equipment, support staff and site/side marked as required. Indications: 3% NS.   Anesthesia: local infiltration    Anesthesia:  Local Anesthetic: lidocaine 1% without epinephrine  Anesthetic total: 10 mL    Sedation:  Patient sedated: no    Preparation: skin prepped with 2% chlorhexidine  Skin prep agent dried: skin prep agent completely dried prior to procedure  Sterile barriers: all five maximum sterile barriers used - cap, mask, sterile gown, sterile gloves, and large sterile sheet  Hand hygiene: hand hygiene performed prior to central venous catheter insertion  Location details: right femoral  Catheter type: triple lumen  Catheter size: 7 Fr  Pre-procedure: landmarks identified  Ultrasound guidance: yes  Sterile ultrasound techniques: sterile gel and sterile probe covers were used  Number of attempts: 3  Successful placement: yes  Post-procedure: line sutured and dressing applied  Assessment: blood return through all ports and free fluid flow  Patient tolerance: patient tolerated the procedure well with no immediate complications  Comments: EBL < 5 cc          Yudith Celeste MD  10/12/2021

## 2021-10-12 NOTE — CONSULTS
HYSTERECTOMY         FAMILY HISTORY:  family history is not on file. SOCIAL HISTORY:  she reports that she has been smoking cigarettes. She started smoking about 41 years ago. She has a 20.00 pack-year smoking history. She has never used smokeless tobacco. She reports that she does not drink alcohol and does not use drugs. Social History     Socioeconomic History    Marital status: Single     Spouse name: Not on file    Number of children: Not on file    Years of education: Not on file    Highest education level: Not on file   Occupational History    Not on file   Tobacco Use    Smoking status: Current Some Day Smoker     Packs/day: 0.50     Years: 40.00     Pack years: 20.00     Types: Cigarettes     Start date: 36    Smokeless tobacco: Never Used    Tobacco comment: only have smoked a couple cig since hospital   Substance and Sexual Activity    Alcohol use: No    Drug use: No    Sexual activity: Not on file   Other Topics Concern    Not on file   Social History Narrative    Not on file     Social Determinants of Health     Financial Resource Strain:     Difficulty of Paying Living Expenses:    Food Insecurity:     Worried About [address] [employer] in the Last Year:     [address] [employer] in the Last Year:    Transportation Needs:     Lack of Transportation (Medical):      Lack of Transportation (Non-Medical):    Physical Activity:     Days of Exercise per Week:     Minutes of Exercise per Session:    Stress:     Feeling of Stress :    Social Connections:     Frequency of Communication with Friends and Family:     Frequency of Social Gatherings with Friends and Family:     Attends Shinto Services:     Active Member of Clubs or Organizations:     Attends Club or Organization Meetings:     Marital Status:    Intimate Partner Violence:     Fear of Current or Ex-Partner:     Emotionally Abused:     Physically Abused:     Sexually Abused:        HOME MEDICATIONS:  Medications Prior to Admission: losartan (COZAAR) 25 MG tablet, Take 1 tablet by mouth daily  atorvastatin (LIPITOR) 80 MG tablet, Take 1 tablet by mouth nightly  amLODIPine (NORVASC) 10 MG tablet, Take 1 tablet by mouth daily  aspirin 81 MG chewable tablet, Take 1 tablet by mouth daily  carvedilol (COREG) 12.5 MG tablet, Take 1 tablet by mouth 2 times daily  omeprazole (PRILOSEC) 20 MG delayed release capsule, Take 1 capsule by mouth daily    CURRENT MEDICATIONS:    Current Facility-Administered Medications:     sodium chloride 3 % solution, 45-90 mL/hr, IntraVENous, Continuous, Aden Thomas MD, Last Rate: 45 mL/hr at 10/12/21 0807, 45 mL/hr at 10/12/21 0807    sodium chloride flush 0.9 % injection 5-40 mL, 5-40 mL, IntraVENous, 2 times per day, Devorah Penny MD, 10 mL at 10/11/21 2115    sodium chloride flush 0.9 % injection 5-40 mL, 5-40 mL, IntraVENous, PRN, Devorah Penny MD    0.9 % sodium chloride infusion, 25 mL, IntraVENous, PRN, MD Mulugeta Barros  [facility] [place] by provider] enoxaparin (LOVENOX) injection 40 mg, 40 mg, SubCUTAneous, Daily, Devorah Penny MD, 40 mg at 10/11/21 2106    ondansetron (ZOFRAN-ODT) disintegrating tablet 4 mg, 4 mg, Oral, Q8H PRN **OR** ondansetron (ZOFRAN) injection 4 mg, 4 mg, IntraVENous, Q6H PRN, Devorah Penny MD    polyethylene glycol (GLYCOLAX) packet 17 g, 17 g, Oral, Daily PRN, Devorah Penny MD    acetaminophen (TYLENOL) tablet 650 mg, 650 mg, Oral, Q6H PRN **OR** acetaminophen (TYLENOL) suppository 650 mg, 650 mg, Rectal, Q6H PRN, Devorah Penny MD    famotidine (PEPCID) injection 20 mg, 20 mg, IntraVENous, Daily, Devorah Penny MD    amLODIPine (NORVASC) tablet 10 mg, 10 mg, Oral, Daily, Janice Woody MD    carvedilol (COREG) tablet 12.5 mg, 12.5 mg, Oral, BID WC, Devorah Penny MD    atorvastatin (LIPITOR) tablet 80 mg, 80 mg, Oral, Nightly, Devorah Penny MD    [Held by provider] aspirin chewable tablet 81 mg, 81 mg, Oral, Daily, Janice Woody MD    insulin lispro (1 Unit Dial) 0-6 Units, 0-6 Units, SubCUTAneous, TID WC, Kofi Lanza MD    insulin lispro (1 Unit Dial) 0-3 Units, 0-3 Units, SubCUTAneous, Nightly, Kofi Lanza MD, 1 Units at 10/11/21 2304    glucose (GLUTOSE) 40 % oral gel 15 g, 15 g, Oral, PRN, Kofi Lanza MD    dextrose 50 % IV solution, 12.5 g, IntraVENous, PRN, Kofi Lanza MD    glucagon (rDNA) injection 1 mg, 1 mg, IntraMUSCular, PRN, Kofi Lanza MD    dextrose 5 % solution, 100 mL/hr, IntraVENous, PRN, Kofi Lanza MD      ROS:   Unable to obtain due to altered mental status     Constitutional  BP (!) 163/105   Pulse 77   Temp 98 °F (36.7 °C)   Resp 14   Ht 5' 4.02\" (1.626 m)   Wt 195 lb 1.7 oz (88.5 kg)   SpO2 96%   BMI 33.47 kg/m²     General Restless  Cardiovascular: Rate regular. No murmurs  Respiratory: No adventitious breath sounds    Neurologic  Mental status: lethargic. Arouses to voice. Can tell me her name. Follows simple commands on the right     Cranial nerves:   CN2: left homonymous hemianopsia   CN 3,4,6: pupils equal and reactive to light, right gaze preference   CN5: facial sensation symmetric   CN7: left nasolabial flattening on rest and activation   CN8: hearing symmetric to voice    Motor Exam:  RUE: Strong throughout   LUE: No movement to pain   RLE: Withdraws to painful stim  LLE: withdraws to painful stim     Deep tendon reflexes:    R  L    Biceps  3+ 3+   Brachioradialis  3+ 3+   Patellar  3+ 3+   Toes down Up      Sensory: pain intact in all 4 extremities   Tone: increased in LUE        Images: All results below personally reviewed. Pertinent positives & negatives are addressed in Assessment & Plan section of note    MRI brain wo contrast, 10/11/2021:  1. Large, acute right MCA distribution infarct. This results in marked mass effect and right to left midline shift as well as subfalcine herniation intraventricular compression.  Small amount of acute intracranial hemorrhage posteriorly involving the occipital/parietal lobe which likely reflects a combination of subarachnoid and parenchymal hemorrhage. 2. Occlusion of the proximal M1 segment of the right middle cerebral artery. 3. Background of white matter disease, consistent with chronic small vessel ischemic change. CT head wo contrast, 10/11/2021:  Large infarct on the right, with increased midline shift to the left. No obvious hemorrhage noted  Nodularity the posterior nasopharynx, similar to prior. CTA head and neck:   1. Occlusion of the right middle cerebral artery at the origin may be acute. Overall decreased size and number of enhancing right middle cerebral artery branches in comparison to the left. This correlates with findings of large acute/subacute right middle cerebral artery territory infarct. 2.  Short segment high-grade stenosis of a left middle cerebral artery M2 branch. Mild-to-moderate stenosis in the left middle cerebral artery M1 segment. Diffuse atherosclerotic irregularity of the more distal left middle cerebral artery branches. 3.  The bilateral posterior cerebral arteries are diffusely small and not well seen, underlying disease is not excluded. 4.  No hemodynamically significant stenosis in the bilateral cervical internal carotid arteries per NASCET criteria. Bilateral carotid bulb atherosclerotic plaque. 5.  Patent bilateral vertebral arteries. MRI wo contrast, 10/4/2021:  Unchanged subacute right MCA infarct. Labs:  Sodium:     Ref. Range 10/5/2021 22:47 10/7/2021 06:19 10/8/2021 05:29 10/11/2021 06:16 10/12/2021 06:11   Sodium Latest Ref Range: 136 - 145 mmol/L 132 (L) 134 (L) 138 133 (L) 137     Lipid Panel:     Ref.  Range 10/3/2021 05:46   Cholesterol, Total Latest Ref Range: 0 - 199 mg/dL 165   HDL Cholesterol Latest Ref Range: 40 - 60 mg/dL 33 (L)   LDL Calculated Latest Ref Range: <100 mg/dL 107 (H)   Triglycerides Latest Ref Range: 0 - 150 mg/dL 124   VLDL Cholesterol Calculated Latest Ref Range: Not Established mg/dL 25     Hemoglobin A1C: 5.7    Assessment:Kike Cuellar is a 64year old woman with PMH GERD, obesity, tobacco abuse, HTN, HLD who was transferred to [facility] for altered mental status in the setting of a large right MCA stroke that happened prior to her admission on 10/2/2021. Unfortunately, she was not within the time window for tpa or mechanical thrombectomy when she initially presented with stroke. Neurosurgery is going to see her but hemicraniectomy for malignant cerebral edema is effective within 48 hours of ischemic insult and she is far outside that window.     Plan:    1.) Cerebral Edema  -3% sodium protocol with goal Na+ 145-155  -Start 3% sodium wean tomorrow  -HOB at 30 degrees, head neutral     2.) Acute Ischemic Stroke  -Resume ASA 81mg PO daily  -Resume DVT prophylaxis   -Q1H neurochecks and vital signs  -PT/OT/ST  -Delirium Precautions: Maintain a regular night-day/sleep-wake cycle: discourage daytime naps, re-orient frequently, shades up during the daytime, family at bedside as often as possible, minimize nighttime awakenings, , utilize relaxation channel on television     3.) HTN  -Treat SBP >170  -Continue home amlodipine 10mg PO daily     4.) HLD  -, goal less than 70  -Continue atorvastatin 80mg PO daily    5.) Tobacco abuse  -Smoking cessation information on discharge    RONNELL Bailey-CNP  Neurology & Neurocritical Care   Neurology Line: [phone]  [employer]: [facility] NPs

## 2021-10-12 NOTE — CONSULTS
NEUROSURGERY CONSULT NOTE    Ki Siddiqui  [id number]   1960   10/12/2021    Requesting physician: Tc Parrish MD    Reason for consultation: Cerebral Edema    History of present illness: Patient is a 64 y.o. female  has a past medical history of GERD (gastroesophageal reflux disease), HLD (hyperlipidemia), Hypertension, and Stroke ([facility] [address] (10/02/2021). Patient originally presented on 10/2/2021 to [facility], [place] ED 2/2 vision changes and left hand numbness. Patient is not a proper historian, so information provided by previous physician and nursing notes. Her initial head CT showed a right MCA stroke, and her CTA showed occlusion of the right MCA at the origin, but she was not deemed a candidate for intervention. She had an MRI on 10/4/2021 that confirmed a large, subacute infarct in the right MCA territory but did not demonstrate any cerebral edema or midline shift. She was getting ready to be discharged to a SNF but over the weekend she became more agitated and confused. CT Head was performed and demonstrated 7mm midline shift (previously 5mm). She was transferred to [facility] for further monitoring and management.      ROS:   LISA 2/2 altered mental status    Allergies   Allergen Reactions    Bee Pollen Swelling    Lisinopril Nausea Only    Varenicline Nausea And Vomiting       Past Medical History:   Diagnosis Date    GERD (gastroesophageal reflux disease)     HLD (hyperlipidemia)     Hypertension     Stroke ([facility] [address] 10/02/2021    R MCA        Past Surgical History:   Procedure Laterality Date    CHOLECYSTECTOMY      HYSTERECTOMY         Social History     Occupational History    Not on file   Tobacco Use    Smoking status: Current Some Day Smoker     Packs/day: 0.50     Years: 40.00     Pack years: 20.00     Types: Cigarettes     Start date: 1980    Smokeless tobacco: Never Used    Tobacco comment: only have smoked a couple cig since hospital   Substance and Sexual Activity    Alcohol use: No    Drug use: 10/11/21 2304    glucose (GLUTOSE) 40 % oral gel 15 g  15 g Oral PRN Dafne Levi MD        dextrose 50 % IV solution  12.5 g IntraVENous PRN Dafne Levi MD        glucagon (rDNA) injection 1 mg  1 mg IntraMUSCular PRN Dafne Levi MD        dextrose 5 % solution  100 mL/hr IntraVENous PRN Dafne Levi MD            Objective:  BP (!) 145/83   Pulse 90   Temp 99.1 °F (37.3 °C) (Axillary)   Resp 18   Ht 5' 4.02\" (1.626 m)   Wt 195 lb 1.7 oz (88.5 kg)   SpO2 99%   BMI 33.47 kg/m²     Physical Exam:   Patient seen and examined  GCS:  4 - Opens eyes on own  4 - Seems confused, disoriented  6 - Follows simple motor commands  General: Well developed. Alert and cooperative in no acute distress. HENT: atraumatic, neck supple  Eyes: Optic discs: Not tested  Pulmonary: unlabored respiratory effort  Cardiovascular:  Warm well perfused. No peripheral edema  Gastrointestinal: abdomen soft, NT, ND    Neurological:  Mental Status: Lethargic but arouses to voice  Attention: Follows simple commands on right side  Language: No aphasia or dysarthria noted  Sensation: Intact to noxious tactile stimuli in all extremities  Coordination: Intact on right side    Cranial Nerves:  II: Left homonymous hemianopsia   III, IV, VI: PERRL, 3 mm bilaterally, Left gaze preference  V: Facial sensation intact bilaterally to touch  VII: Left facial weakness  VIII: Hearing intact bilaterally to spoken voice  IX: Palate movement equal bilaterally  XI: Shoulder shrug equal bilaterally  XII: Tongue midline    Musculoskeletal:   Gait: Not tested   Assist devices: None   Tone: Increased in LUE, Normal elsewhere  Motor strength:   RUE: Strong throughout   LUE: No movement to noxious tactile stimuli  RLE: Withdraws to noxious tactile stimuli  LLE: withdraws to noxious tactile stimuli    Radiological Findings:  MRI BRAIN W WO CONTRAST  Result Date: 10/11/2021  1. Large, acute right MCA distribution infarct.  This results in marked mass effect and right to left midline shift as well as subfalcine herniation intraventricular compression. Small amount of acute intracranial hemorrhage posteriorly involving the occipital/parietal lobe which likely reflects a combination of subarachnoid and parenchymal hemorrhage. 2. Occlusion of the proximal M1 segment of the right middle cerebral artery. 3. Background of white matter disease, consistent with chronic small vessel ischemic change. Labs:  Recent Labs     10/12/21  0611   WBC 19.4*   HGB 9.1*   HCT 26.9*          Recent Labs     10/12/21  0611 10/12/21  0800 10/12/21  1000      < > 139   K 3.9  --   --      --   --    CO2 22  --   --    BUN 52*  --   --    CREATININE 1.2  --   --    GLUCOSE 132*  --   --    CALCIUM 9.4  --   --     < > = values in this interval not displayed. No results for input(s): PROTIME, INR, APTT in the last 72 hours. Patient Active Problem List    Diagnosis Date Noted    Acute CVA (cerebrovascular accident) ([facility] [address] 10/11/2021    Peripheral visual field defect, left     Acute ischemic stroke ([facility] [address] 10/02/2021    SOB (shortness of breath) 03/19/2021    Dizziness 03/19/2021    GERD (gastroesophageal reflux disease) 03/05/2021    Lumbar spondylosis 03/05/2021    Tobacco use disorder 03/05/2021    HTN (hypertension), benign     Dyslipidemia     CVA (cerebral vascular accident) ([facility] [address] 03/02/2021    Hypertensive emergency 03/01/2021    Hypomagnesemia 03/01/2021    Syncope 03/01/2021    Smoker 03/01/2021    Hypokalemia 03/01/2021    Thyroid nodule 03/01/2021    Hypertensive urgency 03/01/2021    Left lumbar radiculitis 06/09/2014    Myofascial muscle pain 06/09/2014       Assessment:  Patient is a 64 y.o. female w/large right MCA territory stroke with cerebral edema and brain compression causing 9-10mm of midline shift. Plan:  1. No emergent neurosurgical intervention indicated.  Stroke is quite mature and, with onset 10/2, well outside the window for beneficial decompressive craniectomy. 2. Neurologic exams frequency: Per Neurology  3. For change in exam MUST contact neurosurgery team along with critical care or primary team  4. RMCA Stroke:  - MRI Brain revealed a large, acute right MCA distribution infarct with brain compression, marked mass effect and right to left midline shift as well as subfalcine herniation intraventricular compression. Small amount of acute intracranial hemorrhage posteriorly involving the occipital/parietal lobe which likely reflects a combination of subarachnoid and parenchymal hemorrhage.  - Neurology following, appreciate recs  5. Cerebral edema:  - Managed by Neurology  - Keep HOB >30 degrees  - 3% sodium chloride infusion - per protocol to keep sodium 145-155  - NO dextrose in IVF's or in IV drips  - NO Decadron  - If central venous access is needed please use subclavian vs femoral - No IJ as this can decrease venous return and worsen cerebral edema  6. DVT Prophylaxis: SCD's & OK for chemoprophylaxis  7. Speech consulted for swallow eval, appreciate recs  8. PT/OT consulted, appreciate recs  9. Advance diet / activity per primary team  10. Appreciate critical care team assistance in management  11. Will sign off. Please call with any questions or decline in neurological status    DISPO: Dispo timing to be determined by primary team once patient is medically stable for discharge. Patient was discussed with Dr. Jayne Mejia who agrees with above assessment and plan.      Electronically signed by: Greggory Rinne, APRN - CNP, APRN-CNP, 10/12/2021 12:38 PM  [phone]

## 2021-10-13 NOTE — PLAN OF CARE
Problem: Non-Violent Restraints  Goal: Removal from restraints as soon as assessed to be safe  Outcome: Ongoing  Goal: No harm/injury to patient while restraints in use  Outcome: Ongoing  Goal: Patient's dignity will be maintained  Outcome: Ongoing     Problem: Falls - Risk of:  Goal: Will remain free from falls  Description: Will remain free from falls  Outcome: Ongoing  Goal: Absence of physical injury  Description: Absence of physical injury  Outcome: Ongoing     Problem: Skin Integrity:  Goal: Will show no infection signs and symptoms  Description: Will show no infection signs and symptoms  Outcome: Ongoing  Goal: Absence of new skin breakdown  Description: Absence of new skin breakdown  Outcome: Ongoing     Problem: HEMODYNAMIC STATUS  Goal: Patient has stable vital signs and fluid balance  Outcome: Ongoing     Problem: ACTIVITY INTOLERANCE/IMPAIRED MOBILITY  Goal: Mobility/activity is maintained at optimum level for patient  Outcome: Ongoing     Problem: COMMUNICATION IMPAIRMENT  Goal: Ability to express needs and understand communication  Outcome: Ongoing

## 2021-10-13 NOTE — PROGRESS NOTES
Interval History:  -Clinically stable  -Start 3% wean today  -Restart Aspirin     Initial CC/Reason for Consult: Right MCA ischemic stroke    Review of Systems:   LISA due to mental status    Current Medications:    Current Facility-Administered Medications:     labetalol (NORMODYNE;TRANDATE) injection 10 mg, 10 mg, IntraVENous, Q4H PRN, [place], DO, 10 mg at 10/13/21 0443    hydrALAZINE (APRESOLINE) injection 10 mg, 10 mg, IntraVENous, Q6H PRN, Kofi Lanza MD, 10 mg at 10/13/21 0126    sodium chloride flush 0.9 % injection 5-40 mL, 5-40 mL, IntraVENous, 2 times per day, Kofi Lanza MD, 10 mL at 10/12/21 2121    sodium chloride flush 0.9 % injection 5-40 mL, 5-40 mL, IntraVENous, PRN, Kofi Lanza MD    0.9 % sodium chloride infusion, 25 mL, IntraVENous, PRN, Kofi Lanza MD  [address] [place] [address] [facility] by provider] enoxaparin (LOVENOX) injection 40 mg, 40 mg, SubCUTAneous, Daily, Kofi Lanza MD, 40 mg at 10/11/21 2106    ondansetron (ZOFRAN-ODT) disintegrating tablet 4 mg, 4 mg, Oral, Q8H PRN **OR** ondansetron (ZOFRAN) injection 4 mg, 4 mg, IntraVENous, Q6H PRN, Kofi Lanza MD    polyethylene glycol (GLYCOLAX) packet 17 g, 17 g, Oral, Daily PRN, Kofi Lanza MD    acetaminophen (TYLENOL) tablet 650 mg, 650 mg, Oral, Q6H PRN **OR** acetaminophen (TYLENOL) suppository 650 mg, 650 mg, Rectal, Q6H PRN, Kofi Lanza MD    famotidine (PEPCID) injection 20 mg, 20 mg, IntraVENous, Daily, Kofi Lanza MD, 20 mg at 10/12/21 0826    amLODIPine (NORVASC) tablet 10 mg, 10 mg, Oral, Daily, Marco Graham MD    [Held by provider] carvedilol (COREG) tablet 12.5 mg, 12.5 mg, Oral, BID Kofi GALLARDO MD    atorvastatin (LIPITOR) tablet 80 mg, 80 mg, Oral, Nightly, Kofi Lanza MD    [Held by provider] aspirin chewable tablet 81 mg, 81 mg, Oral, Daily, Marco Graham MD    insulin lispro (1 Unit Dial) 0-6 Units, 0-6 Units, SubCUTAneous, TID Kofi GALLARDO MD, 1 Units at 10/12/21 1722    insulin lispro (1 Unit Dial) 0-3 Units, 0-3 Units, SubCUTAneous, Nightly, Dafne Levi MD, 1 Units at 10/11/21 2304    glucose (GLUTOSE) 40 % oral gel 15 g, 15 g, Oral, PRN, Dafne Levi MD    dextrose 50 % IV solution, 12.5 g, IntraVENous, PRN, Dafne Levi MD    glucagon (rDNA) injection 1 mg, 1 mg, IntraMUSCular, PRN, Dafne Levi MD    dextrose 5 % solution, 100 mL/hr, IntraVENous, PRN, Dafne Levi MD      Physical Exam  Constitutional  BP (!) 155/86   Pulse 95   Temp 99.7 °F (37.6 °C) (Oral)   Resp 15   Ht 5' 4.02\" (1.626 m)   Wt 195 lb 1.7 oz (88.5 kg)   SpO2 99%   BMI 33.47 kg/m²     General Restless  Cardiovascular: Rate regular. No murmurs  Respiratory: No adventitious breath sounds     Neurologic  Mental status: lethargic. Arouses to voice. Restless. Not following commands for me this morning. Cranial nerves:   CN2: left homonymous hemianopsia   CN 3,4,6: pupils equal and reactive to light, right gaze preference   CN5: facial sensation symmetric   CN7: left nasolabial flattening on rest and activation   CN8: hearing symmetric to voice     Motor Exam:  RUE: Strong throughout   LUE: No movement to pain   RLE: Withdraws to painful stim  LLE: withdraws to painful stim      Deep tendon reflexes:     R  L    Biceps  3+ 3+   Brachioradialis  3+ 3+   Patellar  3+ 3+   Toes down Up       Sensory: pain intact in all 4 extremities   Tone: increased in LUE           Images: All results below personally reviewed. Pertinent positives & negatives are addressed in Assessment & Plan section of note     MRI brain wo contrast, 10/11/2021:  1. Large, acute right MCA distribution infarct. This results in marked mass effect and right to left midline shift as well as subfalcine herniation intraventricular compression. Small amount of acute intracranial hemorrhage posteriorly involving the occipital/parietal lobe which likely reflects a combination of subarachnoid and parenchymal hemorrhage.    2. Occlusion of the proximal M1 segment of the right middle cerebral artery. 3. Background of white matter disease, consistent with chronic small vessel ischemic change. CT head wo contrast, 10/11/2021:  Large infarct on the right, with increased midline shift to the left. No obvious hemorrhage noted  Nodularity the posterior nasopharynx, similar to prior. CTA head and neck:   1. Occlusion of the right middle cerebral artery at the origin may be acute. Overall decreased size and number of enhancing right middle cerebral artery branches in comparison to the left. This correlates with findings of large acute/subacute right middle cerebral artery territory infarct. 2.  Short segment high-grade stenosis of a left middle cerebral artery M2 branch. Mild-to-moderate stenosis in the left middle cerebral artery M1 segment. Diffuse atherosclerotic irregularity of the more distal left middle cerebral artery branches. 3.  The bilateral posterior cerebral arteries are diffusely small and not well seen, underlying disease is not excluded. 4.  No hemodynamically significant stenosis in the bilateral cervical internal carotid arteries per NASCET criteria. Bilateral carotid bulb atherosclerotic plaque. 5.  Patent bilateral vertebral arteries. MRI wo contrast, 10/4/2021:  Unchanged subacute right MCA infarct. Labs:  Sodium:    Results for Kalyn Triplett (MRN [id number]) as of 10/13/2021 07:37   Ref. Range 10/12/2021 18:00 10/13/2021 00:24 10/13/2021 02:29 10/13/2021 04:26 10/13/2021 06:12   Sodium Latest Ref Range: 136 - 145 mmol/L 148 (H) 152 (H) 153 (H) 157 (H) 158 (H)     Lipid Panel:       Ref.  Range 10/3/2021 05:46   Cholesterol, Total Latest Ref Range: 0 - 199 mg/dL 165   HDL Cholesterol Latest Ref Range: 40 - 60 mg/dL 33 (L)   LDL Calculated Latest Ref Range: <100 mg/dL 107 (H)   Triglycerides Latest Ref Range: 0 - 150 mg/dL 124   VLDL Cholesterol Calculated Latest Ref Range: Not Established mg/dL 25      Hemoglobin A1C: 5.7

## 2021-10-13 NOTE — CARE COORDINATION
Case Management Assessment           Daily Note                 Date/ Time of Note: 10/13/2021 3:45 PM         Note completed by: Lona Webb RN    Patient Name: Lucie Duane  YOB: 1960    Diagnosis:Acute CVA (cerebrovascular accident) [facility]) [I63.9]  Patient Admission Status: Inpatient    Date of Admission:10/11/2021  6:35 PM Length of Stay: 2 GLOS: GMLOS: 5.1    Current Plan of Care: remains on 3% saline drip, family discussing code status and goals of care  ________________________________________________________________________________________  PT AM-PAC: 6 / 24 per last evaluation on: 10/13    OT AM-PAC: 6 / 24 per last evaluation on: 10/13    DME Needs for discharge: TBD  ________________________________________________________________________________________  Discharge Plan: To Be Determined DUE TO: family discussing goals of care    Tentative discharge date: TBD      Current barriers to discharge: not medically ready, pending medicaid    Referrals completed: Not Applicable    Resources/ information provided: Not indicated at this time  ________________________________________________________________________________________  Case Management Notes:Patient was independent prior but is now aphasic and quite limited with mobility. [facility] are not able to accept pending Medicaid and the russ was submitted on 10/7. Family is discussing goals of care for her. Letty Nguyen and her family were provided with choice of provider; she and her family are in agreement with the discharge plan.     Care Transition Patient: Lashaun Webb RN  [facility]  Case Management Department  Ph: [phone]  Fax: [phone]

## 2021-10-13 NOTE — PROGRESS NOTES
with normal S1/S2 without murmurs, rubs or gallops. Abdomen: Soft, non-tender, non-distended with normal bowel sounds. Musculoskeletal: No clubbing, cyanosis or edema bilaterally. Full range of motion without deformity. Skin: Skin color, texture, turgor normal.  No rashes or lesions. Neurologic: Right upper extremity power intact, no movement noted on the left upper extremity, withdraws to pain in both right lower and left lower extremity. Unable to fully assess as patient cannot follow commands  Psychiatric: Encephalopathic, agitated  Capillary Refill: Brisk,3 seconds, normal   Peripheral Pulses: +2 palpable, equal bilaterally       Labs:   Recent Labs     10/11/21  0616 10/12/21  0611 10/13/21  0426   WBC 18.2* 19.4* 13.8*   HGB 9.7* 9.1* 8.2*   HCT 28.0* 26.9* 24.6*    366 373     Recent Labs     10/11/21  0616 10/11/21  0616 10/12/21  0611 10/12/21  0800 10/13/21  0426 10/13/21  0612 10/13/21  0800   *   < > 137   < > 157* 158* 157*   K 3.7  --  3.9  --  4.0  --   --    CL 97*  --  100  --  124*  --   --    CO2 23  --  22  --  21  --   --    BUN 45*  --  52*  --  34*  --   --    CREATININE 1.1  --  1.2  --  1.0  --   --    CALCIUM 9.6  --  9.4  --  9.0  --   --     < > = values in this interval not displayed. No results for input(s): AST, ALT, BILIDIR, BILITOT, ALKPHOS in the last 72 hours. No results for input(s): INR in the last 72 hours. Recent Labs     10/12/21  0612   CKTOTAL 744*       Urinalysis:      Lab Results   Component Value Date    NITRU Negative 10/09/2021    WBCUA 6-9 10/06/2021    BACTERIA Rare 10/06/2021    RBCUA 5-10 10/06/2021    BLOODU Negative 10/09/2021    SPECGRAV 1.025 10/09/2021    GLUCOSEU Negative 10/09/2021       Radiology:  MRI BRAIN W WO CONTRAST   Final Result      1. Large, acute right MCA distribution infarct. This results in marked mass effect and right to left midline shift as well as subfalcine herniation intraventricular compression.  Small amount of acute intracranial hemorrhage posteriorly involving the    occipital/parietal lobe which likely reflects a combination of subarachnoid and parenchymal hemorrhage. 2. Occlusion of the proximal M1 segment of the right middle cerebral artery. 3. Background of white matter disease, consistent with chronic small vessel ischemic change. Assessment/Plan:    Active Hospital Problems    Diagnosis     Acute CVA (cerebrovascular accident) ([facility] [address] [I63.9]      Large right middle cerebral artery ischemic CVA  Subarachnoid hemorrhage and intraparenchymal hemorrhagic conversion  Acute encephalopathy  Vasogenic edema with mass-effect  Concern for nonconvulsive status epilepticus  -Neurology, Neurosurgery following  -Aspirin Lovenox restarted  -Continue Lipitor  -MRI of the brain revealed increased mass-effect with right-to-left midline shift as well as subfalcine herniation with intraventricular compression  -Continue hypertonic saline as per neurosurgery recommendations  -Continue every hour neurochecks    Hypertension  -Continue Norvasc, Coreg on hold    DVT Prophylaxis: SCD  Diet: Diet NPO  Code Status: Full Code    PT/OT Eval Status: Pending    Dispo -discussed with son at bedside.   If no significant improvement going forward, they will make a decision regarding PEG tube versus hospice    Rianna Gupta MD

## 2021-10-13 NOTE — PROGRESS NOTES
Component Value Ref Range & Units Status Collected Lab   Sodium 160High   136 - 145 mmol/L Final 10/13/2021  4:00 PM  - [address] [place] [address], NP with Neurology Team, updated on patient Sodium results, see above with new orders received to stop sodium chloride 3 % solution infusion at this time. Will continue to keep updated.

## 2021-10-13 NOTE — PROGRESS NOTES
Physical Therapy    Facility/Department: [facility]  Initial Assessment    NAME: Sukh Minor  : 1960  MRN: [id number]    Date of Service: 10/13/2021    Discharge Recommendations:Kike Panchal scored a 6/24 on the AM-PAC short mobility form. Current research shows that an AM-PAC score of 17 or less is typically not associated with a discharge to the patient's home setting. Based on the patient's AM-PAC score and their current functional mobility deficits, it is recommended that the patient have 3-5 sessions per week of Physical Therapy at d/c to increase the patient's independence. Please see assessment section for further patient specific details. If patient discharges prior to next session this note will serve as a discharge summary. Please see below for the latest assessment towards goals. PT Equipment Recommendations  Other: defer to patient's facility    Assessment   Body structures, Functions, Activity limitations: Decreased functional mobility ; Decreased strength;Decreased endurance;Decreased vision/visual deficit; Decreased coordination;Decreased posture;Decreased ADL status; Decreased safe awareness;Decreased high-level IADLs;Decreased cognition;Decreased balance;Decreased fine motor control  Assessment: Pt is a 63 yo female who has been gradually worsening since arriving to the hospital with her functional status. Pt unable to follow commands at this time and requires significant assistance for all mobility. Pt would benefit from continued therapy to increase her independence with mobility. Treatment Diagnosis: Decreased independence with functional mobility  Prognosis: Fair;Guarded  Decision Making: High Complexity  Barriers to Learning: Cognition  REQUIRES PT FOLLOW UP: Yes  Activity Tolerance  Activity Tolerance: Patient limited by fatigue;Patient limited by endurance; Patient limited by cognitive status  Activity Tolerance: Pt very lethargic with difficulty maintaining eyes open. Patient Diagnosis(es): The primary encounter diagnosis was Brain herniation ([facility] [address] Diagnoses of Acute CVA (cerebrovascular accident) [facility]) and Hypertensive emergency were also pertinent to this visit. has a past medical history of GERD (gastroesophageal reflux disease), HLD (hyperlipidemia), Hypertension, and Stroke ([facility] [address] has a past surgical history that includes Hysterectomy and Cholecystectomy. Restrictions  Restrictions/Precautions  Restrictions/Precautions: General Precautions, Fall Risk, Up as Tolerated, Seizure  Position Activity Restriction  Other position/activity restrictions: HOB elevated to 30*  Vision/Hearing        Subjective  General  Chart Reviewed: Yes  Additional Pertinent Hx: Per the H&P: Pt is a 64year old Female with a PmHx of HTN, HLD, GERD, obesity, tobacco use who presented with one day of left-sided vision changes, fine motor deficits (dropping objects) in the L hand, and LUE paresthesia. Transferred for worsening lethargy and disorientation. She was previously admitted for eye vision changes and headache. Hx of reporting stroke like symptoms in March for which she got an MRI. CT on 10/2 admission showed a large R MCA occlusion and Sulcal effacement throughout the right cerebral hemisphere with partial effacement of the right lateral and 3rd ventricles and minimal right left midline shift measuring 2 mm. Evidence of chronic infarcts. She was not a tPA or thrombectomy candidate. Head CT on 10/11 showed a large infarct on the right, with increased midline shift to the left. Elevated WBC count, with negative chest XR and urinalysis Concern at [facility] for non-convulsive SE given patient's reported deteriorating mental status. Transferred to [facility] for consideration of 3% saline infusion and/or continuous EEG.   Family / Caregiver Present: No  Referring Practitioner: Dr. Vel Sigala: Impaired (No command following noted)  General Comment  Comments: Pt was supine in bed upon arrival. Pt with R UE restraint. Subjective  Subjective: No functional statement made. Pt with unintelligible speech. Pain Screening  Patient Currently in Pain:  (Unable to assess 2/2 cognition. No facial grimaces or outward signs of pain noted during mobility.)  Vital Signs  Patient Currently in Pain:  (Unable to assess 2/2 cognition. No facial grimaces or outward signs of pain noted during mobility.)       Orientation  Orientation  Orientation Level: Disoriented X4  Social/Functional History  Social/Functional History  Lives With: Family (brother)  Type of Home: House  Home Layout: One level  Home Access: Stairs to enter with rails  Entrance Stairs - Number of Steps: 3  Entrance Stairs - Rails: Both  Bathroom Shower/Tub: Tub/Shower unit  Bathroom Toilet: Handicap height  Bathroom Equipment: Grab bars around toilet  Home Equipment: Cane, Standard walker, Reacher  ADL Assistance: Independent  Homemaking Assistance: Independent  Homemaking Responsibilities: Yes  Meal Prep Responsibility: Primary  Laundry Responsibility: Primary  Cleaning Responsibility: Primary  Shopping Responsibility: Primary  Ambulation Assistance: Independent (no AD)  Transfer Assistance: Independent  Active : Yes  Mode of Transportation: Car  Occupation: On disability  Type of occupation:  in [id number]: [employer], [place]  Additional Comments: All of the PLOF was taken from the chart 2/2 pt unable to provide any information.   Cognition        Objective                Tone RLE  Tone Description: Difficult to assess between increased tone or resisting  Tone LLE  LLE Tone: Hypertonic  Motor Control  Gross Motor?:  (Unable to assess 2/2 no command following)  Sensation  Overall Sensation Status:  (Unable to assess 2/2 cognition and poor command following)  Bed mobility  Supine to Sit: Dependent/Total;2 Person assistance (Assist x 2)  Sit to Supine: Dependent/Total;2 Person assistance (Assist x 2)  Scooting: Dependent/Total           Balance  Comments: Pt sat at the EOB for ~10 minutes requiring MOD-MAX A to maintain upright posture. Pt with a strong \"push\" to the L that she was intermittently able to correct when OT would alter placement of pt's R UE. Pt frequently going  in to extensor tone patterns on the R and during one episode started sliding fwd off the EOB. Pt was then dependently (x2) assisted back to supine. Plan   Plan  Times per week: 2-5x/wk  Current Treatment Recommendations: Strengthening, Functional Mobility Training, Equipment Evaluation, Education, & procurement, ROM, Transfer Training, Gait Training, Safety Education & Training, Balance Training, ADL/Self-care Training, Endurance Training, Positioning, Patient/Caregiver Education & Training, Stair training  Safety Devices  Type of devices: All fall risk precautions in place, Call light within reach, Patient at risk for falls, Left in bed, Nurse notified, Bed alarm in place, Sitter present  Restraints  Initially in place: Yes  Restraints: R UE    G-Code       OutComes Score                                                  AM-PAC Score  AM-PAC Inpatient Mobility Raw Score : 6 (10/12/21 1308)  AM-PAC Inpatient T-Scale Score : 23.55 (10/12/21 1308)  Mobility Inpatient CMS 0-100% Score: 100 (10/12/21 1308)  Mobility Inpatient CMS G-Code Modifier : CN (10/12/21 1308)          Goals  Short term goals  Time Frame for Short term goals: by d/c  Short term goal 1: Pt will complete bed mobility with MIN A  Short term goal 2: Pt will transfer with <> stand with MAX A  Short term goal 3: Pt will transfer bed to chair via stand pivot with Total (Mod of 1 and Min of 1) A.   Patient Goals   Patient goals : none stated       Therapy Time   Individual Concurrent Group Co-treatment   Time In 1137         Time Out 1205         Minutes [address] [facility]

## 2021-10-13 NOTE — PROGRESS NOTES
Physical Therapy  Facility/Department: [address] [place] [address] ICU  Daily Treatment Note  NAME: Sandhya Camarena  : 1960  MRN: [id number]    Date of Service: 10/13/2021    Discharge Recommendations: Sandhya Camarena scored a 6/24 on the AM-PAC short mobility form. Current research shows that an AM-PAC score of 17 or less is typically not associated with a discharge to the patient's home setting. Based on the patient's AM-PAC score and their current functional mobility deficits, it is recommended that the patient have 3-5 sessions per week of Physical Therapy at d/c to increase the patient's independence. Please see assessment section for further patient specific details. If patient discharges prior to next session this note will serve as a discharge summary. Please see below for the latest assessment towards goals. PT Equipment Recommendations  Equipment Needed: No  Other: defer to patient's facility    Assessment   Assessment: Pt continues to present with poor cognition and arousal,  unable to follow commands at this time and requires significant assistance for all mobility. Pt would benefit from continued therapy to increase her independence with mobility. Treatment Diagnosis: Decreased independence with functional mobility  Specific instructions for Next Treatment: progress mobility as tolerated  Prognosis: Fair;Guarded  Decision Making: High Complexity  PT Education: Goals;PT Role  Barriers to Learning: Cognition  REQUIRES PT FOLLOW UP: Yes  Activity Tolerance  Activity Tolerance: Patient limited by fatigue;Patient limited by endurance; Patient limited by cognitive status  Activity Tolerance: Pt very lethargic with difficulty maintaining eyes open. Patient Diagnosis(es): The primary encounter diagnosis was Brain herniation ([facility] [address] Diagnoses of Acute CVA (cerebrovascular accident) [facility]) and Hypertensive emergency were also pertinent to this visit.      has a past medical history of GERD (gastroesophageal reflux disease), HLD (hyperlipidemia), Hypertension, and Stroke ([facility] [address] has a past surgical history that includes Hysterectomy and Cholecystectomy. Restrictions  Restrictions/Precautions  Restrictions/Precautions: General Precautions, Fall Risk, Up as Tolerated, Seizure  Position Activity Restriction  Other position/activity restrictions: HOB elevated to 30*  Subjective   General  Chart Reviewed: Yes  Additional Pertinent Hx: Per the H&P: Pt is a 64year old Female with a PmHx of HTN, HLD, GERD, obesity, tobacco use who presented with one day of left-sided vision changes, fine motor deficits (dropping objects) in the L hand, and LUE paresthesia. Transferred for worsening lethargy and disorientation. She was previously admitted for eye vision changes and headache. Hx of reporting stroke like symptoms in March for which she got an MRI. CT on 10/2 admission showed a large R MCA occlusion and Sulcal effacement throughout the right cerebral hemisphere with partial effacement of the right lateral and 3rd ventricles and minimal right left midline shift measuring 2 mm. Evidence of chronic infarcts. She was not a tPA or thrombectomy candidate. Head CT on 10/11 showed a large infarct on the right, with increased midline shift to the left. Elevated WBC count, with negative chest XR and urinalysis Concern at [facility] for non-convulsive SE given patient's reported deteriorating mental status. Transferred to [facility] for consideration of 3% saline infusion and/or continuous EEG. Response To Previous Treatment: Patient with no complaints from previous session. Family / Caregiver Present: Yes (son)  Referring Practitioner: Dr. Lizzie Beavers  Subjective  Subjective: No functional statement made. Pt with unintelligible speech. General Comment  Comments: Pt was supine in bed upon arrival. Pt with R UE restraint.   Pain Screening  Patient Currently in Pain: Other (comment) (unable to assess)  Vital Signs  Patient Currently in Pain: Other (comment) (unable to assess)       Orientation     Cognition   Cognition  Overall Cognitive Status: Exceptions  Arousal/Alertness: Unresponsive to stimuli  Following Commands: Does not follow commands  Attention Span: Unable to maintain attention  Safety Judgement: Decreased awareness of need for safety;Decreased awareness of need for assistance  Problem Solving: Decreased awareness of errors  Insights: Not aware of deficits  Initiation: Requires cues for all  Sequencing: Requires cues for all  Cognition Comment: Pt was not responsive to any commands during session and kept her eyes closed almost the entire time. Pt however was very active and was moving her RLE excessively throughout session. Objective   Bed mobility  Supine to Sit: 2 Person assistance;Dependent/Total (max A x2)  Sit to Supine: 2 Person assistance;Dependent/Total (max A x2)  Comment: Pt sat EOB for ~7 mins with Max Ax2 to maintain trunk control. Pt was significantly pushing to the L with her RUE and required constant re-direction to place R UE on her lap. Pt was very activity with her RLE and was seen trying to pull her RLE behind resulting in LOB at EOB. Pt became more unsafe at EOB due to increased activity and unresponsiveness to safety cues. Pt was too active at EOB to tolerate seated balance for extended time and was assisted back to bed. Once positioned back in bed pt was very active and sliding down in the bed. Pt was rolled onto her R side and RN was present at EOS. Transfers  Sit to Stand: Unable to assess (due to poor balance, impulsivenss)  Ambulation  Ambulation?: No     Balance  Posture: Poor  Sitting - Static: Poor  Sitting - Dynamic: Poor  Comments: Pt sat at the EOB for ~7 minutes requiring MAX Ax2 to maintain upright posture. Pt with a strong \"push\" to the L and kicking with R LE. Pt was then dependently (x2) assisted back to supine.                            G-Code     OutComes Score

## 2021-10-13 NOTE — PROGRESS NOTES
Occupational Therapy  Facility/Department: [facility] ICU  Daily Treatment Note  NAME: Suresh Tran  : 1960  MRN: [id number]    Date of Service: 10/13/2021    Discharge Recommendations: Suresh Tran scored a 6/24 on the AM-PAC ADL Inpatient form. Current research shows that an AM-PAC score of 17 or less is typically not associated with a discharge to the patient's home setting. Based on the patient's AM-PAC score and their current ADL deficits, it is recommended that the patient have 3-5 sessions per week of Occupational Therapy at d/c to increase the patient's independence. Please see assessment section for further patient specific details. If patient discharges prior to next session this note will serve as a discharge summary. Please see below for the latest assessment towards goals. OT Equipment Recommendations  Other: defer to d/c location    Assessment   Performance deficits / Impairments: Decreased functional mobility ; Decreased safe awareness;Decreased balance;Decreased vision/visual deficit; Decreased ADL status; Decreased ROM; Decreased strength;Decreased coordination;Decreased high-level IADLs;Decreased cognition;Decreased fine motor control;Decreased endurance;Decreased sensation;Decreased posture  Assessment: Pt tolerated seated EOB today for about 7 mins requiring up to max Ax2 to maintain trunk control. Pt was pushing significantly to the L and she became very active moving her RLE and RUE however pt was not responsive to cues for safety to promote improved positioning. Pt kept her eyes closed throughout session and was not able to successfully follow commands. Pt is unsafe for d/c home at this time and pt benefits from continued OT to maximize functional independence.   Treatment Diagnosis: Decreased ADL status and decreased balance 2/2 CVA    OT Education: OT Role;Precautions;Transfer Training  Patient Education: no carry of learning anticipated- reinforce as able  REQUIRES OT FOLLOW UP: Yes  Activity Tolerance  Activity Tolerance: Treatment limited secondary to decreased cognition  Activity Tolerance: Pt unresponsive to stimuli  Safety Devices  Safety Devices in place: Yes  Type of devices: Bed alarm in place; Left in bed;Call light within reach;Nurse notified  Restraints  Initially in place: Yes  Restraints: RUE soft wrist restraint reapplied         Patient Diagnosis(es): The primary encounter diagnosis was Brain herniation ([facility] [address] Diagnoses of Acute CVA (cerebrovascular accident) [facility]) and Hypertensive emergency were also pertinent to this visit. has a past medical history of GERD (gastroesophageal reflux disease), HLD (hyperlipidemia), Hypertension, and Stroke ([facility] [address] has a past surgical history that includes Hysterectomy and Cholecystectomy. Restrictions  Restrictions/Precautions  Restrictions/Precautions: General Precautions, Fall Risk, Up as Tolerated, Seizure  Position Activity Restriction  Other position/activity restrictions: HOB elevated to 30*  Subjective   General  Chart Reviewed: Yes  Patient assessed for rehabilitation services?: Yes  Additional Pertinent Hx: Sandhya Camarena is a 64 y.o. female with PHx sig for HTN, HLD, GERD, obesity, tobacco abuse who initially presented to [facility] on 10/2/2021 with left sided field cut and left hand clumsiness. Her initial head CT showed a right MCA stroke. Her CTA showed occlusion of the right MCA at the origin  Family / Caregiver Present: Yes (son)  Referring Practitioner: Dr. Army Shipley  Diagnosis: CVA with Left side hemiparesis, Left visual neglect  Subjective  Subjective: Pt was semi supine in bed upon arrival w/ son present. Pt was sliding down in the bed and very active moving her RLE. Pt's eyes were closed and she was not responding to stimuli.     Vital Signs  Patient Currently in Pain: Other (comment) (unable to assess)     Orientation  Orientation  Orientation Level: Unable to assess (pt unable to respond appropriately)    Objective ADL  LE Dressing: Dependent/Total (Total A to don socks)          Balance  Sitting Balance: Dependent/Total (Seated EOB for ~7 mins w/ Max Ax2)    Bed mobility  Supine to Sit: 2 Person assistance;Dependent/Total (Max Ax2)  Sit to Supine: 2 Person assistance;Dependent/Total (Max Ax2)  Comment: Pt sat at EOB for ~7 mins w/ Max Ax2 to maintain trunk control. Pt was significantly pushing to the L w/ her RUE and required constant re-direction to place RUE on her lap. Pt was very active w/ her RLE and was seen trying to pull her RLE behind her resulting in LOB at EOB. Pt became more unsafe at EOB due to increased activity and unresponsiveness to safety cues. Pt was too active at EOB to tolerate seated balance for extended time and was assisted back to bed. Once positioned back in bed pt was very active and sliding down in the bed. Pt was rolled onto her R side and RN was present at EOS. Cognition  Arousal/Alertness: Unresponsive to stimuli  Following Commands: Does not follow commands  Attention Span: Unable to maintain attention  Safety Judgement: Decreased awareness of need for safety;Decreased awareness of need for assistance  Problem Solving: Decreased awareness of errors  Insights: Not aware of deficits  Initiation: Requires cues for all  Sequencing: Requires cues for all  Cognition Comment: Pt was not responsive to any commands during session and kept her eyes closed almost the entire time. Pt however was very active and was moving her RLE excessively throughout session.                                            Plan   Plan  Times per week: 2-5x  Times per day: Daily  Current Treatment Recommendations: Strengthening, ROM, Balance Training, Functional Mobility Training, Safety Education & Training, Positioning, Self-Care / ADL, Neuromuscular Re-education, Cognitive/Perceptual Training, Patient/Caregiver Education & Training  G-Code     OutComes Score AM-PAC Score        AM-PAC Inpatient Daily Activity Raw Score: 6 (10/13/21 1443)  AM-PAC Inpatient ADL T-Scale Score : 17.07 (10/13/21 1443)  ADL Inpatient CMS 0-100% Score: 100 (10/13/21 1443)  ADL Inpatient CMS G-Code Modifier : CN (10/13/21 1443)    Goals  Short term goals  Time Frame for Short term goals: By D/C  Short term goal 1: Pt will tolerate seated EOB for 10 mins w/ mod A-ongoing  Short term goal 2: Pt will participate in bed to chair transfer-ongoing  Short term goal 3: Pt will keep eyes open ~50% of the session w/ VCs-ongoing       Therapy Time   Individual Concurrent Group Co-treatment   Time In 1400         Time Out 1426         Minutes 26         Timed Code Treatment Minutes: 26 Minutes       Rohit Noel, OT

## 2021-10-13 NOTE — PROGRESS NOTES
ICU Progress Note    Admit Date: 10/11/2021  Day: 3  Vent Day: None  IV Access:Peripheral, Fem line  IV Fluids: 3% NS  Vasopressors:None                Antibiotics: None  Diet: Diet NPO    CC: LUE weakness, AMS    Interval history:  NAEO  Patient has been hypertensive with -200, requiring PRNs  Patient continues to be restless, but is more responsive this morning. Responded to her name  This morning patient is restless, oddly positioned in bed (feet dangling). On 3% NS wean    HPI:  Sergo Luu is a 64year old Female with a PmHx of HTN, HLD, GERD, obesity, tobacco use who presented with one day of left-sided vision changes, fine motor deficits (dropping objects) in the L hand, and LUE paresthesia. Transferred for worsening lethargy and disorientation.     She was previously admitted for eye vision changes and headache. Hx of reporting stroke like symptoms in March for which she got an MRI. CT on 10/2 admission showed a large R MCA occlusion and Sulcal effacement throughout the right cerebral hemisphere with partial effacement of the right lateral and 3rd ventricles and minimal right left midline shift measuring 2 mm. Evidence of chronic infarcts. She was not a tPA or thrombectomy candidate. Head CT on 10/11 showed a large infarct on the right, with increased midline shift to the left. Elevated WBC count, with negative chest XR and urinalysis     Concern at [facility] for non-convulsive SE given patient's reported deteriorating mental status. Transferred to [place] [id number] for consideration of 3% saline infusion and/or continuous EEG.     Medications:     Scheduled Meds:   sodium chloride flush  5-40 mL IntraVENous 2 times per day    [Held by provider] enoxaparin  40 mg SubCUTAneous Daily    famotidine (PEPCID) injection  20 mg IntraVENous Daily    amLODIPine  10 mg Oral Daily    [Held by provider] carvedilol  12.5 mg Oral BID WC    atorvastatin  80 mg Oral Nightly    [Held by provider] aspirin  81 mg Oral Daily  insulin lispro  0-6 Units SubCUTAneous TID     insulin lispro  0-3 Units SubCUTAneous Nightly     Continuous Infusions:   sodium chloride      dextrose       PRN Meds:labetalol, hydrALAZINE, sodium chloride flush, sodium chloride, ondansetron **OR** ondansetron, polyethylene glycol, acetaminophen **OR** acetaminophen, glucose, dextrose, glucagon (rDNA), dextrose    Objective:   Vitals:   T-max:  Patient Vitals for the past 8 hrs:   BP Temp Temp src Pulse Resp SpO2   10/13/21 0500 (!) 156/87 -- -- 88 16 100 %   10/13/21 0400 (!) 165/83 -- -- 104 21 100 %   10/13/21 0300 (!) 196/85 -- -- 105 20 99 %   10/13/21 0200 111/68 -- -- 107 21 100 %   10/13/21 0100 (!) 190/88 -- -- 101 17 100 %   10/13/21 0000 (!) 170/79 99.7 °F (37.6 °C) Oral 99 17 98 %   10/12/21 2300 (!) 152/72 -- -- 95 15 98 %       Intake/Output Summary (Last 24 hours) at 10/13/2021 0646  Last data filed at 10/13/2021 0000  Gross per 24 hour   Intake --   Output 300 ml   Net -300 ml       Review of Systems   Unable to perform ROS: Mental status change   Denied pain. Physical Exam  Constitutional:       Appearance: She is ill-appearing.      Comments: Patient is lethargic, responsive to name and pain today   HENT:      Head: Normocephalic.      Right Ear: External ear normal.      Left Ear: External ear normal.      Nose: Nose normal.   Eyes:      General:         Right eye: No discharge.         Left eye: No discharge.      Extraocular Movements: Extraocular movements intact.      Conjunctiva/sclera: Conjunctivae normal.      Pupils: Pupils are equal, round, and reactive to light. Cardiovascular:      Rate and Rhythm: Normal rate and regular rhythm.      Pulses: Normal pulses.      Heart sounds: Normal heart sounds. No murmur heard. No gallop.    Pulmonary:      Effort: Pulmonary effort is normal. No respiratory distress.      Breath sounds: Normal breath sounds.  No wheezing.      Comments: Increased upper respiratory noise and not following commands to clear throat. Wheezing. Abdominal:      General: Bowel sounds are normal. There is no distension.      Palpations: Abdomen is soft.      Tenderness: There is no abdominal tenderness. There is no guarding or rebound. Musculoskeletal:         General: No swelling.      Right lower leg: No edema.      Left lower leg: No edema. Skin:     General: Skin is warm and dry.      Coloration: Skin is not jaundiced.      Findings: Rash (Below R clavicle) present. No bruising. Neurological:      Mental Status: She is disoriented.      Motor: Weakness present.      Comments: Patient is able to spontaneously move all extremities except LUE which is flaccid. Patient responded to her name but not intelligibly. Withdraws extremities to pain. Lethargic and does not follow commands. LABS:    CBC:   Recent Labs     10/11/21  0616 10/12/21  0611 10/13/21  0426   WBC 18.2* 19.4* 13.8*   HGB 9.7* 9.1* 8.2*   HCT 28.0* 26.9* 24.6*    366 373   MCV 85.4 87.2 89.2     Renal:    Recent Labs     10/11/21  0616 10/11/21  0616 10/12/21  0611 10/12/21  0800 10/13/21  0024 10/13/21  0229 10/13/21  0426   *   < > 137   < > 152* 153* 157*   K 3.7  --  3.9  --   --   --  4.0   CL 97*  --  100  --   --   --  124*   CO2 23  --  22  --   --   --  21   BUN 45*  --  52*  --   --   --  34*   CREATININE 1.1  --  1.2  --   --   --  1.0   GLUCOSE 134*  --  132*  --   --   --  122*   CALCIUM 9.6  --  9.4  --   --   --  9.0   ANIONGAP 13  --  15  --   --   --  12    < > = values in this interval not displayed. Hepatic: No results for input(s): AST, ALT, BILITOT, BILIDIR, PROT, LABALBU, ALKPHOS in the last 72 hours. Troponin: No results for input(s): TROPONINI in the last 72 hours. BNP: No results for input(s): BNP in the last 72 hours. Lipids: No results for input(s): CHOL, HDL in the last 72 hours.     Invalid input(s): LDLCALCU, TRIGLYCERIDE  ABGs:  No results for input(s): PHART, ZSG4WUY, PO2ART, ZUI0FLU, BEART, THGBART, U0TRUGMX, BIP7DPB in the last 72 hours. INR: No results for input(s): INR in the last 72 hours. Lactate: No results for input(s): LACTATE in the last 72 hours. Cultures:  -----------------------------------------------------------------  RAD:   MRI BRAIN W WO CONTRAST   Final Result      1. Large, acute right MCA distribution infarct. This results in marked mass effect and right to left midline shift as well as subfalcine herniation intraventricular compression. Small amount of acute intracranial hemorrhage posteriorly involving the    occipital/parietal lobe which likely reflects a combination of subarachnoid and parenchymal hemorrhage. 2. Occlusion of the proximal M1 segment of the right middle cerebral artery. 3. Background of white matter disease, consistent with chronic small vessel ischemic change. Assessment/Plan:     Noelle De La Torre is a 64year old Female with a PmHx of HTN, HLD, GERD, obesity, tobacco use who presented with one day of left-sided vision changes, fine motor deficits (dropping objects) in the L hand, and LUE paresthesia. Transferred for worsening lethargy and disorientation.     Suspected non-convulsive SE  Transferred to [facility] for potential cEEG and/or saline 3%. No observed clonus. Patient has become increasingly lethargic, could be post-ictal.  - Neurology consulted, recs appreciated  - q1h neuro checks  - Too restless for EEG yesterday, may require dose of Ativan prior to EEG     Large RMCA subacute ischemic stroke  SAH and intraparenchymal hemorrhage conversion  Likely thromboembolic. Her CTA showed an occlusion of the origin of the RMCA, also significant stenoses of the LMCA. ECHO was normal. Uncontrolled HTN could contribute. Patient is more than a week out from stroke onset so pt is unlikely to be a neurosurgery candidate.  Worsening mass effect and development of SAH and intraparenchymal hemorrhage  - restart aspirin and Lovenox per neuro  - statin  - PT/OT  - MRI head showed increased mass effect and right to left midline shift as well as sub falcine herniation with intraventricular compression. Small SAH and intraparenchymal hemorrhage.  - Neurosurgery consulted, appreciate recs: started 3% NS wean since sodium overcorrected goal of 145-155 (at 158). Can be transferred after wean (tomorrow)  - Elevated HOB  - Few interventions are available given the age of her stroke     HTN, poorly controlled  Outside hospital resumed home amlodipine and carvedilol but patient has had fluctuating highs and lows of BP. Orthostatic hypotension has resulted in 2 syncopal episodes while inpatient. She was given chlorthalidone, but then her Na decreased, BUN increased, and she had a syncopal spell, so this was stopped overnight.   - continue home amlodipine and carvedilol with hold parameters when able to take PO  - labetalol and hydralazine PRN     Syncopal episodes on 10/5 and 10/6  Likely vasovagal since both episodes occurred on the toilet. Could also be 2/2 orthostatic hypotension. No tele events documented. No seizure activity documented during her syncopal spells. ECHO normal.  - continuous tele  - no syncopal episodes while admitted     Leukocytosis  Likely 2/2 stroke. Concern for infection, CXR clear and UA repeatedly negative. - Continue to follow  - Blood cultures and urine cultures pending     Delirium  AMS likely 2/2 stroke and delirium vs. Post-ictal in SE.  Has required doses of Ativan for procedures.  - Supportive care  - Delirium precautions     HLD  - Lipitor 80 mg daily     Obesity  Encouraged diet and exercise as able.     Prediabetes   A1c 5.7  - LDSSI  - Hypoglycemia protocol, POCT checks    Code Status: FULL  FEN: NPO  PPX: SCD, Pepcid  DISPO: ICU     Morgan Calzada MD, PGY-1  10/13/21  6:46 AM    This patient has been staffed and discussed with Dr. Remy Hays    Patient seen, examined and discussed with the resident and I agree with the assessment and plan.    Remains encephalopathic and aphasic  Remains on hypertonic saline infusion with sodium slightly over corrected. On q2 hr neuro checks. Patient son at bedside and provided some additional history. I explained the severity of his mother's large infarction and swelling. Was unclear to me as why patient appears to be aphasic, however neurology suspects that she may be delirious as opposed to aphasic. Hopefully that is the case as that would be reversible. I explained to the son that his mother is likely to have severe neurologic deficits moving forward as result of this stroke. He and his siblings are going to discuss CODE STATUS and goals of care and then get back to us with any changes.     Levon Garcia MD

## 2021-10-13 NOTE — PROGRESS NOTES
Speech Language Pathology  Facility/Department: [facility] ICU  Dysphagia Daily Treatment Note    NAME: Rut Brody  : 1960  MRN: [id number]    Patient Diagnosis(es):   Patient Active Problem List    Diagnosis Date Noted    [facility]cerebrovascular [facility] 10/11/2021    Peripheral visual field defect, left     Acute ischemic stroke ([facility] 10/02/2021    SOB (shortness of breath) 2021    Dizziness 2021    GERD (gastroesophageal reflux disease) 2021    Lumbar spondylosis 2021    Tobacco use disorder 2021    HTN (hypertension), benign     Dyslipidemia     CVA [facility]cerebral vascular accident) ([facility] 2021    Hypertensive emergency 2021    Hypomagnesemia 2021    Syncope 2021    Smoker 2021    Hypokalemia 2021    Thyroid nodule 2021    Hypertensive urgency 2021    Left lumbar radiculitis 2014    Myofascial muscle pain 2014     Allergies: Allergies   Allergen Reactions    Bee Pollen Swelling    Lisinopril Nausea Only    Varenicline Nausea And Vomiting       CXR (date)- 10/11/2021  Impression   No acute cardiac or pulmonary disease.         Previous MBS : no prior MBS    Chart reviewed. Medical Diagnosis: Acute CVA  Treatment Diagnosis: Oropharyngeal Dysphagia     BSE Impression (10/13/2021)- Dysphagia Diagnosis: Suspected needs further assessment;Severe pharyngeal stage dysphagia; Severe oral stage dysphagia  Dysphagia Impression : Patient presents with suspected severe oropharyngeal dysphagia in the setting of altered mental status, lethargy, post acute CVA. Completed oral hygiene with suction kit; pt with inconsistent inappropriate biting on suction despite cues. Assessed tolerance ice chips; pt with limited oral acceptance, anterior labial loss of bolus, deficient oral prep, no clear evidence of swallow initiation/laryngeal movement. Pt remains a high aspiration/choking risk at this time.  Recommend continue strict NPO, oral hygiene w/ suction kit as able, consider small amounts ice chips when sufficiently alert. Will continue to follow. Dysphagia Outcome Severity Scale: Level 1: Severe dysphagia- NPO. Unable to tolerate any PO safely     MBS results- No prior MBS     Pain: None indicated     Current Diet : NPO   ,   Treatment:  Pt seen bedside to address the following goals:  Goal 1: Patient will participate in ongoing assessment of swallow function as appropriate. 10/13: Pt lying in bed upon entry. Eyes closed with no verbalizations throughout session but demonstrated alertness with physical movements and oral restriction. Oral care provided with suction toothette system and significant assistance for mouth opening secondary to oral defensiveness. Pt administered small ice chips, required max cuing to open mouth to receive. Once received, no swallow initiated despite max cues. No s/s of aspiration noted despite absent swallow response. Recommend continuing NPO diet and oral hygiene with suction kit as able. Continue goal.     Patient/Family/Caregiver Education:  Pt educated to role of SLP. No family present. Nurse educated to results of evaluation and recommendations. Pt not demonstrating ability to comprehend information at this time. Compensatory Strategies:  Oral care via suction kit 3-5x/day     Plan:  Continued daily Dysphagia treatment with goals per  plan of care. Diet recommendations: NPO  DC recommendation: Ongoing dysphagia therapy indicated. Treatment: 15  D/W nursing, Titus before and after session. Needs met prior to leaving room, call button in reach. Char Angela  Speech Language Pathology Graduate Clinician    Kasia Rain M.A., [place]  Speech-Language Pathologist  Pg.  # [phone]    If patient is discharged prior to next treatment, this note will serve as the discharge summary

## 2021-10-13 NOTE — PROGRESS NOTES
Palliative Care Chart Review  and Check in Note:     NAME:  Jacklynn Hashimoto  Admit Date: 10/11/2021  Hospital Day:  Hospital Day: 3   Current Code status: Full Code    Palliative care is continuing to following Ms. Esther Sanchez for symptom management,  and goals of care discussion as needed. Patient's chart reviewed today 10/13/21. Spoke with pt's son son Nadine Mora and daughter Nohemi Howe over the phone. Nohemi Howe wants to defer decision making to Nadine Mora. Pt's son, Saray Saini, is her legal NOK and decision maker. Tiago plans to visit the pt around [phone] today. Addendum: Met with pt's son Nadine Mora at the bedside. He reports that he discussed possibility of PEG tube with the doctor. He does not think the pt would want that, but wants to discuss with her sister prior to making a decision. We did discuss code status in depth today, which he will also discuss with the pt's sister. We briefly discussed hospice. Provided emotional support and agreed to follow up tomorrow. The following are the currently established goals/code status, and Symptom management. Goals of care: Continue with current management. Noted concerns for swallowing, will discuss possibility of PEG tube with pt's son at the bedside today.      Code status: Full Code    Discharge plan: TBD pending hospital course, plan has been for [facility], APRN - CNP  10/13/21  8:39 AM

## 2021-10-14 NOTE — PROGRESS NOTES
Occupational Therapy  Facility/Department: [facility] ICU  Daily Treatment Note  NAME: Sulema Grove  : 1960  MRN: [id number]    Date of Service: 10/14/2021    Discharge Recommendations Sulema Grove scored a 6/24 on the AM-PAC ADL Inpatient form. Current research shows that an AM-PAC score of 17 or less is typically not associated with a discharge to the patient's home setting. Based on the patient's AM-PAC score and their current ADL deficits, it is recommended that the patient have 3-5 sessions per week of Occupational Therapy at d/c to increase the patient's independence. Please see assessment section for further patient specific details. If patient discharges prior to next session this note will serve as a discharge summary. Please see below for the latest assessment towards goals. OT Equipment Recommendations  Other: defer to d/c location    Assessment   Performance deficits / Impairments: Decreased functional mobility ; Decreased safe awareness;Decreased balance;Decreased vision/visual deficit; Decreased ADL status; Decreased ROM; Decreased strength;Decreased coordination;Decreased high-level IADLs;Decreased cognition;Decreased fine motor control;Decreased endurance;Decreased sensation;Decreased posture  Assessment: Pt w/ improved tolerance for therapy session today as evident by pt ability to maintain seated balance w/ CGA-Mod A for up to 16 mins. Pt still required assist x 2 for bed mobility w/ decreased initiation noted. Pt was slightly more alert today but continues to keep her eyes closed through most of sesion. Pt continues to benefit from ongoing OT to maximize functional independence.  Cont OT per POC  Treatment Diagnosis: Decreased ADL status and decreased balance 2/2 CVA  OT Education: OT Role;Precautions;Transfer Training  Patient Education: no carry of learning anticipated- reinforce as able  REQUIRES OT FOLLOW UP: Yes  Activity Tolerance  Activity Tolerance: Patient Tolerated treatment well  Safety Devices  Safety Devices in place: Yes  Type of devices: Bed alarm in place; Left in bed;Call light within reach;Nurse notified  Restraints  Initially in place: Yes  Restraints: RUE soft wrist restraint reapplied         Patient Diagnosis(es): The primary encounter diagnosis was Brain herniation ([address] Diagnoses of Acute CVA (cerebrovascular accident) [facility]) and Hypertensive emergency were also pertinent to this visit. has a past medical history of GERD (gastroesophageal reflux disease), HLD (hyperlipidemia), Hypertension, and Stroke ([address] has a past surgical history that includes Hysterectomy and Cholecystectomy. Restrictions  Restrictions/Precautions  Restrictions/Precautions: General Precautions, Fall Risk, Up as Tolerated, Seizure  Position Activity Restriction  Other position/activity restrictions: HOB elevated to 30*  Subjective   General  Chart Reviewed: Yes  Patient assessed for rehabilitation services?: Yes  Additional Pertinent Hx: Adrian Villegas is a 64 y.o. female with PHx sig for HTN, HLD, GERD, obesity, tobacco abuse who initially presented to [facility] on 10/2/2021 with left sided field cut and left hand clumsiness. Her initial head CT showed a right MCA stroke. Her CTA showed occlusion of the right MCA at the origin  Family / Caregiver Present: Yes (sister)  Referring Practitioner: Dr. Cierra Srinivasan  Diagnosis: CVA with Left side hemiparesis, Left visual neglect  Subjective  Subjective: Pt was supine in bed upon arrival. Pt was less active today and appeared to be opening her eyes more however pt still maintained eyes closed ~75% of session.  Pt minimally responsive to stimuli  Vital Signs  Patient Currently in Pain: Other (comment) (unable to assess)   Orientation  Orientation  Orientation Level: Unable to assess  Objective    ADL  Grooming: Dependent/Total (Hair care at EOB)  Additional Comments: Pt sat at EOB for 8 mins + 16 mins w/ CGA-Mod A while OT assisted w/ hair care due to severely matted hair. OT placed brush in pt's hand in attempt to see if pt would use object appropriately however pt did not even attempt to squeeze the brush in her hand. Pt demonstrated less active movement at EOB today and she was pushing less than yesterday. One rest break was required where pt was assisted back to supine position but then returned to EOB. Pt demonstrated significant tone in LLE and extensor tone in RUE. Pt w/ flexor tone in L elbow. No purposeful movement noted during session and pt was unable to track any objects. Balance  Sitting Balance: Moderate assistance (CGA-Mod A)  Bed mobility  Supine to Sit: Dependent/Total;2 Person assistance (Max x 2; No active movement from pt noted in assisting transfer)  Sit to Supine: 2 Person assistance;Dependent/Total (Assist x 2)  Scooting: Dependent/Total                          Cognition  Arousal/Alertness: Unresponsive to stimuli  Following Commands: Does not follow commands  Attention Span: Unable to maintain attention  Safety Judgement: Decreased awareness of need for safety;Decreased awareness of need for assistance  Problem Solving: Decreased awareness of errors  Insights: Not aware of deficits  Initiation: Requires cues for all  Sequencing: Requires cues for all  Cognition Comment: Pt was less active today and was safer seated at EOB. Pt w/ less pushing today and pt kept her eyes open slightly longer today than yesterday.                                          Plan   Plan  Times per week: 2-5x  Times per day: Daily  Current Treatment Recommendations: Strengthening, ROM, Balance Training, Functional Mobility Training, Safety Education & Training, Positioning, Self-Care / ADL, Neuromuscular Re-education, Cognitive/Perceptual Training, Patient/Caregiver Education & Training  G-Code     OutComes Score                                                  AM-PAC Score        AM-PAC Inpatient Daily Activity Raw Score: 6 (10/14/21 1117)  AM-PAC Inpatient ADL T-Scale Score : 17.07 (10/14/21 1117)  ADL Inpatient CMS 0-100% Score: 100 (10/14/21 1117)  ADL Inpatient CMS G-Code Modifier : CN (10/14/21 1117)    Goals  Short term goals  Time Frame for Short term goals: By D/C  Short term goal 1: Pt will tolerate seated EOB for 10 mins w/ mod A-goal met 10/14  Short term goal 2: Pt will participate in bed to chair transfer-ongoing  Short term goal 3: Pt will keep eyes open ~50% of the session w/ VCs-ongoing       Therapy Time   Individual Concurrent Group Co-treatment   Time In 1005         Time Out 1101         Minutes 56         Timed Code Treatment Minutes: [address], OT   If patient discharges prior to next treatment, this note will serve as discharge summary. WiIll continue per plan of care if patient does not discharge.

## 2021-10-14 NOTE — PROGRESS NOTES
Palliative Care Chart Review  and Check in Note:     NAME:  Josefa Vasquez  Admit Date: 10/11/2021  Hospital Day:  Hospital Day: 4   Current Code status: Full Code    Palliative care is continuing to following Ms. Bob Mcguire for symptom management,  and goals of care discussion as needed. Patient's chart reviewed today 10/14/21. Saw pt at the bedside, remains restless, moving her right side spontaneously. No family present. Called pt's son Mayela Fitzgerald, no answer, left  with callback number. Also called pt's sister [place] Cousins, no answer, unable to leave . D/w Neuro APRN Nickie Bruce. Addendum: spoke with pt's son Mayela Fitzgerald, discussed possibility of PEG tube vs hospice again. Explained that if they opted for PEG tube placement, could consider hospice in the future if she declines or does not have acceptable level of recovery. Mayela Fitzgerald will talk with his family tonight and will have a decision tomorrow. The following are the currently established goals/code status, and Symptom management. Goals of care: Continue with current management. Family considering whether pt would want PEG tube vs hospice. OK to place NGT in mean time.      Code status: Full Code, family discussing    Discharge plan: LENNY Blanco, APRN - CNP  10/14/21  3:37 PM

## 2021-10-14 NOTE — PROGRESS NOTES
and rhythm with normal S1/S2 without murmurs, rubs or gallops. Abdomen: Soft, non-tender, non-distended with normal bowel sounds. Musculoskeletal: No clubbing, cyanosis or edema bilaterally. Full range of motion without deformity. Skin: Skin color, texture, turgor normal.  No rashes or lesions. Neurologic: Right upper extremity power intact, no movement noted on the left upper extremity, withdraws to pain in both right lower and left lower extremity. Unable to fully assess as patient cannot follow commands  Psychiatric: Encephalopathic, agitated  Capillary Refill: Brisk,3 seconds, normal   Peripheral Pulses: +2 palpable, equal bilaterally       Labs:   Recent Labs     10/12/21  0611 10/13/21  0426 10/14/21  0450   WBC 19.4* 13.8* 16.3*   HGB 9.1* 8.2* 8.4*   HCT 26.9* 24.6* 25.2*    373 417     Recent Labs     10/12/21  0611 10/12/21  0800 10/13/21  0426 10/13/21  0612 10/14/21  0450 10/14/21  0832 10/14/21  1250      < > 157*   < > 164* 165* 164*   K 3.9  --  4.0  --  3.8  --   --      --  124*  --  129*  --   --    CO2 22  --  21  --  22  --   --    BUN 52*  --  34*  --  30*  --   --    CREATININE 1.2  --  1.0  --  1.0  --   --    CALCIUM 9.4  --  9.0  --  9.5  --   --     < > = values in this interval not displayed. No results for input(s): AST, ALT, BILIDIR, BILITOT, ALKPHOS in the last 72 hours. No results for input(s): INR in the last 72 hours. Recent Labs     10/12/21  0612   CKTOTAL 744*       Urinalysis:      Lab Results   Component Value Date    NITRU Negative 10/09/2021    WBCUA 6-9 10/06/2021    BACTERIA Rare 10/06/2021    RBCUA 5-10 10/06/2021    BLOODU Negative 10/09/2021    SPECGRAV 1.025 10/09/2021    GLUCOSEU Negative 10/09/2021       Radiology:  MRI BRAIN W WO CONTRAST   Final Result      1. Large, acute right MCA distribution infarct. This results in marked mass effect and right to left midline shift as well as subfalcine herniation intraventricular compression. Small amount of acute intracranial hemorrhage posteriorly involving the    occipital/parietal lobe which likely reflects a combination of subarachnoid and parenchymal hemorrhage. 2. Occlusion of the proximal M1 segment of the right middle cerebral artery. 3. Background of white matter disease, consistent with chronic small vessel ischemic change. 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 3% stopped.  Continue to monitor    Hypertension  -Continue Norvasc, Coreg     DVT Prophylaxis: Lovenox  Diet: Diet NPO  Code Status: Full Code    PT/OT Eval Status: Pending    Dispo -awaiting improvement in mental status    Nik White MD

## 2021-10-14 NOTE — CARE COORDINATION
Case Management Assessment           Daily Note                 Date/ Time of Note: 10/14/2021 11:36 AM         Note completed by: Pacheco Humphreys RN    Patient Name: Ricardo Navas  YOB: 1960    Diagnosis:Acute CVA (cerebrovascular accident) [facility]) [I63.9]  Patient Admission Status: Inpatient    Date of Admission:10/11/2021  6:35 PM Length of Stay: 3 GLOS: GMLOS: 5.1    Current Plan of Care: off 3% solution, remains in restraints  ________________________________________________________________________________________  PT AM-PAC: 6 / 24 per last evaluation on: 10/13    OT AM-PAC: 6 / 24 per last evaluation on: 10/13    DME Needs for discharge: TBD  ________________________________________________________________________________________  Discharge Plan: SNF: [facility]    Tentative discharge date: TBD    Current barriers to discharge: restraints    Referrals completed: SNF: [facility]    Resources/ information provided: Not indicated at this time  ________________________________________________________________________________________  Case Management Notes:Patient is from home but will need placement at d/c. A referral had been sent to [facility] previously and they are able to accept the patient with pending Medicaid. I spoke with her sister at bedside and this is their first choice for placement. Leti Henley and her family were provided with choice of provider; she and her family are in agreement with the discharge plan.     Care Transition Patient: Lashaun Humphreys RN  [facility]  Case Management Department  Ph: [phone]  Fax: [phone]

## 2021-10-14 NOTE — PROGRESS NOTES
Exam:  RUE: Strong throughout, does not follow commands  LUE: No movement to pain   RLE: Moving spontaneously, does not follow commands  LLE: withdraws to painful stim      Deep tendon reflexes:     R  L    Biceps  3+ 3+   Brachioradialis  3+ 3+   Patellar  3+ 3+   Toes down Up       Sensory: pain intact in all 4 extremities   Tone: increased in LUE           Images: All results below personally reviewed. Pertinent positives & negatives are addressed in Assessment & Plan section of note     MRI brain wo contrast, 10/11/2021:  1. Large, acute right MCA distribution infarct. This results in marked mass effect and right to left midline shift as well as subfalcine herniation intraventricular compression. Small amount of acute intracranial hemorrhage posteriorly involving the occipital/parietal lobe which likely reflects a combination of subarachnoid and parenchymal hemorrhage. 2. Occlusion of the proximal M1 segment of the right middle cerebral artery. 3. Background of white matter disease, consistent with chronic small vessel ischemic change. CT head wo contrast, 10/11/2021:  Large infarct on the right, with increased midline shift to the left. No obvious hemorrhage noted  Nodularity the posterior nasopharynx, similar to prior. CTA head and neck:   1. Occlusion of the right middle cerebral artery at the origin may be acute. Overall decreased size and number of enhancing right middle cerebral artery branches in comparison to the left. This correlates with findings of large acute/subacute right middle cerebral artery territory infarct. 2.  Short segment high-grade stenosis of a left middle cerebral artery M2 branch. Mild-to-moderate stenosis in the left middle cerebral artery M1 segment. Diffuse atherosclerotic irregularity of the more distal left middle cerebral artery branches.     3.  The bilateral posterior cerebral arteries are diffusely small and not well seen, underlying disease is not excluded. 4.  No hemodynamically significant stenosis in the bilateral cervical internal carotid arteries per NASCET criteria. Bilateral carotid bulb atherosclerotic plaque. 5.  Patent bilateral vertebral arteries. MRI wo contrast, 10/4/2021:  Unchanged subacute right MCA infarct. Labs:  Sodium: 164 -> 165 -> 164      Lipid Panel:       Ref. Range 10/3/2021 05:46   Cholesterol, Total Latest Ref Range: 0 - 199 mg/dL 165   HDL Cholesterol Latest Ref Range: 40 - 60 mg/dL 33 (L)   LDL Calculated Latest Ref Range: <100 mg/dL 107 (H)   Triglycerides Latest Ref Range: 0 - 150 mg/dL 124   VLDL Cholesterol Calculated Latest Ref Range: Not Established mg/dL 25      Hemoglobin A1C: 5.7     Assessment:Kike Walter is a 64year old woman with PMH GERD, obesity, tobacco abuse, HTN, HLD who was transferred to [facility] for altered mental status in the setting of a large right MCA stroke that happened prior to her admission on 10/2/2021. Unfortunately, she was not within the time window for tpa or mechanical thrombectomy when she initially presented with stroke. Neurosurgery is going to see her but hemicraniectomy for malignant cerebral edema is effective within 48 hours of ischemic insult and she is far outside that window.     Plan:  - Repeat head CT  - If repeat imaging stable then ok to transfer to floor with Q4 hour neuro checks  - NO dextrose containing fluids  - PT/OT/ST  - Continue aspirin - held this morning, awaiting decision on feeding tube  - Delirium Precautions: Maintain a regular night-day/sleep-wake cycle: discourage daytime naps, re-orient frequently, shades up during the daytime, family at bedside as often as possible, minimize nighttime awakenings, utilize relaxation channel on television   - Continue atorvastatin 80 mg PO daily  - HOB at 30 degrees, head neutral  - Palliative following, appreciate recs  - Continue Q4 hour sodium checks  - DVT prophylaxis    Renan Brewer APRN-CNP  Neurology & Neurocritical Care   Neurology Line: [phone]  [employer]: [facility] NPs  10/14/21

## 2021-10-14 NOTE — FLOWSHEET NOTE
10/14/21 1000   Family/Significant Other Communication   Reason patient updates. Name casey. Relationship Sibling   Response updates appreciated. Method of Communication In Person       Patient Sibling, Mara Brambila, updated on patient condition with all questions answered. Will continue to keep updated.

## 2021-10-14 NOTE — PROGRESS NOTES
ICU Progress Note    Admit Date: 10/11/2021  Day: 4  Vent Day: None  IV Access:Peripheral  IV Fluids: off 3% NS, may require D5 for hypernatremia  Vasopressors:None                Antibiotics: None  Diet: Diet NPO    CC: LUE weakness, AMS    Interval history:  NAEO  Patient's BP has been better controlled with SBP in the last 10 hours being 166 or less  Patient continues to be restless, opened eyes once on command, otherwise not obeying commands  This morning patient is restless. No acute changes to mental status exam, although she is not speaking at all this morning  Off 3% NS since sodium overcorrected    HPI:  Nilton Winchester is a 64year old Female with a PmHx of HTN, HLD, GERD, obesity, tobacco use who presented with one day of left-sided vision changes, fine motor deficits (dropping objects) in the L hand, and LUE paresthesia. Transferred for worsening lethargy and disorientation.     She was previously admitted for eye vision changes and headache. Hx of reporting stroke like symptoms in March for which she got an MRI. CT on 10/2 admission showed a large R MCA occlusion and Sulcal effacement throughout the right cerebral hemisphere with partial effacement of the right lateral and 3rd ventricles and minimal right left midline shift measuring 2 mm. Evidence of chronic infarcts. She was not a tPA or thrombectomy candidate. Head CT on 10/11 showed a large infarct on the right, with increased midline shift to the left. Elevated WBC count, with negative chest XR and urinalysis     Concern at [facility] for non-convulsive SE given patient's reported deteriorating mental status. Transferred to [facility] for consideration of 3% saline infusion and/or continuous EEG.     Medications:     Scheduled Meds:   enoxaparin  40 mg SubCUTAneous Daily    famotidine (PEPCID) injection  20 mg IntraVENous Daily    amLODIPine  10 mg Oral Daily    [Held by provider] carvedilol  12.5 mg Oral BID WC    atorvastatin  80 mg Oral Nightly    aspirin  81 mg Oral Daily    insulin lispro  0-6 Units SubCUTAneous TID     insulin lispro  0-3 Units SubCUTAneous Nightly     Continuous Infusions:   sodium chloride Stopped (10/13/21 1627)    dextrose       PRN Meds:labetalol, hydrALAZINE, ondansetron **OR** ondansetron, polyethylene glycol, acetaminophen **OR** acetaminophen, glucose, dextrose, glucagon (rDNA), dextrose    Objective:   Vitals:   T-max:  Patient Vitals for the past 8 hrs:   BP Temp Temp src Pulse Resp SpO2 Weight   10/14/21 0518 -- -- -- -- -- -- 139 lb 12.4 oz (63.4 kg)   10/14/21 0500 (!) 144/81 -- -- 101 12 100 % --   10/14/21 0430 -- -- -- 102 17 99 % --   10/14/21 0400 (!) 143/67 98.8 °F (37.1 °C) Oral 98 13 99 % --   10/14/21 0330 -- -- -- 96 15 100 % --   10/14/21 0300 (!) 118/52 -- -- 90 15 100 % --   10/14/21 0230 -- -- -- 85 14 99 % --   10/14/21 0200 (!) 176/136 -- -- 81 16 100 % --   10/14/21 0130 (!) 188/143 -- -- 94 17 100 % --   10/14/21 0100 (!) 175/114 -- -- 93 19 100 % --   10/14/21 0000 (!) 163/75 98.5 °F (36.9 °C) Oral 94 19 99 % --   10/13/21 2300 (!) 144/88 -- -- 94 16 100 % --       Intake/Output Summary (Last 24 hours) at 10/14/2021 0644  Last data filed at 10/13/2021 2300  Gross per 24 hour   Intake 215.51 ml   Output 1050 ml   Net -834.49 ml       Review of Systems   Unable to perform ROS: Mental status change   Denied pain. Physical Exam  Constitutional:       Appearance: She is ill-appearing.      Comments: Patient is lethargic, responsive to pain and opened eyes on command once  HENT:      Head: Normocephalic.      Right Ear: External ear normal.      Left Ear: External ear normal.      Nose: Nose normal.   Eyes:      General:         Right eye: No discharge.         Left eye: No discharge.      Extraocular Movements: Extraocular movements intact.      Conjunctiva/sclera: Conjunctivae normal.      Pupils: Pupils are equal, round, and reactive to light.    Cardiovascular:      Rate and Rhythm: Normal rate and regular rhythm.      Pulses: Normal pulses.      Heart sounds: Normal heart sounds. No murmur heard. No gallop.    Pulmonary:      Effort: Pulmonary effort is normal. No respiratory distress.      Breath sounds: Normal breath sounds. No wheezing.      Comments: Increased upper respiratory noise Wheezing. Abdominal:      General: Bowel sounds are normal. There is no distension.      Palpations: Abdomen is soft.      Tenderness: There is no abdominal tenderness. There is no guarding or rebound. Musculoskeletal:         General: No swelling.      Right lower leg: No edema.      Left lower leg: No edema. Skin:     General: Skin is warm and dry.      Coloration: Skin is not jaundiced.      Findings: Rash (Below R clavicle) present. No bruising. Neurological:      Mental Status: She is disoriented.      Motor: Weakness present.      Comments: Patient is able to spontaneously move all extremities except LUE which is flaccid. Withdraws extremities (excect LUE) to pain. LLE is weaker than R. Lethargic. LABS:    CBC:   Recent Labs     10/12/21  0611 10/13/21  0426 10/14/21  0450   WBC 19.4* 13.8* 16.3*   HGB 9.1* 8.2* 8.4*   HCT 26.9* 24.6* 25.2*    373 417   MCV 87.2 89.2 90.0     Renal:    Recent Labs     10/12/21  0611 10/12/21  0800 10/13/21  0426 10/13/21  0612 10/13/21  1600 10/13/21  2330 10/14/21  0450      < > 157*   < > 160* 162* 164*   K 3.9  --  4.0  --   --   --  3.8     --  124*  --   --   --  129*   CO2 22  --  21  --   --   --  22   BUN 52*  --  34*  --   --   --  30*   CREATININE 1.2  --  1.0  --   --   --  1.0   GLUCOSE 132*  --  122*  --   --   --  121*   CALCIUM 9.4  --  9.0  --   --   --  9.5   ANIONGAP 15  --  12  --   --   --  13    < > = values in this interval not displayed. Hepatic: No results for input(s): AST, ALT, BILITOT, BILIDIR, PROT, LABALBU, ALKPHOS in the last 72 hours. Troponin: No results for input(s): TROPONINI in the last 72 hours.   BNP: No results for so pt is unlikely to be a neurosurgery candidate. Worsening mass effect and development of SAH and intraparenchymal hemorrhage.  - restart aspirin and Lovenox per neuro  - statin  - PT/OT  - Neurosurgery consulted, appreciate recs  - Elevated HOB  - Few interventions are available given the age of her stroke  - Pt will likely require bridled NG tube to start feeds    Hypernatremia  Na overcorrected to 164 while weaning down on 3% NS gtt. - Neurology following  - q4h sodium checks  - Became hypernatremic on 3% NS wean with initial goal of 145-155 (at 165). Neurology advised to avoid D5, if fails to downtrend on its own, can start NS or half normal saline. Per neuro can likely be managed on the floor    HTN, poorly controlled  Outside hospital resumed home amlodipine and carvedilol but patient has had fluctuating highs and lows of BP. Orthostatic hypotension has resulted in 2 syncopal episodes while inpatient. She was given chlorthalidone, but then her Na decreased, BUN increased, and she had a syncopal spell, so this was stopped overnight.   - labetalol and hydralazine PRN  - continue home amlodipine and carvedilol with hold parameters when able to take PO     Syncopal episodes on 10/5 and 10/6  Likely vasovagal since both episodes occurred on the toilet. Could also be 2/2 orthostatic hypotension. No tele events documented. No seizure activity documented during her syncopal spells. ECHO normal.  - continuous tele  - no syncopal episodes while admitted     Leukocytosis  Likely 2/2 stroke. Concern for infection, CXR clear and UA repeatedly negative. - Continue to follow  - Blood cultures and urine cultures pending, NGTD     Delirium  AMS likely 2/2 stroke and delirium vs. Post-ictal in SE. Has required doses of Ativan for procedures.  Likely delirium and aphasia  - Supportive care  - Delirium precautions     HLD  - Lipitor 80 mg daily     Obesity  Encouraged diet and exercise as able.     Prediabetes   A1c 5.7  - LDSSI  - Hypoglycemia protocol, POCT checks    Code Status: FULL  FEN: NPO  PPX: Lovenox, Pepcid  DISPO: Can likely transfer to PCU    Rachel Calderon MD, PGY-1  10/14/21  6:44 AM    Patient seen, examined and discussed with the resident and I agree with the assessment and plan.   Can transfer out of ICU today to      Afia Goss MD

## 2021-10-14 NOTE — PROGRESS NOTES
Physical Therapy  Facility/Department: [facility] ICU  Daily Treatment Note  NAME: Juris Schilder  : 1960  MRN: [id number]    Date of Service: 10/14/2021    Discharge Recommendations:      PT Equipment Recommendations  Equipment Needed: No  Other: defer to next level of care    Assessment   Body structures, Functions, Activity limitations: Decreased functional mobility ; Decreased strength;Decreased endurance;Decreased vision/visual deficit; Decreased coordination;Decreased posture;Decreased ADL status; Decreased safe awareness;Decreased high-level IADLs;Decreased cognition;Decreased balance;Decreased fine motor control  Assessment: Pt demonstrating improved midline positioning with decreased need of physical assistance in sitting this date. Pt continues to demonstrate no command following, but showing purposeful movement of R UE (Brushed her hair out of her eyes in sitting). Pt still significantly below her baseline and would benefit from continued therapy to increase her independence. Treatment Diagnosis: Decreased independence with functional mobility  Prognosis: Fair;Guarded  Barriers to Learning: Cognition  REQUIRES PT FOLLOW UP: Yes  Activity Tolerance  Activity Tolerance: Patient limited by fatigue;Patient limited by endurance; Patient limited by cognitive status     Patient Diagnosis(es): The primary encounter diagnosis was Brain herniation ([facility] [address] Diagnoses of Acute CVA (cerebrovascular accident) [facility]) and Hypertensive emergency were also pertinent to this visit. has a past medical history of GERD (gastroesophageal reflux disease), HLD (hyperlipidemia), Hypertension, and Stroke ([facility] [address] has a past surgical history that includes Hysterectomy and Cholecystectomy.     Restrictions  Restrictions/Precautions  Restrictions/Precautions: General Precautions, Fall Risk, Up as Tolerated, Seizure  Position Activity Restriction  Other position/activity restrictions: HOB elevated to 30*  Subjective   General  Chart Reviewed: Yes  Additional Pertinent Hx: Per the H&P: Pt is a 64year old Female with a PmHx of HTN, HLD, GERD, obesity, tobacco use who presented with one day of left-sided vision changes, fine motor deficits (dropping objects) in the L hand, and LUE paresthesia. Transferred for worsening lethargy and disorientation. She was previously admitted for eye vision changes and headache. Hx of reporting stroke like symptoms in March for which she got an MRI. CT on 10/2 admission showed a large R MCA occlusion and Sulcal effacement throughout the right cerebral hemisphere with partial effacement of the right lateral and 3rd ventricles and minimal right left midline shift measuring 2 mm. Evidence of chronic infarcts. She was not a tPA or thrombectomy candidate. Head CT on 10/11 showed a large infarct on the right, with increased midline shift to the left. Elevated WBC count, with negative chest XR and urinalysis Concern at [facility] for non-convulsive SE given patient's reported deteriorating mental status. Transferred to [facility] for consideration of 3% saline infusion and/or continuous EEG. Family / Caregiver Present: Yes (Sister, Nida Ortega)  Referring Practitioner: Dr. Latrice Maguire  Subjective  Subjective: No functional statement made. General Comment  Comments: Pt was supine in bed upon arrival.  Pain Screening  Patient Currently in Pain: Other (comment) (unable to assess,  no outward signs of pain/discomfort noted)  Vital Signs  Patient Currently in Pain: Other (comment) (unable to assess,  no outward signs of pain/discomfort noted)       Orientation     Cognition      Objective   Bed mobility  Rolling to the L and R with Max A to Total A  Supine to Sit: Dependent/Total;2 Person assistance (Max x 2; No active movement from pt noted in assisting transfer). Sit to Supine: Dependent/Total;2 Person assistance (Assist x 2)  Scooting: Dependent/Total  Multiple bouts of bed mobility completed throughout the session.  Step by step VC and Patient at risk for falls, Left in bed, Nurse notified, Bed alarm in place  Restraints  Initially in place: Yes  Restraints: R UE     Therapy Time   Individual Concurrent Group Co-treatment   Time In 1008         Time Out 1101         Minutes [address], PT

## 2021-10-15 NOTE — PROGRESS NOTES
Occupational Therapy  Facility/Department: [place] [address] PCU  Daily Treatment Note  NAME: Rina Adams  : 1960  MRN: [id number]    Date of Service: 10/15/2021    Discharge Recommendations:  Rina Adams scored a 6/24 on the AM-PAC ADL Inpatient form. Current research shows that an AM-PAC score of 17 or less is typically not associated with a discharge to the patient's home setting. Based on the patient's AM-PAC score and their current ADL deficits, it is recommended that the patient have 3-5 sessions per week of Occupational Therapy at d/c to increase the patient's independence. Please see assessment section for further patient specific details. If patient discharges prior to next session this note will serve as a discharge summary. Please see below for the latest assessment towards goals. OT Equipment Recommendations  Other: defer to d/c location    Assessment   Performance deficits / Impairments: Decreased functional mobility ; Decreased safe awareness;Decreased balance;Decreased vision/visual deficit; Decreased ADL status; Decreased ROM; Decreased strength;Decreased coordination;Decreased high-level IADLs;Decreased cognition;Decreased fine motor control;Decreased endurance;Decreased sensation;Decreased posture  Assessment: Pt with decreased sitting balance at eob today. Pt not able to follow commands and not responsive to stimuli. Eyes closed majority of session. Pt requires total care at bed level. Pt would benefit from continued OT tx to maximize her abilities  Treatment Diagnosis: Decreased ADL status and decreased balance 2/2 CVA  REQUIRES OT FOLLOW UP: Yes  Activity Tolerance  Activity Tolerance: Treatment limited secondary to decreased cognition  Safety Devices  Type of devices: Bed alarm in place; Left in bed;Call light within reach;Nurse notified  Restraints  Initially in place: Yes  Restraints: RUE soft wrist restraint reapplied Restrictions  Restrictions/Precautions  Restrictions/Precautions: General Precautions, Fall Risk, Up as Tolerated, Seizure  Position Activity Restriction  Other position/activity restrictions: HOB elevated to 30*     Subjective   General  Chart Reviewed: Yes  Patient assessed for rehabilitation services?: Yes  Additional Pertinent Hx: Eugenio Mixon is a 64 y.o. female with PHx sig for HTN, HLD, GERD, obesity, tobacco abuse who initially presented to [facility] on 10/2/2021 with left sided field cut and left hand clumsiness. Her initial head CT showed a right MCA stroke. Her CTA showed occlusion of the right MCA at the origin  Family / Caregiver Present: No  Referring Practitioner: Dr. Pauline Silva  Diagnosis: CVA with Left side hemiparesis, Left visual neglect  Subjective  Subjective: Pt sleeping in bed, became more alert with bed mobility with eyes open but not able to direct gaze, minimally responsive to stimuli            Objective    ADL  Feeding: NPO (corepak)  Grooming: Dependent/Total (to wash face while seated eob, no pt initiation to grasp washcloth with R hand)           Balance  Sitting Balance: Maximum assistance (seated eob x10 minutes, pt leaning all directions; completing propping on L elbow with total assist to return to midline) Posture: shoulders forward rounded, neck flexed w/ inability to extend neck or hold upright once extended position facilitated  Standing Balance: Unable to assess(comment)    Bed mobility  Supine to Sit: Dependent/Total (of 2)  Sit to Supine: Dependent/Total (of 2)  Scooting: Dependent/Total (of 2)  Comment: spontaneous, poorly controlled movement of R UE and LE at eob. Required stabilization of R LE to assist maintaining pt balance at eob.  No active movement of L UE/LE noted              Type of ROM/Therapeutic Exercise  Comment: at eob, completed PROM B UE all planes x5 reps           Plan   Plan  Times per week: 2-5x  Times per day: Daily  Current Treatment Recommendations: Strengthening, ROM, Balance Training, Functional Mobility Training, Safety Education & Training, Positioning, Self-Care / ADL, Neuromuscular Re-education, Cognitive/Perceptual Training, Patient/Caregiver Education & Training    AM-PAC Score  AM-PAC Inpatient Daily Activity Raw Score: 6 (10/15/21 1306)  AM-PAC Inpatient ADL T-Scale Score : 17.07 (10/15/21 1306)  ADL Inpatient CMS 0-100% Score: 100 (10/15/21 1306)  ADL Inpatient CMS G-Code Modifier : CN (10/15/21 1306)    Goals  Short term goals  Time Frame for Short term goals: By D/C  Short term goal 1: Pt will tolerate seated EOB for 10 mins w/ mod A- not met  Short term goal 2: Pt will participate in bed to chair transfer- not met  Short term goal 3: Pt will keep eyes open ~50% of the session w/ VCs- not met  Short term goal 4: supervision with 10 reps LUE self ROM/AROM-- not met  Patient Goals   Patient goals : go home when able       Therapy Time   Individual Concurrent Group Co-treatment   Time In 0950         Time Out 1020         Minutes 30         Timed Code Treatment Minutes: 0401 [facility], OT

## 2021-10-15 NOTE — PLAN OF CARE
Problem: Non-Violent Restraints  Goal: Removal from restraints as soon as assessed to be safe  Outcome: Ongoing  Note: Will continue to manage to unit protocol and order set     Problem: Non-Violent Restraints  Goal: No harm/injury to patient while restraints in use  Outcome: Ongoing     Problem: Non-Violent Restraints  Goal: Patient's dignity will be maintained  Outcome: Ongoing

## 2021-10-15 NOTE — CONSULTS
[address]   Electrophysiology Nurse Practitioner  Consult Rounding    Date: 10/15/2021    Reason for Consultation/ Chief Complaint: CVA    History Obtained From: Patient and medical record. Cardiac Hx: Amanda Portillo is a 64 y.o. woman w/ PMH HTN, HLD, GERD, obesity, tobacco use who p/w left sided weakness, paresthesia and vision changes, CT head (10/11) showed large infarct on the right, MRI brain showed increased mass effect and R--> L midline shift, Pt transferred to [facility]    HPI: Patient was admitted to [facility] with complaints of left-sided vision changes, weakness, paresthesias. Her CTA showed occlusion of the R MCA with significant stenosis in the LMCA. Patient with changes in neuro exam, deteriorating status, repeat CT showed large infarct on the right, MRI brain showed increased mass effect and right to left midline shift and patient was transferred to [facility]. Echo showed normal EF 55-60%, no evidence of shunting. Telemetry shows NSR, rates controlled. Patient unable to answer ROS d/t altered mental status. GI has been consulted for PEG tube placement. Of note, patient had syncopal episodes while at [facility] that both occurred on the toilet. No events on telemetry identified, vitals stable, echo showed normal LV function. Home medications:   No current facility-administered medications on file prior to encounter.      Current Outpatient Medications on File Prior to Encounter   Medication Sig Dispense Refill    losartan (COZAAR) 25 MG tablet Take 1 tablet by mouth daily 30 tablet 3    atorvastatin (LIPITOR) 80 MG tablet Take 1 tablet by mouth nightly 30 tablet 3    amLODIPine (NORVASC) 10 MG tablet Take 1 tablet by mouth daily 90 tablet 1    aspirin 81 MG chewable tablet Take 1 tablet by mouth daily 90 tablet 1    carvedilol (COREG) 12.5 MG tablet Take 1 tablet by mouth 2 times daily 180 tablet 1    omeprazole (PRILOSEC) 20 MG delayed release capsule Take 1 capsule by mouth daily 90 capsule 1       Scheduled Meds:   enoxaparin  40 mg SubCUTAneous Daily    famotidine (PEPCID) injection  20 mg IntraVENous Daily    amLODIPine  10 mg Oral Daily    carvedilol  12.5 mg Oral BID     atorvastatin  80 mg Oral Nightly    aspirin  81 mg Oral Daily    insulin lispro  0-6 Units SubCUTAneous TID     insulin lispro  0-3 Units SubCUTAneous Nightly     Continuous Infusions:   dextrose       PRN Meds:labetalol, hydrALAZINE, ondansetron **OR** ondansetron, polyethylene glycol, acetaminophen **OR** acetaminophen, glucose, dextrose, glucagon (rDNA), dextrose       Past Medical History:   Diagnosis Date    GERD (gastroesophageal reflux disease)     HLD (hyperlipidemia)     Hypertension     Stroke ([address] 10/02/2021    R MCA        Past Surgical History:   Procedure Laterality Date    CHOLECYSTECTOMY      HYSTERECTOMY         Allergies   Allergen Reactions    Bee Pollen Swelling    Lisinopril Nausea Only    Varenicline Nausea And Vomiting       Social History:  Reviewed. reports that she has been smoking cigarettes. She started smoking about 41 years ago. She has a 20.00 pack-year smoking history. She has never used smokeless tobacco. She reports that she does not drink alcohol and does not use drugs. Family History:  Reviewed. family history is not on file. Review of System: unable to obtain d/t pts altered mental status    · Constitutional: No fevers, chills. · Eyes: No visual changes or diplopia. No scleral icterus. · ENT: No Headaches. No mouth sores or sore throat. · Cardiovascular: No for chest pain, No for dyspnea on exertion, No for palpitations or No for loss of consciousness. No cough, hemoptysis, No for pleuritic pain, or phlebitis. · Respiratory: No for cough or wheezing. No hematemesis. · Gastrointestinal: No abdominal pain, blood in stools. · Musculoskeletal: No gait disturbance,    · Integumentary: No rash or pruritis.   · Neurological: No headache, change in muscle strength, numbness or tingling. · Psychiatric: No anxiety, or depression. Physical Examination:  Vitals:    10/15/21 1209   BP: 121/63   Pulse: 83   Resp: 21   Temp:    SpO2: 90%      In: 60 [NG/GT:60]  Out: 550    Wt Readings from Last 3 Encounters:   10/14/21 139 lb 12.4 oz (63.4 kg)   10/11/21 195 lb (88.5 kg)   21 187 lb (84.8 kg)     Temp  Av.7 °F (37.1 °C)  Min: 98.3 °F (36.8 °C)  Max: 98.9 °F (37.2 °C)  Pulse  Av.9  Min: 83  Max: 96  BP  Min: 121/63  Max: 147/61  SpO2  Av %  Min: 90 %  Max: 100 %    Intake/Output Summary (Last 24 hours) at 10/15/2021 1433  Last data filed at 10/15/2021 1210  Gross per 24 hour   Intake 60 ml   Output 550 ml   Net -490 ml       · Constitutional: Disoriented. No distress. · Head: Normocephalic and atraumatic. · Mouth/Throat: Oropharynx is clear and moist.   · Eyes: Conjunctivae clear without jaunduice. PERRL. · Neck: Neck supple. No rigidity. No JVD present. · Cardiovascular: Normal rate, regular rhythm, S1&S2. · Pulmonary/Chest: Bilateral respiratory sounds. No wheezes, No rhonchi. · Abdominal: Soft. Bowel sounds present. No distension, No tenderness. · Musculoskeletal: No tenderness. No edema    · Lymphadenopathy: Has no cervical adenopathy. · Neurological: Alert and oriented. Cranial nerve appears intact, No Gross deficit   · Skin: Skin is warm and dry. No rash noted. · Psychiatric: Has a normal mood, affect and behavior     Labs:  Reviewed. Recent Labs     10/13/21  0426 10/13/21  0612 10/14/21  0450 10/14/21  0832 10/15/21  0500 10/15/21  0847 10/15/21  1207   *   < > 164*   < > 165* 165* 169*   K 4.0  --  3.8  --  3.7  --   --    *  --  129*  --  131*  --   --    CO2 21  --  22  --  22  --   --    BUN 34*  --  30*  --  40*  --   --    CREATININE 1.0  --  1.0  --  1.3*  --   --     < > = values in this interval not displayed.      Recent Labs     10/13/21  0426 10/14/21  0450 10/15/21  0500   WBC vascular accident) ([address] 03/02/2021    Hypertensive emergency 03/01/2021    Hypomagnesemia 03/01/2021    Syncope 03/01/2021    Smoker 03/01/2021    Hypokalemia 03/01/2021    Thyroid nodule 03/01/2021    Hypertensive urgency 03/01/2021    Left lumbar radiculitis 06/09/2014    Myofascial muscle pain 06/09/2014        Assessment:   1. Brain herniation ([address]    2. Acute CVA (cerebrovascular accident) ([address]    3. Hypertensive emergency    4. Syncope    Cardiac Hx: Briana Henley is a 64 y.o. woman w/ PMH HTN, HLD, GERD, obesity, tobacco use who p/w left sided weakness, paresthesia and vision changes, CT head (10/11) showed large infarct on the right, MRI brain showed increased mass effect and R--> L midline shift, Pt transferred to [facility]    Acute ischemic stroke  - On ASA, statin  - Echo showed normal EF, no evidence of shunting  - Will place 30 day monitor at DC  - F/u w/ NP in 8 weeks  - ECG ordered and results personally reviewed     Syncope  - Echo showed normal LV function  - Place 30-day monitor DC    HTN  - BP controlled  - On amlodipine 10 mg daily, Coreg 12.5 mg twice daily  - Per neuro treat SBP >170    EF of 78-54%  No known systolic HF  No known CAD  No known AF  No tobacco use. All questions and concerns were addressed to the patient/family. Alternatives to my treatment were discussed. The note was completed using EMR. Every effort was made to ensure accuracy; however, inadvertent computerized transcription errors may be present.      Lj Godinez, MARLENE  [facility]

## 2021-10-15 NOTE — PROGRESS NOTES
Speech Language Pathology  Facility/Department: [facility] ICU  Dysphagia Daily Treatment Note    NAME: Deedee Jacobsen  : 1960  MRN: [id number]    Patient Diagnosis(es):   Patient Active Problem List    Diagnosis Date Noted    Acute CVA (cerebrovascular accident) ([facility] [address] 10/11/2021    Peripheral visual field defect, left     Acute ischemic stroke ([facility] 10/02/2021    SOB (shortness of breath) 2021    Dizziness 2021    GERD (gastroesophageal reflux disease) 2021    Lumbar spondylosis 2021    Tobacco use disorder 2021    HTN (hypertension), benign     Dyslipidemia     [facility]    Hypertensive emergency 2021    Hypomagnesemia 2021    Syncope 2021    Smoker 2021    Hypokalemia 2021    Thyroid nodule 2021    Hypertensive urgency 2021    Left lumbar radiculitis 2014    Myofascial muscle pain 2014     Allergies: Allergies   Allergen Reactions    Bee Pollen Swelling    Lisinopril Nausea Only    Varenicline Nausea And Vomiting       CXR (date)- 10/11/2021  Impression   No acute cardiac or pulmonary disease.         Previous MBS : no prior MBS    Chart reviewed. Medical Diagnosis: Acute CVA  Treatment Diagnosis: Oropharyngeal Dysphagia     BSE Impression (10/13/2021)- Dysphagia Diagnosis: Suspected needs further assessment;Severe pharyngeal stage dysphagia; Severe oral stage dysphagia  Dysphagia Impression : Patient presents with suspected severe oropharyngeal dysphagia in the setting of altered mental status, lethargy, post acute CVA. Completed oral hygiene with suction kit; pt with inconsistent inappropriate biting on suction despite cues. Assessed tolerance ice chips; pt with limited oral acceptance, anterior labial loss of bolus, deficient oral prep, no clear evidence of swallow initiation/laryngeal movement. Pt remains a high aspiration/choking risk at this time.  Recommend continue strict NPO, oral hygiene w/ suction kit as able, consider small amounts ice chips when sufficiently alert. Will continue to follow. Dysphagia Outcome Severity Scale: Level 1: Severe dysphagia- NPO. Unable to tolerate any PO safely     MBS results-  Not appropriate at this time given severity of deficits, oral guarding, and absent response to PO    Pain: None indicated     Current Diet : NPO   ,   Treatment:  Pt seen bedside to address the following goals:  Goal 1: Patient will participate in ongoing assessment of swallow function as appropriate. 10/14: Pt lying in bed upon entry. Pt's sister present at side. Pt's eyes closed throughout majority of session with no verbalizations, but demonstrated alertness with physical movements and oral restriction. Oral care provided with suction toothette system and significant assistance for mouth opening secondary to oral defensiveness (required bite block to limit biting down on toothette). Pt administered ice chips, teaspoons of water, and small sips of water via straw. Required max cueing to open mouth to receive. Pt demonstrated labial seal and sucking with water via teaspoon and straw although inconsistent. Once received, pt demonstrated absent to very delayed swallow initiation with max cues. Required mod cues to maintain alertness throughout. No s/s of aspiration noted despite delayed/absent swallow response. Pt's sister endorsed that pt enjoys drinking [employer] [facility] and was accepting of it at previous hospital stay. Encouraged pt's sister to bring preferred item in and speech therapy could complete trials with pt in effort to stimulate swallow function. Recommend continuing NPO diet and oral hygiene with suction kit as able, providing ice chips one at a time or tsp thin liquid when pt alert for stimulation of swallow function and hydration of oral mucosa. Continue goal.   10/15: Pt in bed with eyes closed, non-purposeful movement of R side.  Eyes remained closed throughout despite MAX stimuli. Pt with audible gurgling from pharynx and O2 sats at 88-90% - attempted suctioning to back of throat but pt resistant and biting on yankeur. Pt with oral guarding and resistance to oral care with MAX effort required + multiple tactile and verbal cues. Unable to fully assess oral cavity but malodorous and yellowish white secretions removed with toothbrush / toothette. Pt biting down on all utensils placed in oral cavity. Absent response to trials of ice chips and 1/2 tsp thin water with eventual suctioning required to remove from oral cavity. PO trials d/c'd out of concern for pt safety. Continue NPO with alternative means of nutrition, frequent oral care as able with use of bite block. Continue goal.    Patient/Family/Caregiver Education:  Attempted to educate to role of SLP and purpose of visit. Pt makes no response to education. Compensatory Strategies:  Oral care via suction kit 3-5x/day  Upright for ice chips / tsp thin water  Full feed assist       Plan:  Dysphagia tx 3-5x/wk for LOS  Diet recommendations: NPO with ice chips / tsp thin water when pt alert for stimulation of swallow function and hydration of oral mucosa    Recommend increase frequency of oral care to reduce risk of aspiration PNA. As pt is unable to expectorate at this time, adequate oral care would consist of cleaning entire oral cavity with suction kit 3x/day. Pt with oral guarding - recommend use of bite block. DC recommendation: Ongoing dysphagia therapy indicated   Treatment: 15  D/W nursingSania  Needs met prior to leaving room, call button in reach. RN notified. Callie Morgan MA [facility]-SLP; [id number]  Speech-Language Pathologist    Pager # [phone]  Phone # [phone]  Office # [phone]    If patient is discharged prior to next session, this note will serve as discharge summary.

## 2021-10-15 NOTE — PLAN OF CARE
Problem: Non-Violent Restraints  Goal: Removal from restraints as soon as assessed to be safe  10/15/2021 1225 by Roman Oconnor RN  Outcome: Ongoing  Note: Patient continues to attempt to remove corpak and pull at femoral line despite attempts to re-direct. ROM assessed with no s/s of injury noted. Problem: Falls - Risk of:  Goal: Will remain free from falls  Description: Will remain free from falls  10/15/2021 1225 by Roman Oconnor RN  Outcome: Ongoing  Note: Patient resting in bed, low position. Alarm armed.  Avasys monitor in place     Problem: Skin Integrity:  Goal: Will show no infection signs and symptoms  Description: Will show no infection signs and symptoms  10/15/2021 1225 by Roman Oconnor RN  Outcome: Ongoing

## 2021-10-15 NOTE — PROGRESS NOTES
Patient Na critical value 164 unchanged against prior two lab draws; hospitalist notified and confirmed no fluids; redrawn Q4; will continue to monitor

## 2021-10-15 NOTE — PROGRESS NOTES
5678 Patient very restless and moving about in bed in un-safe manner attempting to free restraint and grabbing for NG; CVC femoral; Diaper; Purewick; hospitalist coverage contacted and ativan 1 mg ordered and administered; Patient tolerating well and resting with RASS of -1.     Patient Rass returned to +1; Provider reordered 1mg of Ativan one time; administered ~0330 per order will continue to monitor

## 2021-10-15 NOTE — PROGRESS NOTES
Palliative Care Chart Review  and Check in Note:     NAME:  Rina Postal  Admit Date: 10/11/2021  Hospital Day:  Hospital Day: 5   Current Code status: Full Code    Palliative care is continuing to following Ms. Jewel Jansen for symptom management,  and goals of care discussion as needed. Patient's chart reviewed today 10/15/21. Saw pt at the bedside, no visitors present. She remains obtunded, moving right side spontaneously. Is able to answer to her name. Spoke with pt's son Richard Greene over the phone. They requested a referral to [facility] yesterday for SNF. Explained again that pt would need a PEG tube prior to going to SNF. Richard Greene still would like to discuss with sister Liset Hernandez, but reports they are leaning towards doing a PEG tube. Reinforced that if they opt not to do the PEG tube, hospice would be next step. Explained would consult GI in the mean time to evaluate and Richard Greene can speak with Liset Hernandez prior to giving official consent. Encouraged him to do so this morning. D/w Dr Jairo Anglin. The following are the currently established goals/code status, and Symptom management. Goals of care: Continue with current management. Family reports leaning towards PEG tube and SNF to allow pt more time to recover. They understand that she will have permanent deficits from her stroke and if she declines at SNF or does not have acceptable level of recovery they would consider hospice. Code status: Full Code    Discharge plan: Planning for [facility] when medically ready for discharge. Family understands that if they opt against PEG tube next step would be hospice.        RONNELL Mario - CNP  10/15/21  9:30 AM

## 2021-10-15 NOTE — PROGRESS NOTES
Patient transferred to PCU bed [id number]; [id number]; SBAR report provided to unit charge; patient tolerated well; Son \"Tiago\" POC updated on status change and transfer.

## 2021-10-15 NOTE — PROGRESS NOTES
Physical Therapy  Facility/Department: [place] [address] [facility]U  Daily Treatment Note  NAME: Ayaka Hong  : 1960  MRN: [id number]    Date of Service: 10/15/2021    Discharge Recommendations: Ayaka Hong scored a 6/24 on the AM-PAC ADL Inpatient form. Current research shows that an AM-PAC score of 17 or less is typically not associated with a discharge to the patient's home setting. Based on the patient's AM-PAC score and their current ADL deficits, it is recommended that the patient have 3-5 sessions per week of Physical Therapy at d/c to increase the patient's independence. Please see assessment section for further patient specific details. PT Equipment Recommendations  Equipment Needed: No  Other: defer to next level of care    Assessment   Assessment: Pt was unable to interact or participate with staff; dependent for all functional mobility. Sitting EOB for approx 10 mins requiring max assist for sitting balance. Pt was left in bed with needs in reach. Treatment Diagnosis: Decreased independence with functional mobility  Specific instructions for Next Treatment: progress mobility as tolerated  Prognosis: Fair;Guarded  Decision Making: High Complexity  PT Education: Goals;PT Role  Patient Education: No evidence of learning  Barriers to Learning: Cognition  REQUIRES PT FOLLOW UP: Yes     Patient Diagnosis(es): The primary encounter diagnosis was Brain herniation ([facility] [address] Diagnoses of Acute CVA (cerebrovascular accident) [facility]) and Hypertensive emergency were also pertinent to this visit. has a past medical history of GERD (gastroesophageal reflux disease), HLD (hyperlipidemia), Hypertension, and Stroke ([facility] [address] has a past surgical history that includes Hysterectomy and Cholecystectomy.     Restrictions  Restrictions/Precautions  Restrictions/Precautions: General Precautions, Fall Risk, Up as Tolerated, Seizure  Position Activity Restriction  Other position/activity restrictions: HOB elevated to 30*  Subjective General  Chart Reviewed: Yes  Additional Pertinent Hx: Per the H&P: Pt is a 64year old Female with a PmHx of HTN, HLD, GERD, obesity, tobacco use who presented with one day of left-sided vision changes, fine motor deficits (dropping objects) in the L hand, and LUE paresthesia. Transferred for worsening lethargy and disorientation. She was previously admitted for eye vision changes and headache. Hx of reporting stroke like symptoms in March for which she got an MRI. CT on 10/2 admission showed a large R MCA occlusion and Sulcal effacement throughout the right cerebral hemisphere with partial effacement of the right lateral and 3rd ventricles and minimal right left midline shift measuring 2 mm. Evidence of chronic infarcts. She was not a tPA or thrombectomy candidate. Head CT on 10/11 showed a large infarct on the right, with increased midline shift to the left. Elevated WBC count, with negative chest XR and urinalysis Concern at [place] [facility] for non-convulsive SE given patient's reported deteriorating mental status. Transferred to [facility] for consideration of 3% saline infusion and/or continuous EEG. Response To Previous Treatment: Patient with no complaints from previous session. Referring Practitioner: Dr. Connor Traore  Subjective  Subjective: No functional statement made.  Unable to assess pain          Orientation  Orientation  Overall Orientation Status: Impaired  Cognition      Objective   Bed mobility  Supine to Sit: Dependent/Total;2 Person assistance  Sit to Supine: 2 Person assistance;Dependent/Total  Scooting: Dependent/Total  Transfers  Sit to Stand: Unable to assess           Exercises  Comments: Sitting EOB for approx 10 mins requiring Max assist for trunk control        AM-PAC Score  AM-PAC Inpatient Mobility Raw Score : 6 (10/15/21 1029)  AM-PAC Inpatient T-Scale Score : 23.55 (10/15/21 1029)  Mobility Inpatient CMS 0-100% Score: 100 (10/15/21 1029)  Mobility Inpatient CMS G-Code Modifier : CN (10/15/21 1029) Goals  Short term goals  Time Frame for Short term goals: by d/c  Short term goal 1: Pt will complete bed mobility with MIN A --Ongoing  Short term goal 2: Pt will transfer with <> stand with MAX A--Ongoing  Short term goal 3: Pt will transfer bed to chair via stand pivot with Total (Mod of 1 and Min of 1) A.--Ongoing  Short term goal 4: Ambulate 50 feet with LRAD and min assist.- 10/6: 10' with Mod A x 2   -10/11 NT  Short term goal 5: By 10/6/21 Patient will tolerate 12-15 reps of her exercises to maximize her strength/endurance- MET, on going   -10/11 on-going  Patient Goals   Patient goals : none stated    Plan    Plan  Times per week: 2-5x/wk  Times per day: Daily  Plan weeks: 10/13/21  Specific instructions for Next Treatment: progress mobility as tolerated  Current Treatment Recommendations: Strengthening, Functional Mobility Training, Equipment Evaluation, Education, & procurement, ROM, Transfer Training, Gait Training, Safety Education & Training, Balance Training, ADL/Self-care Training, Endurance Training, Positioning, Patient/Caregiver Education & Training, Stair training  Safety Devices  Type of devices:  All fall risk precautions in place, Call light within reach, Patient at risk for falls, Left in bed, Nurse notified, Bed alarm in place  Restraints  Initially in place: Yes  Restraints: R UE     Therapy Time   Individual Concurrent Group Co-treatment   Time In       0950   Time Out       1020   Minutes       30   Timed Code Treatment Minutes: Tony 145, PTA

## 2021-10-15 NOTE — PROGRESS NOTES
Hospitalist Progress Note      PCP: RONNELL Craig CNP    Date of Admission: 10/11/2021    Chief Complaint: Altered mental status    Hospital Course: 64year old Female with a PmHx of HTN, HLD, GERD, obesity, tobacco use who presented with one day of left-sided vision changes, fine motor deficits (dropping objects) in the L hand, and LUE paresthesia. Transferred for worsening lethargy and disorientation. Subjective: No acute events reported overnight. Patient remains encephalopathic. At the time of my assessment she was sleeping and hard to arouse      Medications:  Reviewed    Infusion Medications    dextrose       Scheduled Medications    enoxaparin  40 mg SubCUTAneous Daily    famotidine (PEPCID) injection  20 mg IntraVENous Daily    amLODIPine  10 mg Oral Daily    carvedilol  12.5 mg Oral BID WC    atorvastatin  80 mg Oral Nightly    aspirin  81 mg Oral Daily    insulin lispro  0-6 Units SubCUTAneous TID WC    insulin lispro  0-3 Units SubCUTAneous Nightly     PRN Meds: labetalol, hydrALAZINE, ondansetron **OR** ondansetron, polyethylene glycol, acetaminophen **OR** acetaminophen, glucose, dextrose, glucagon (rDNA), dextrose      Intake/Output Summary (Last 24 hours) at 10/15/2021 1223  Last data filed at 10/15/2021 1210  Gross per 24 hour   Intake 60 ml   Output 550 ml   Net -490 ml       Physical Exam Performed:    /63   Pulse 83   Temp 98.9 °F (37.2 °C) (Axillary)   Resp 21   Ht 5' 4.02\" (1.626 m)   Wt 139 lb 12.4 oz (63.4 kg)   SpO2 90%   BMI 23.98 kg/m²     General appearance: Encephalopathic, drowsy  HEENT: Pupils equal, round, and reactive to light. Conjunctivae/corneas clear. Neck: Supple, with full range of motion. No jugular venous distention. Trachea midline. Respiratory:  Normal respiratory effort. Clear to auscultation, bilaterally without Rales/Wheezes/Rhonchi.   Cardiovascular: Regular rate and rhythm with normal S1/S2 without murmurs, rubs or gallops. Abdomen: Soft, non-tender, non-distended with normal bowel sounds. Musculoskeletal: No clubbing, cyanosis or edema bilaterally. Full range of motion without deformity. Skin: Skin color, texture, turgor normal.  No rashes or lesions. Neurologic: Unable to assess today  Psychiatric: Encephalopathic,   Capillary Refill: Brisk,3 seconds, normal   Peripheral Pulses: +2 palpable, equal bilaterally       Labs:   Recent Labs     10/13/21  0426 10/14/21  0450 10/15/21  0500   WBC 13.8* 16.3* 13.5*   HGB 8.2* 8.4* 7.8*   HCT 24.6* 25.2* 23.8*    417 387     Recent Labs     10/13/21  0426 10/13/21  0612 10/14/21  0450 10/14/21  0832 10/15/21  0000 10/15/21  0500 10/15/21  0847   *   < > 164*   < > 165* 165* 165*   K 4.0  --  3.8  --   --  3.7  --    *  --  129*  --   --  131*  --    CO2 21  --  22  --   --  22  --    BUN 34*  --  30*  --   --  40*  --    CREATININE 1.0  --  1.0  --   --  1.3*  --    CALCIUM 9.0  --  9.5  --   --  9.4  --     < > = values in this interval not displayed. No results for input(s): AST, ALT, BILIDIR, BILITOT, ALKPHOS in the last 72 hours. No results for input(s): INR in the last 72 hours. No results for input(s): Teresa Gazella in the last 72 hours. Urinalysis:      Lab Results   Component Value Date    NITRU Negative 10/09/2021    WBCUA 6-9 10/06/2021    BACTERIA Rare 10/06/2021    RBCUA 5-10 10/06/2021    BLOODU Negative 10/09/2021    SPECGRAV 1.025 10/09/2021    GLUCOSEU Negative 10/09/2021       Radiology:  XR ABDOMEN (KUB) (SINGLE AP VIEW)   Final Result      Nonspecific bowel gas pattern. CT HEAD WO CONTRAST   Final Result      Extensive edema seen throughout the right MCA distribution compatible with large infarct. Associated mass effect is seen with mild right to left midline shift of the ventricles. No definite acute hemorrhage identified. MRI BRAIN W WO CONTRAST   Final Result      1.  Large, acute right MCA distribution infarct. This results in marked mass effect and right to left midline shift as well as subfalcine herniation intraventricular compression. Small amount of acute intracranial hemorrhage posteriorly involving the    occipital/parietal lobe which likely reflects a combination of subarachnoid and parenchymal hemorrhage. 2. Occlusion of the proximal M1 segment of the right middle cerebral artery. 3. Background of white matter disease, consistent with chronic small vessel ischemic change. Assessment/Plan:    Active Hospital Problems    Diagnosis     Acute CVA (cerebrovascular accident) ([facility] [address] [I63.9]      Large right middle cerebral artery ischemic CVA  Subarachnoid hemorrhage and intraparenchymal hemorrhagic conversion  Acute encephalopathy  Vasogenic edema with mass-effect  -Neurology, Neurosurgery following  -Continue aspirin, Lipitor  -MRI of the brain revealed increased mass-effect with right-to-left midline shift as well as subfalcine herniation with intraventricular compression  -Continue neurochecks  -Palliative care following, family is leaning towards pursuing with aggressive care. Will consult GI for possible PEG tube placement    Hypernatremia  -iatrogenic with hypertonic saline drip. 3% stopped. Continue to monitor.   Cannot give D5 free water due to concern for brain edema    Hypertension  -Continue Norvasc, Coreg     DVT Prophylaxis: Lovenox  Diet: Diet NPO  Code Status: Full Code    PT/OT Eval Status: Pending    Dispo -awaiting improvement in mental status    Dimitrios Thayer MD

## 2021-10-15 NOTE — PROGRESS NOTES
Initial Chief Complaint/Reason for Consult: R MCA ischemic stroke    Interval History:  Continues to be lethargic, moving around in bed. Spontaneously moving R side. Pupils equal and reactive to light. History of Present Illness:  Lucie Duane is a 64 y.o. female with PHx sig for HTN, HLD, GERD, obesity, tobacco abuse who initially presented to [place] on 10/2/2021 with left sided field cut and left hand clumsiness. Her initial head CT showed a right MCA stroke. Her CTA showed occlusion of the right MCA at the origin. As she had evidence of a large stroke on her CT, she was not deemed a candidate for intervention. She had an MRI on 10/4/2021 that confirmed a large, subacute infarct in the right MCA territory but did not demonstrate any cerebral edema or midline shift. She was getting ready to be discharged to a SNF but over the weekend she became more agitated and confused. There was concern that she might be in a non-convulsive status epilepticus and neurology recommended transfer. A CT was performed and demonstrated 7mm midline shift (previously 5mm). She was transferred to [facility] for further monitoring and management. History taken from chart review as patient unable to participate due to altered mental status.           Review of Systems:   Unable to complete d/t encephalopathy    Current Medications:    Current Facility-Administered Medications:     labetalol (NORMODYNE;TRANDATE) injection 10 mg, 10 mg, IntraVENous, Q4H PRN, Sri Escalona MD, 10 mg at 10/14/21 0146    hydrALAZINE (APRESOLINE) injection 10 mg, 10 mg, IntraVENous, Q6H PRN, Sri Escalona MD, 10 mg at 10/14/21 0254    enoxaparin (LOVENOX) injection 40 mg, 40 mg, SubCUTAneous, Daily, Sri Escalona MD, 40 mg at 10/14/21 0805    ondansetron (ZOFRAN-ODT) disintegrating tablet 4 mg, 4 mg, Oral, Q8H PRN **OR** ondansetron (ZOFRAN) injection 4 mg, 4 mg, IntraVENous, Q6H PRN, Sri Escalona MD    polyethylene glycol (GLYCOLAX) packet 17 g, 17 g, Oral, Daily PRN, Desirae Long MD    acetaminophen (TYLENOL) tablet 650 mg, 650 mg, Oral, Q6H PRN, 650 mg at 10/14/21 2046 **OR** acetaminophen (TYLENOL) suppository 650 mg, 650 mg, Rectal, Q6H PRN, Desirae oLng MD    famotidine (PEPCID) injection 20 mg, 20 mg, IntraVENous, Daily, Desirae Long MD, 20 mg at 10/14/21 0852    amLODIPine (NORVASC) tablet 10 mg, 10 mg, Oral, Daily, Desirae Long MD    carvedilol (COREG) tablet 12.5 mg, 12.5 mg, Oral, BID , Desirae Long MD, 12.5 mg at 10/14/21 1649    atorvastatin (LIPITOR) tablet 80 mg, 80 mg, Oral, Nightly, Desirae Long MD, 80 mg at 10/14/21 2224    aspirin chewable tablet 81 mg, 81 mg, Oral, Daily, Desirae Long MD    insulin lispro (1 Unit Dial) 0-6 Units, 0-6 Units, SubCUTAneous, TID , Desirae Long MD, 1 Units at 10/12/21 1722    insulin lispro (1 Unit Dial) 0-3 Units, 0-3 Units, SubCUTAneous, Nightly, Desirae Long MD, 1 Units at 10/11/21 2304    glucose (GLUTOSE) 40 % oral gel 15 g, 15 g, Oral, PRN, Desirae Long MD    dextrose 50 % IV solution, 12.5 g, IntraVENous, PRN, Desirae Long MD    glucagon (rDNA) injection 1 mg, 1 mg, IntraMUSCular, PRN, Desirae Long MD    dextrose 5 % solution, 100 mL/hr, IntraVENous, PRN, Desirae Long MD      Physical Exam  Constitutional  /74   Pulse 94   Temp 98.3 °F (36.8 °C) (Axillary)   Resp 18   Ht 5' 4.02\" (1.626 m)   Wt 139 lb 12.4 oz (63.4 kg)   SpO2 100%   BMI 23.98 kg/m²     General: Lethargic, ill appearing  Respiratory: Respirations unlabored, no accessory muscle use  Cardiovascular: Rate regular. Pulses palpable in all extremities  Psychiatric:     Neurologic  Mental status: lethargic. Eyes do not open to voice for me. Restless, moving R side spontaneously around bed. Not following commands for me this morning.       Cranial nerves:   CN2: left homonymous hemianopsia   CN 3,4,6: pupils equal and reactive to light, right gaze preference   CN5: exam limited by encephalopathy  CN7: left nasolabial flattening on rest and activation   CN8: hearing symmetric to voice     Motor Exam:  RUE: Strong throughout, does not follow commands  LUE: No movement to pain   RLE: Moving spontaneously, does not follow commands  LLE: withdraws to painful stim      Deep tendon reflexes:     R  L    Biceps  3+ 3+   Brachioradialis  3+ 3+   Patellar  3+ 3+   Toes down Up       Sensory: pain intact in all 4 extremities   Tone: increased in LUE           Images: All results below personally reviewed. Pertinent positives & negatives are addressed in Assessment & Plan section of note     MRI brain wo contrast, 10/11/2021:  1. Large, acute right MCA distribution infarct. This results in marked mass effect and right to left midline shift as well as subfalcine herniation intraventricular compression. Small amount of acute intracranial hemorrhage posteriorly involving the occipital/parietal lobe which likely reflects a combination of subarachnoid and parenchymal hemorrhage. 2. Occlusion of the proximal M1 segment of the right middle cerebral artery. 3. Background of white matter disease, consistent with chronic small vessel ischemic change. CT head wo contrast, 10/11/2021:  Large infarct on the right, with increased midline shift to the left. No obvious hemorrhage noted  Nodularity the posterior nasopharynx, similar to prior. CTA head and neck:   1. Occlusion of the right middle cerebral artery at the origin may be acute. Overall decreased size and number of enhancing right middle cerebral artery branches in comparison to the left. This correlates with findings of large acute/subacute right middle cerebral artery territory infarct. 2.  Short segment high-grade stenosis of a left middle cerebral artery M2 branch. Mild-to-moderate stenosis in the left middle cerebral artery M1 segment.   Diffuse atherosclerotic irregularity of the more distal left middle cerebral artery branches. 3.  The bilateral posterior cerebral arteries are diffusely small and not well seen, underlying disease is not excluded. 4.  No hemodynamically significant stenosis in the bilateral cervical internal carotid arteries per NASCET criteria. Bilateral carotid bulb atherosclerotic plaque. 5.  Patent bilateral vertebral arteries. MRI wo contrast, 10/4/2021:  Unchanged subacute right MCA infarct. Labs:  Sodium: 165 -> 165 -> 169      Lipid Panel:       Ref. Range 10/3/2021 05:46   Cholesterol, Total Latest Ref Range: 0 - 199 mg/dL 165   HDL Cholesterol Latest Ref Range: 40 - 60 mg/dL 33 (L)   LDL Calculated Latest Ref Range: <100 mg/dL 107 (H)   Triglycerides Latest Ref Range: 0 - 150 mg/dL 124   VLDL Cholesterol Calculated Latest Ref Range: Not Established mg/dL 25      Hemoglobin A1C: 5.7     Assessment:Kike Ellis is a 64year old woman with PMH GERD, obesity, tobacco abuse, HTN, HLD who was transferred to [facility] for altered mental status in the setting of a large right MCA stroke that happened prior to her admission on 10/2/2021. Unfortunately, she was not within the time window for tpa or mechanical thrombectomy when she initially presented with stroke. Neurosurgery is going to see her but hemicraniectomy for malignant cerebral edema is effective within 48 hours of ischemic insult and she is far outside that window.      Plan:  - Q4 hour neuro checks  - NO dextrose containing fluids  - If Sodium levels continue to rise, can reduce gently using 0.9% normal saline or 0.45% normal saline to high normal goal, will consult nephrology for further management  - PT/OT/ST  - Continue aspirin  - Delirium Precautions: Maintain a regular night-day/sleep-wake cycle: discourage daytime naps, re-orient frequently, shades up during the daytime, family at bedside as often as possible, minimize nighttime awakenings, utilize relaxation channel on television   - Continue atorvastatin 80 mg PO daily  - HOB at 30 degrees, head neutral  - Palliative following, appreciate recs  - Continue Q4 hour sodium checks  - DVT prophylaxis      RONNELL Gonzalez-CNP  Neurology & Neurocritical Care   Neurology Line: [phone]  [employer]: [facility] NPs  10/15/21

## 2021-10-15 NOTE — PROGRESS NOTES
Speech Language Pathology    Chart reviewed and d/w RN. Attempted to see pt for dysphagia tx. Pt sleeping soundly and unresponsive to all stimuli, including ice chip on lips. Sonorous respirations noted. Will re-attempt as pt alertness and schedule permits. Notified RN.     Muriel Jiménez MA CCC-SLP; [id number]  Speech-Language Pathologist  Pager # [phone]  Phone # [phone]  Office # [phone]

## 2021-10-16 NOTE — PROCEDURES
Briana Henley is a 64 y.o. female patient. 1. Brain herniation ([facility] [address]    2. Acute CVA (cerebrovascular accident) ([facility] [address]    3. Hypertensive emergency      Past Medical History:   Diagnosis Date    GERD (gastroesophageal reflux disease)     HLD (hyperlipidemia)     Hypertension     Stroke ([facility] [address] 10/02/2021    R MCA     Blood pressure (!) 113/96, pulse 89, temperature 99 °F (37.2 °C), temperature source Oral, resp. rate 19, height 5' 4.02\" (1.626 m), weight 139 lb 12.4 oz (63.4 kg), SpO2 100 %. EEG awake and asleep    Date/Time: 10/16/2021 1:06 PM  Performed by: Wilder Schneider MD  Authorized by: Ila Trinidad MD       CLINICAL HISTORY:  Patient is a 63 yo woman whi [facility] She had deterioration of her mental status and CT showed worsening cerebral edema. She was transferred to [facility] for further management. She did not require surgery. Her mental status has not improved. She is not following commands, not speaking, continually restless. Clinical concern is for complex partial or simple partial seizures or subclinical status epilepticus. FINDINGS: This is a routine 16 channel referential and bipolar video EEG. There is a background rhythm in the theta range, with more slowing in the right hemispheric leads consistent with her stroke. There is not a a posterior dominant pattern. Photic stimulation is performed without driving or abnormality. Hyperventilation is not able to be performed. No epileptiform abnormalities are noted. No electrographic seizures are recorded. IMPRESSION:  This is an abnormal awake and drowsy EEG due to the presence of generalized slowing, with more slowing in the right hemisphere consistent with her stroke. Generalized background abnormalities are indicative of an underlying diffuse encephalopathy of nonspecific etiology. No epileptiform abnormalities.       Wilder Schneider MD  10/16/2021

## 2021-10-16 NOTE — PROGRESS NOTES
Hospitalist Progress Note      PCP: Juanjose Hurst APRN - CNP    Date of Admission: 10/11/2021    Chief Complaint: Altered mental status    Hospital Course: 64year old Female with a PmHx of HTN, HLD, GERD, obesity, tobacco use who presented with one day of left-sided vision changes, fine motor deficits (dropping objects) in the L hand, and LUE paresthesia. Transferred for worsening lethargy and disorientation. Subjective: No acute events reported overnight. Patient remains encephalopathic. No overall improvement in mental status. Medications:  Reviewed    Infusion Medications    sodium chloride      dextrose       Scheduled Medications    enoxaparin  40 mg SubCUTAneous Daily    famotidine (PEPCID) injection  20 mg IntraVENous Daily    amLODIPine  10 mg Oral Daily    carvedilol  12.5 mg Oral BID WC    atorvastatin  80 mg Oral Nightly    aspirin  81 mg Oral Daily    insulin lispro  0-6 Units SubCUTAneous TID WC    insulin lispro  0-3 Units SubCUTAneous Nightly     PRN Meds: labetalol, hydrALAZINE, ondansetron **OR** ondansetron, polyethylene glycol, acetaminophen **OR** acetaminophen, glucose, dextrose, glucagon (rDNA), dextrose      Intake/Output Summary (Last 24 hours) at 10/16/2021 1439  Last data filed at 10/16/2021 1158  Gross per 24 hour   Intake 60 ml   Output 400 ml   Net -340 ml       Physical Exam Performed:    BP (!) 113/96   Pulse 89   Temp 99 °F (37.2 °C) (Oral)   Resp 19   Ht 5' 4.02\" (1.626 m)   Wt 139 lb 12.4 oz (63.4 kg)   SpO2 100%   BMI 23.98 kg/m²     General appearance: Encephalopathic, drowsy  HEENT: Pupils equal, round, and reactive to light. Conjunctivae/corneas clear. Neck: Supple, with full range of motion. No jugular venous distention. Trachea midline. Respiratory:  Normal respiratory effort. Clear to auscultation, bilaterally without Rales/Wheezes/Rhonchi.   Cardiovascular: Regular rate and rhythm with normal S1/S2 without murmurs, rubs or gallops. Abdomen: Soft, non-tender, non-distended with normal bowel sounds. Musculoskeletal: No clubbing, cyanosis or edema bilaterally. Full range of motion without deformity. Skin: Skin color, texture, turgor normal.  No rashes or lesions. Neurologic: Unable to assess today  Psychiatric: Encephalopathic,   Capillary Refill: Brisk,3 seconds, normal   Peripheral Pulses: +2 palpable, equal bilaterally       Labs:   Recent Labs     10/14/21  0450 10/15/21  0500 10/16/21  0610   WBC 16.3* 13.5* 19.9*   HGB 8.4* 7.8* 8.3*   HCT 25.2* 23.8* 25.5*    387 385     Recent Labs     10/14/21  0450 10/14/21  0832 10/15/21  0500 10/15/21  0847 10/15/21  1939 10/16/21  0256 10/16/21  0610   *   < > 165*   < > 167* 167* 168*   K 3.8  --  3.7  --   --   --  4.1   *  --  131*  --   --   --  133*   CO2 22  --  22  --   --   --  21   BUN 30*  --  40*  --   --   --  52*   CREATININE 1.0  --  1.3*  --   --   --  1.8*   CALCIUM 9.5  --  9.4  --   --   --  9.1    < > = values in this interval not displayed. No results for input(s): AST, ALT, BILIDIR, BILITOT, ALKPHOS in the last 72 hours. No results for input(s): INR in the last 72 hours. No results for input(s): Bibi Highman in the last 72 hours. Urinalysis:      Lab Results   Component Value Date    NITRU Negative 10/09/2021    WBCUA 6-9 10/06/2021    BACTERIA Rare 10/06/2021    RBCUA 5-10 10/06/2021    BLOODU Negative 10/09/2021    SPECGRAV 1.025 10/09/2021    GLUCOSEU Negative 10/09/2021       Radiology:  XR ABDOMEN (KUB) (SINGLE AP VIEW)   Final Result      Nonspecific bowel gas pattern. CT HEAD WO CONTRAST   Final Result      Extensive edema seen throughout the right MCA distribution compatible with large infarct. Associated mass effect is seen with mild right to left midline shift of the ventricles. No definite acute hemorrhage identified. MRI BRAIN W WO CONTRAST   Final Result      1.  Large, acute right MCA distribution infarct. This results in marked mass effect and right to left midline shift as well as subfalcine herniation intraventricular compression. Small amount of acute intracranial hemorrhage posteriorly involving the    occipital/parietal lobe which likely reflects a combination of subarachnoid and parenchymal hemorrhage. 2. Occlusion of the proximal M1 segment of the right middle cerebral artery. 3. Background of white matter disease, consistent with chronic small vessel ischemic change. Assessment/Plan:    Active Hospital Problems    Diagnosis     Acute CVA (cerebrovascular accident) ([facility] [address] [I63.9]      Large right middle cerebral artery ischemic CVA  Subarachnoid hemorrhage and intraparenchymal hemorrhagic conversion  Acute encephalopathy  Vasogenic edema with mass-effect  -Neurology, Neurosurgery following  -Continue aspirin, Lipitor  -MRI of the brain revealed increased mass-effect with right-to-left midline shift as well as subfalcine herniation with intraventricular compression  -Continue neurochecks  -Palliative care following, family is leaning towards pursuing with aggressive care. Will consult GI for possible PEG tube placement    Hypernatremia  -iatrogenic with hypertonic saline drip. 3% stopped. Continue to monitor. Cannot give D5 free water due to concern for brain edema  -Will start normal saline.   Nephrology consulted    Acute kidney injury  -Continue monitor creatinine  -Nephrology has been consulted    Hypertension  -Continue Norvasc, Coreg     DVT Prophylaxis: Lovenox  Diet: Diet NPO  Code Status: Full Code    PT/OT Eval Status: Pending    Dispo -awaiting improvement in mental status    Bhumi Floyd MD

## 2021-10-16 NOTE — PROGRESS NOTES
Critical value for sodium 166 down from 168 this morning. Will continue to monitor.   Electronically signed by Lonnie Pino RN on 10/16/2021 at 4:25 PM

## 2021-10-16 NOTE — CONSULTS
Nephrology Consult Note  [phone]  [phone]   [facility]. [employer]        Reason for consult:  hypernatremia      History of present illness:      Patient is a 64 y.o.  female admitted with Acute CVA (cerebrovascular accident) [facility]) [I63.9] who is seen in consult for hypernatremia    The patient has a significant past medical history of HTN, HLD, GERD, obesity, and tobacco use who presented to [facility] with left sided weakness, paresthesia and vision changes. A CT head on 10/11 showed large infarct on the right. She had reports of syncopal episodes while at [facility]. She had deteriorating neurologic status with repeat CT showing large infarct on the right. A brain MRI showed increased mass effect and right to left midline shift and patient was transferred to [facility]. Imaging showed subarachnoid hemorrhage and intraparenchymal hemorrhage with vasogenic edema. Seen by neurosurgery and duration of events suggested no surgical intervention appropriate. Repeat MRI of the brain revealed increased mass-effect with right-to-left midline shift as well as subfalcine herniation with intraventricular compression. Due to the cerebral edema, the patient was started on 3% saline with Na goal 145-155. Patient exceeded this level and Na has remained in the upper 160 range. Avoiding hypotonic Iv solutions due to the risk of worsening edema. Patient is unresponsive and unable to give me information. Creatinine has worsened from 1 to 1.8          Past Medical History:   Diagnosis Date    GERD (gastroesophageal reflux disease)     HLD (hyperlipidemia)     Hypertension     Stroke ([facility] [address] 10/02/2021    R MCA      Past Surgical History:   Procedure Laterality Date    CHOLECYSTECTOMY      HYSTERECTOMY          Admission Meds  No current facility-administered medications on file prior to encounter.      Current Outpatient Medications on File Prior to Encounter   Medication Sig Dispense Refill    losartan (COZAAR) 25 MG tablet Take 1 tablet by mouth daily 30 tablet 3    atorvastatin (LIPITOR) 80 MG tablet Take 1 tablet by mouth nightly 30 tablet 3    amLODIPine (NORVASC) 10 MG tablet Take 1 tablet by mouth daily 90 tablet 1    aspirin 81 MG chewable tablet Take 1 tablet by mouth daily 90 tablet 1    carvedilol (COREG) 12.5 MG tablet Take 1 tablet by mouth 2 times daily 180 tablet 1    omeprazole (PRILOSEC) 20 MG delayed release capsule Take 1 capsule by mouth daily 90 capsule 1         Allergies  Allergies   Allergen Reactions    Bee Pollen Swelling    Lisinopril Nausea Only    Varenicline Nausea And Vomiting        Social   Social History     Tobacco Use    Smoking status: Current Some Day Smoker     Packs/day: 0.50     Years: 40.00     Pack years: 20.00     Types: Cigarettes     Start date:     Smokeless tobacco: Never Used    Tobacco comment: only have smoked a couple cig since hospital   Substance Use Topics    Alcohol use: No        No family history on file. Review of Systems  Review of systems not obtained due to patient factors. Physical Exam:    VITALS:  /86   Pulse 89   Temp 98.7 °F (37.1 °C) (Axillary)   Resp 20   Ht 5' 4.02\" (1.626 m)   Wt 139 lb 12.4 oz (63.4 kg)   SpO2 94%   BMI 23.98 kg/m²   TEMPERATURE:  Current - Temp: 98.7 °F (37.1 °C); Max - Temp  Av.7 °F (37.6 °C)  Min: 98.7 °F (37.1 °C)  Max: 100.6 °F (38.1 °C)  RESPIRATIONS RANGE: Resp  Av.3  Min: 19  Max: 21  PULSE RANGE: Pulse  Av.1  Min: 83  Max: 96  BLOOD PRESSURE RANGE:  Systolic (85YFM), RZJ:674 , Min:120 , HMB:315   ; Diastolic (68MWB), AYH:82, Min:61, Max:86    PULSE OXIMETRY RANGE: SpO2  Av %  Min: 89 %  Max: 94 %  24HR INTAKE/OUTPUT:    Intake/Output Summary (Last 24 hours) at 10/16/2021 1103  Last data filed at 10/16/2021 0831  Gross per 24 hour   Intake 120 ml   Output 350 ml   Net -230 ml       Constitutional:  Obtunded, no repose to noxious stimuli  HEENT:  NGFT in place.  MM dry  Neck: no JVD  Lungs:  clear  Cardiovascular:  RRR, no murmur  Abd:  Soft, NT, BS+, no HSM  Ext:  no edema  Skin:  No lesions  Neuro: increased tone left arm and left leg. No interaction. No response noxious stimuli      DATA:    CBC:   Lab Results   Component Value Date    WBC 19.9 10/16/2021    RBC 2.77 10/16/2021    HGB 8.3 10/16/2021    HCT 25.5 10/16/2021    MCV 92.2 10/16/2021    MCH 29.8 10/16/2021    MCHC 32.4 10/16/2021    RDW 14.5 10/16/2021     10/16/2021    MPV 9.6 10/16/2021     CMP:    Lab Results   Component Value Date     10/16/2021    K 4.1 10/16/2021     10/16/2021    CO2 21 10/16/2021    BUN 52 10/16/2021    CREATININE 1.8 10/16/2021    GFRAA 35 10/16/2021    AGRATIO 1.2 10/04/2021    LABGLOM 29 10/16/2021    GLUCOSE 134 10/16/2021    PROT 7.4 10/04/2021    LABALBU 4.1 10/04/2021    CALCIUM 9.1 10/16/2021    BILITOT 1.5 10/04/2021    ALKPHOS 87 10/04/2021    AST 17 10/04/2021    ALT 7 10/04/2021     Magnesium:    Lab Results   Component Value Date    MG 1.60 10/05/2021     Phosphorus:    Lab Results   Component Value Date    PHOS 3.6 03/03/2021     Uric Acid:  No results found for: LABURIC, URICACID  PT/INR:    Lab Results   Component Value Date    PROTIME 12.1 10/02/2021    INR 1.07 10/02/2021       IMPRESSION:      1. Hypernatremia - iatrogenic with 3% saline  Given clinical appearance and higher creatinine, suspect intravascular volume contraction as well. The 3% saline could have induced an osmotic diuresis  Want to avoid hypotonic IVF but needs additional volume  The situation will not correct without some free water: can give via GI tract safely  - agree with NS acutely (Na 154 meq/l compared to current 168)  - may change to LR soon if no improvement  - eventually might convert to 1/2 NS but not yet  - use water via FT, cautious  - once Na less than 160, stop fluids and water  2. COY - relative volume contraction  3.  Right hemispheric acute ischemic stroke with hemorrhagic conversion  Timing of events preclude neurosurgical intervention  4. Cerebral  edema - maintain hypertonic state but not this severe  5. HTN - controlled  6.  Anemia - check iron    Plan:  · NS initially  · Water via  ml q4h (600 ml/d)  · Continue serial Na  · Stop fluids and water if Na less than 160  · Urine studies    Selin Coombs MD

## 2021-10-16 NOTE — PLAN OF CARE
Problem: Non-Violent Restraints  Goal: Removal from restraints as soon as assessed to be safe  10/16/2021 1949 by Levon Barney RN  Outcome: Ongoing  Note: Pt is nonverbal, unable to assess orientation  and not following commands. Pt  has right Femoral triple lumen, [place], tele leads. Pt is still continuously attempting to pull at lines and thrown right over side of the bed. Pt still meets criteria for right wrist restraint despite attempts to reorient patient and disguise equipment. Will continue to monitor. Problem: Falls - Risk of:  Goal: Will remain free from falls  Description: Will remain free from falls  10/16/2021 1949 by Levon Barney RN  Outcome: Ongoing  Note: Pt is a high fall risk. Bed is lowest position, sign at door, nonskid socks on, bracelet on.

## 2021-10-16 NOTE — PLAN OF CARE
Problem: Non-Violent Restraints  Goal: No harm/injury to patient while restraints in use  Outcome: Met This Shift  Note: Pt was assessed q2h with R soft wrist restraint on. ROM was performed on pt and checked for circulation. Pt still shows a need for continued restraints. Will continue to monitor and update MD as needed. Problem: Falls - Risk of:  Goal: Will remain free from falls  Description: Will remain free from falls  10/16/2021 0732 by Adalgisa Harden RN  Outcome: Met This Shift  Note: No falls noted thus far this shift, bed in lowest position, alarm on, non-skid socks on, call light within reach, hourly checks, safety maintained, will continue to monitor. Call light within reach. Problem: Skin Integrity:  Goal: Will show no infection signs and symptoms  Description: Will show no infection signs and symptoms  Outcome: Met This Shift     Problem: Non-Violent Restraints  Goal: Removal from restraints as soon as assessed to be safe  Outcome: Ongoing  Note: Pt remains in R soft wrist restraint d/t pt pulling on lines and drains. Pt still shows need for continued use of restraints. ROM and q2h checks/circulation was assessed. Will continue to monitor and update MD as needed.       Problem: HEMODYNAMIC STATUS  Goal: Patient has stable vital signs and fluid balance  10/16/2021 0732 by Adalgisa Harden RN  Outcome: Not Met This Shift     Problem: COMMUNICATION IMPAIRMENT  Goal: Ability to express needs and understand communication  Outcome: Not Met This Shift

## 2021-10-17 NOTE — PROGRESS NOTES
Pt is on normal saline at 100ml for cerebral edema. She was on 11 liters of oxygen at the beginning of this shift. O2 weaned down to 8 liters until few minutes ago as her O2 sat drops into the 80's. She is currently on 14 liters with O2 sat barely  > 90%, and her lungs now have audible crackles. Hospiltalist notified.  Verbal order given to hold the Normal saline  until AM. Electronically signed by Jose Girard RN on 10/17/2021 at 1:10 AM

## 2021-10-17 NOTE — PROGRESS NOTES
Brother at bedside throughout afternoon, then son and wife after time of death. States they want mother to go to [facility] [phone].    Electronically signed by tAul Claudio RN on 10/17/2021 at 5:52 PM

## 2021-10-17 NOTE — DEATH NOTES
Death Pronouncement Note  Patient's Name: Sukh Minor   Patient's YOB: 1960  MRN Number: [id number]    Admitting Provider: Christiano Bowers MD  Attending Provider: Christiano Bowers MD    Patient was examined and the following were absent: Pulses, Blood Pressure and Respiratory effort    I declared the patient dead on 10/17/2021 at 2:58 PM    Preliminary Cause of Death: Respiratory arrest     Electronically signed by Briana Loza MD on 10/17/21 at 4:45 PM EDT

## 2021-10-17 NOTE — SIGNIFICANT EVENT
Rapid response was called at 12:21 PM. Pt desaturated to 80s during welch placement. ICU team came to bedside to evaluate the pt. HR 80s, BP 97/70s, satting at 100% on NRB and high flow nasal cannula. Saturations had improved after sitting the pt up in bed. Pt noted to have wheezing on physical exam, ordered Duonebs. CXR, EKG, pro-BNP, ABG obtained, blood cultures, and respiratory culture obtained. Discussed with Dr. Shekhar Singh and will order CT head. Will continue to monitor the pt and f/u with labs.      Dejon Borjas, , PGY-1

## 2021-10-17 NOTE — PROGRESS NOTES
Nephrology Consult Note  [phone]   [facility]. [employer]          CC: We are following this patient for hypernatremia and COY. Admitted for CVA    significant past medical history of HTN, HLD, GERD, obesity, and tobacco use who presented to [facility] with left sided weakness, paresthesia and vision changes. A CT head on 10/11 showed large infarct on the right. She had reports of syncopal episodes while at [facility]. She had deteriorating neurologic status with repeat CT showing large infarct on the right. A brain MRI showed increased mass effect and right to left midline shift and patient was transferred to [facility]. Imaging showed subarachnoid hemorrhage and intraparenchymal hemorrhage with vasogenic edema. Seen by neurosurgery and duration of events suggested no surgical intervention appropriate. Repeat MRI of the brain revealed increased mass-effect with right-to-left midline shift as well as subfalcine herniation with intraventricular compression.     Due to the cerebral edema, the patient was started on 3% saline with Na goal 145-155. Patient exceeded this level and Na has remained in the upper 160 range. Creatinine has worsened fas well. SUBJECTIVE:    Had report of problems with rhonchi and concern for pulmonary edema this morning - given dose of lasix. Na worse - changed fluids to 1/2 NS  Remains unresponsive  Sending urine studies today but had lasix before they were sent    ROS - unable    Spoke with son by phone         Physical Exam:    VITALS:  /64   Pulse 88   Temp 98.4 °F (36.9 °C) (Axillary)   Resp 28   Ht 5' 4.02\" (1.626 m)   Wt 162 lb 7.7 oz (73.7 kg)   SpO2 90%   BMI 27.88 kg/m²   TEMPERATURE:  Current - Temp: 98.4 °F (36.9 °C);  Max - Temp  Av.5 °F (36.9 °C)  Min: 97.9 °F (36.6 °C)  Max: 99.8 °F (37.7 °C)  RESPIRATIONS RANGE: Resp  Av.2  Min: 20  Max: 28  PULSE RANGE: Pulse  Av.8  Min: 84  Max: 88  BLOOD PRESSURE RANGE:  Systolic (15KMW), OCC:680 , Min:102 , QLI:785   ; Diastolic (71XLY), ABK:40, Min:64, Max:81    PULSE OXIMETRY RANGE: SpO2  Av.9 %  Min: 90 %  Max: 100 %  24HR INTAKE/OUTPUT:    Intake/Output Summary (Last 24 hours) at 10/17/2021 1143  Last data filed at 10/17/2021 1135  Gross per 24 hour   Intake 0 ml   Output 450 ml   Net -450 ml       Constitutional:  Obtunded, no response to noxious stimuli, NAD  HEENT:  NGFT in place  Lungs:  No rales or rhonchi  Cardiovascular:  RRR, no murmur  Abd:  Soft, NT  Ext:  No edema  Skin:  No rash  Neuro: left side paralysis with increased tone, unresponsive      DATA:    CBC: Recent Labs     10/15/21  0500 10/16/21  0610 10/17/21  0642   WBC 13.5* 19.9* 21.7*   RBC 2.64* 2.77* 2.69*   HGB 7.8* 8.3* 7.8*   HCT 23.8* 25.5* 24.7*    385 360     BMP:    Recent Labs     10/15/21  0500 10/15/21  0847 10/16/21  0610 10/16/21  0610 10/16/21  1531 10/16/21  2130 10/17/21  0642   *   < > 168*   < > 166* 169* 171*   K 3.7  --  4.1  --   --   --  3.9   *  --  133*  --   --   --  137*   CO2 22  --  21  --   --   --  20*   BUN 40*  --  52*  --   --   --  74*   CREATININE 1.3*  --  1.8*  --   --   --  2.1*   CALCIUM 9.4  --  9.1  --   --   --  8.8   GLUCOSE 123*  --  134*  --   --   --  146*    < > = values in this interval not displayed. Phosphorus:  No results for input(s): PHOS in the last 72 hours. Magnesium:  No results for input(s): MG in the last 72 hours. Hepatic Function Panel:    Lab Results   Component Value Date    ALKPHOS 87 10/04/2021    ALT 7 10/04/2021    AST 17 10/04/2021    PROT 7.4 10/04/2021    BILITOT 1.5 10/04/2021    LABALBU 4.1 10/04/2021         IMPRESSION/RECOMMENDATIONS:      1. Hypernatremia - iatrogenic with 3% saline  The situation will not correct without some free water: can give via GI tract safely  Worsened.  Given lasix today for concern about respiratory status  Need more accurate measurement of U/O - use welch acutely for short term  Increase water via NG  Changed to 1/2 NS - increase rate  2. COY - likely related to hypertonic state  At risk for ATN  She would not do well with dialysis  I have spoken to son about this - no need KRT yet  3. Right hemispheric acute ischemic stroke with hemorrhagic conversion  Timing of events preclude neurosurgical intervention  4. Cerebral  edema - maintain hypertonic state but not this severe  5. HTN - BP lower  Reduce amlodipine to 5 mg  6. Anemia - check iron  7.  Elevated WBC - no fever  check urine for culture  Check CXR    Anahi Kingsley MD

## 2021-10-17 NOTE — PLAN OF CARE
Problem: Non-Violent Restraints  Goal: Removal from restraints as soon as assessed to be safe  10/17/2021 0308 by Merlyn Bui RN  Outcome: Ongoing  Pt continues to make attempts to pull the NG -tube out. Right Wrist Restraints still in place,  and pt is tolerating this with no injury noted. Will continue to monitor. Problem: Skin Integrity:  Goal: Will show no infection signs and symptoms  Description: Will show no infection signs and symptoms  Outcome: Ongoing    Pt with no new skin breakdown during this shift. Pt's skin assessed with  each shift assessment. Pt turned and repositioned as scheduled. Will continue to monitor. Problem: Falls - Risk of:  Goal: Will remain free from falls  Description: Will remain free from falls  10/17/2021 0308 by Merlyn Bui RN  Outcome: Ongoing    Pt remains without falls during this shift. Pt does not attempt to get OOB alone. Pt unable to call out appropriately, call light within reach. Safety precautions in place include fall armband, fall towel, chair & bed alarms, non slip foot wear. Signs posted on door, and door remains open for observation. The bed is in lowest position, wheels are locked, and rails are up 2/4. Will continue with current care.

## 2021-10-17 NOTE — PROGRESS NOTES
Pt son called this writer on the phone for update on his mother. All questions answered.  Electronically signed by Christian Brambila RN on 10/17/2021 at 7:30 AM

## 2021-10-17 NOTE — DISCHARGE SUMMARY
the left. Elevated WBC count, with negative chest XR and urinalysis     Concern at [facility] for non-convulsive SE given patient's reported deteriorating mental status. Transferred to [facility] for consideration of 3% saline infusion and/or continuous EEG. 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 Cloteal Rosas MRI of the brain revealed increased mass-effect with right-to-left midline shift as well as subfalcine herniation with intraventricular compression. Neurology and neurosurgery were consulted. Patient was started on 3% saline for therapeutic hyper natremia. Patient mental status continued to remain poor during her hospital stay. Given her poor mental status no significant improvement, palliative care was consulted. Pt also suffered COY while in the hospital and her Na went upto 170. Nephrologyy was consulted for management. Pt continued to deteriorate and had another stroke involving the left occipital lobe infarct presumably related to PCA distribution infarct.   Her respiratory status also deteriorated and ultimately the patient passed away on 10/17/2021 at 2:58 PM      Discharge Medications:   Current Discharge Medication List        Current Discharge Medication List        Current Discharge Medication List      CONTINUE these medications which have NOT CHANGED    Details   losartan (COZAAR) 25 MG tablet Take 1 tablet by mouth daily  Qty: 30 tablet, Refills: 3      atorvastatin (LIPITOR) 80 MG tablet Take 1 tablet by mouth nightly  Qty: 30 tablet, Refills: 3      amLODIPine (NORVASC) 10 MG tablet Take 1 tablet by mouth daily  Qty: 90 tablet, Refills: 1    Associated Diagnoses: HTN (hypertension), benign evaluate for patent foramen ovale. A bubble study was performed and fails to show evidence of shunting. Signature   ------------------------------------------------------------------  Electronically signed by Clifton Culp MD, [facility]  (Interpreting physician) on 10/04/2021 at 11:23 AM  ------------------------------------------------------------------   Findings   Left Atrium  A bubble study was performed and fails to show evidence of shunting. Miscellaneous  Limited bubble study was performed to evaluate for patent foramen ovale. XR ABDOMEN (KUB) (SINGLE AP VIEW)    Result Date: 10/14/2021  AP ABDOMEN HISTORY: NG tube placement FINDINGS: Bowel gas pattern is nonspecific. Corpak tube is seen with tip extending to left upper quadrant likely within gastric fundus. If concern of obstruction or free air, upright view should be included. Nonspecific bowel gas pattern. CT HEAD WO CONTRAST    Result Date: 10/17/2021  HISTORY: Stroke. Altered mental status. EXAM : CT OF THE HEAD WITHOUT CONTRAST TECHNIQUE: Multiple axial images were obtained of the brain without the use of contrast. Individualized dose optimization technique was used in order to meet ALARA standards for radiation dose reduction. In addition to vendor specific dose reduction algorithms, the dose reduction techniques vary based on the specific scanner utilized but frequently include automated exposure control, adjustment of the mA and/or kV according to patient size, and use of iterative reconstruction technique. COMPARISON: 10/14/2021. FINDINGS: The visualized paranasal sinuses, mastoid air cells and middle ears are clear to the extent visualized. The orbits and osseous structures are unremarkable. Intracranially, there is a large right MCA distribution infarct involving much of the right frontal, right temporal, right occipital lobes as well as the right parietal lobe. associated edema and significant midline shift.  Midline shift appears worsened in comparison to previous exam. There is a new area of hypodensity in the medial left occipital lobe compatible with an area of infarction. No evidence of hemorrhagic conversion. Small probable meningioma over the superior right frontal region which is difficult to visualize on previous limited MRI exam.     1. New left occipital lobe infarct presumably related to PCA distribution infarct. 2. Very large right MCA distribution infarct with extensive edema and slight midline shift increased in comparison to previous exam. Focal areas of petechial hemorrhage appears similar to prior exam. No evidence of hemorrhagic conversion otherwise. CT HEAD WO CONTRAST    Result Date: 10/14/2021  CT HEAD WITHOUT CONTRAST HISTORY: Follow-up edema COMPARISON: MRI from 10/11/2021 Underexposure controls were utilized during the CT examination to meet ALARA standards for radiation dose reduction. FINDINGS: The ventricles are normal in size and configuration with no midline shift or mass effect. There is no evidence of intracranial hemorrhage or abnormal extra-axial fluid collection. Extensive area of low-attenuation is seen throughout the right MCA distribution consistent with extensive infarct. There is associated mass effect resulting from the extensive edema with right to left shift of the ventricles of approximately 7 mm. There is mass effect on the basilar structures but no significant effacement of the basilar cisterns. Visualized portions of the paranasal sinuses appear clear. Extensive edema seen throughout the right MCA distribution compatible with large infarct. Associated mass effect is seen with mild right to left midline shift of the ventricles. No definite acute hemorrhage identified.      CT HEAD WO CONTRAST    Result Date: 10/11/2021  EXAMINATION: CT OF THE HEAD WITHOUT CONTRAST  10/11/2021 10:00 am TECHNIQUE: CT of the head was performed without the administration of intravenous contrast. Dose modulation, iterative reconstruction, and/or weight based adjustment of the mA/kV was utilized to reduce the radiation dose to as low as reasonably achievable. COMPARISON: 10/06/2021 HISTORY: ORDERING SYSTEM PROVIDED HISTORY: recent right MCA stroke, now with increased lethargy TECHNOLOGIST PROVIDED HISTORY: Reason for exam:->recent right MCA stroke, now with increased lethargy Has a \"code stroke\" or \"stroke alert\" been called? ->No Reason for Exam: recent CVA, increased lethargy; pt unable to give hx Acuity: Acute Type of Exam: Initial FINDINGS: BRAIN/VENTRICLES: Large area of ill-defined hypodensity is seen in the right cerebral hemisphere involving the right frontal, temporal, parietal, and occipital lobe, compatible with changes from recent infarct. There is mass effect on the right lateral ventricle, increased compared to prior. There is midline shift to the left by 7 mm, previously 5 mm Periventricular hypodensity is seen No obvious hemorrhage noted. There is intracranial atherosclerosis. . ORBITS: The visualized portion of the orbits demonstrate no acute abnormality. SINUSES: No significant mastoid opacification noted. There is bowing and spurring of the nasal septum. No air-fluid level seen in the sinuses. SOFT TISSUES/SKULL:  No acute abnormality of the visualized skull or soft tissues. .  There is nodularity in the posterior nasopharynx, similar to prior     Large infarct on the right, with increased midline shift to the left. No obvious hemorrhage noted Nodularity the posterior nasopharynx, similar to prior. CT HEAD WO CONTRAST    Result Date: 10/6/2021  EXAMINATION: CT OF THE HEAD WITHOUT CONTRAST  10/6/2021 10:49 am TECHNIQUE: CT of the head was performed without the administration of intravenous contrast. Dose modulation, iterative reconstruction, and/or weight based adjustment of the mA/kV was utilized to reduce the radiation dose to as low as reasonably achievable.  COMPARISON: 10/02/2021 HISTORY: ORDERING SYSTEM PROVIDED HISTORY: mental status change TECHNOLOGIST PROVIDED HISTORY: Reason for exam:->mental status change Has a \"code stroke\" or \"stroke alert\" been called? ->No Reason for Exam: syncope while using restroom last night and this am Acuity: Acute Type of Exam: Initial Relevant Medical/Surgical History: hx of stroke FINDINGS: Motion artifact degrades the study. BRAIN/VENTRICLES: Large ill-defined area of hypodensity involving the right cerebral hemisphere is redemonstrated, with mild sulcal effacement, compatible with aging or evolution of previously described infarct. There is mass effect on the right lateral ventricle. There is midline shift to the left by approximately 5 mm, previously 2-3 mm. Mild-to-moderate periventricular hypodensity is seen, similar to prior. No obvious hemorrhage. There is intracranial atherosclerosis. . ORBITS: The visualized portion of the orbits demonstrate no acute abnormality. SINUSES: There is bowing and spurring of the nasal septum. Trace mucosal thickening is seen in the right maxillary sinus. .  Trace trace fluid is seen in the mastoid air cells bilaterally. Era [employer] SOFT TISSUES/SKULL:  No acute abnormality of the visualized skull or soft tissues. . Nodular thickening in the posterior nasopharynx is redemonstrated. Aging or evolving infarct on the right, with slight increase in midline shift to the left. No obvious hemorrhage noted. Nodularity posterior nasopharynx, similar to prior. CT HEAD WO CONTRAST    Result Date: 10/2/2021  EXAMINATION: CT OF THE HEAD WITHOUT CONTRAST  10/2/2021 7:17 pm TECHNIQUE: CT of the head was performed without the administration of intravenous contrast. Dose modulation, iterative reconstruction, and/or weight based adjustment of the mA/kV was utilized to reduce the radiation dose to as low as reasonably achievable. COMPARISON: March 1, 2021.  HISTORY: ORDERING SYSTEM PROVIDED HISTORY: left visual field change TECHNOLOGIST PROVIDED HISTORY: If patient is on cardiac monitor and/or pulse ox, they may be taken off cardiac monitor and pulse ox, left on O2 if currently on. All monitors reattached when patient returns to room. Has a \"code stroke\" or \"stroke alert\" been called? ->Yes Reason for exam:->left visual field change Decision Support Exception - unselect if not a suspected or confirmed emergency medical condition->Emergency Medical Condition (MA) FINDINGS: BRAIN/VENTRICLES: Large acute/subacute stroke in the right middle cerebral artery territory with hypoattenuation and loss of the gray-white matter interface in the right frontal, superior temporal, parietal and occipital lobes as well as the right insula. Sulcal effacement throughout the right cerebral hemisphere. Minimal right left midline shift measures 2 mm at the level of the lateral ventricles. Partial effacement of the right lateral and 3rd ventricles. No hydrocephalus. The basal cisterns are patent. No acute intracranial hemorrhage. Bilateral patchy ill-defined periventricular white matter hypoattenuation. Left basal ganglia and thalamic old lacunar type infarcts. Atherosclerosis. ORBITS: The visualized portion of the orbits demonstrate no acute abnormality. SINUSES: The visualized paranasal sinuses and mastoid air cells demonstrate no acute abnormality. SOFT TISSUES/SKULL:  No acute abnormality of the visualized skull or soft tissues. 1.  Large acute/subacute stroke in the right middle cerebral artery territory. No hemorrhagic conversion. 2.  Sulcal effacement throughout the right cerebral hemisphere with partial effacement of the right lateral and 3rd ventricles and minimal right left midline shift measuring 2 mm. 3.  Chronic small vessel ischemic disease. Old left thalamic and basal ganglia lacunar infarcts are similar to the prior study. Critical results were called by Dr. Abraham Diallo Sessions to Dr. Amilcar Miner on 10/2/2021 at 19:29.      XR CHEST PORTABLE    Result Date: 10/17/2021  History: Leukocytosis. Possible aspiration. AP chest. FINDINGS: Feeding tube identified in the proximal stomach. Bilateral basilar opacities suggesting atelectasis or early pneumonia. No acute osseous abnormality. No pneumothorax. 1. Patchy basilar opacities bilaterally suspicious for pneumonia versus atelectasis. XR CHEST PORTABLE    Result Date: 10/11/2021  EXAMINATION: ONE XRAY VIEW OF THE CHEST 10/11/2021 10:40 am COMPARISON: Prior study(s) most recent 10/02/2021. HISTORY: ORDERING SYSTEM PROVIDED HISTORY: leukocytosis and lethargy after stroke. any aspiration pna? TECHNOLOGIST PROVIDED HISTORY: Reason for exam:->leukocytosis and lethargy after stroke. any aspiration pna? Reason for Exam: leukocytosis and lethargy after stroke. possible aspiration pna Acuity: Acute Type of Exam: Subsequent/Follow-up FINDINGS: The heart is within normal limits size. Pulmonary vessels are normal.  Lung volume is low. No acute airspace disease, pleural effusion or pneumothorax. No acute cardiac or pulmonary disease. XR CHEST PORTABLE    Result Date: 10/2/2021  EXAMINATION: ONE XRAY VIEW OF THE CHEST 10/2/2021 7:09 pm COMPARISON: March 2 2021 HISTORY: ORDERING SYSTEM PROVIDED HISTORY: left visual field change TECHNOLOGIST PROVIDED HISTORY: Reason for exam:->left visual field change Reason for Exam: left visual field change, code stroke Acuity: Acute Type of Exam: Initial FINDINGS: The cardiomediastinal silhouette is not enlarged. No pleural effusion or pneumothorax. No focal consolidation. No acute cardiopulmonary abnormality. CTA HEAD NECK W CONTRAST    Result Date: 10/2/2021  EXAMINATION: CTA OF THE HEAD AND NECK WITH CONTRAST 10/2/2021 7:27 pm: TECHNIQUE: CTA of the head and neck was performed with the administration of intravenous contrast. Multiplanar reformatted images are provided for review. MIP images are provided for review.  Stenosis of the internal carotid arteries measured using NASCET criteria. Dose modulation, iterative reconstruction, and/or weight based adjustment of the mA/kV was utilized to reduce the radiation dose to as low as reasonably achievable. COMPARISON: March 1, 2021. HISTORY: ORDERING SYSTEM PROVIDED HISTORY: left visual field change TECHNOLOGIST PROVIDED HISTORY: Reason for exam:->left visual field change Decision Support Exception - unselect if not a suspected or confirmed emergency medical condition->Emergency Medical Condition (MA) Reason for Exam: left visual field change Acuity: Acute Type of Exam: Initial FINDINGS: CTA NECK: AORTIC ARCH/ARCH VESSELS: No dissection or arterial injury. No significant stenosis of the brachiocephalic or subclavian arteries. Atherosclerosis in the visualized thoracic aorta. CAROTID ARTERIES: No dissection, arterial injury, or hemodynamically significant stenosis by NASCET criteria. Atherosclerosis in the bilateral common carotid arteries, carotid bulbs and proximal cervical internal carotid arteries. VERTEBRAL ARTERIES: No dissection, arterial injury, or significant stenosis. SOFT TISSUES: The lung apices are clear. No cervical or superior mediastinal lymphadenopathy. The larynx and pharynx are unremarkable. No acute abnormality of the salivary glands. Heterogeneous thyroid gland. Prominent adenoids may be reactive. BONES: No acute osseous abnormality. CTA HEAD: ANTERIOR CIRCULATION: Atherosclerosis of the bilateral intracranial internal carotid arteries results in 25-50% stenosis bilaterally. Occlusion of the right middle cerebral artery at the origin with overall decreased size and number of enhancing right middle cerebral artery branches in comparison to the left. Mild-to-moderate stenosis in the left middle cerebral artery M1 segment. Short segment high-grade stenosis/occlusion of a left middle cerebral artery M2 branch.   Diffuse atherosclerotic irregularity of the more distal left middle cerebral artery branches. Atherosclerosis in the proximal bilateral anterior cerebral arteries without focal high-grade stenosis or occlusion. No anterior circulation aneurysm. POSTERIOR CIRCULATION: No significant stenosis of the vertebral and basilar arteries. No aneurysm. OTHER: No dural venous sinus thrombosis on this non-dedicated study. BRAIN: Large acute/subacute right middle cerebral artery territory infarct. 1.  Occlusion of the right middle cerebral artery at the origin may be acute. Overall decreased size and number of enhancing right middle cerebral artery branches in comparison to the left. This correlates with findings of large acute/subacute right middle cerebral artery territory infarct. 2.  Short segment high-grade stenosis of a left middle cerebral artery M2 branch. Mild-to-moderate stenosis in the left middle cerebral artery M1 segment. Diffuse atherosclerotic irregularity of the more distal left middle cerebral artery branches. 3.  The bilateral posterior cerebral arteries are diffusely small and not well seen, underlying disease is not excluded. 4.  No hemodynamically significant stenosis in the bilateral cervical internal carotid arteries per NASCET criteria. Bilateral carotid bulb atherosclerotic plaque. 5.  Patent bilateral vertebral arteries. Critical results were called by Dr. Jones Sessions to Dr. Jay Kemp on 10/2/2021 at 19:49. This scan was analyzed using [employer]. ai contact LVO. Identification of suspected findings is not for diagnostic use beyond notification. Viz LVO is limited to analysis of imaging data and should not be used in-lieu of full patient evaluation or relied upon to make or confirm diagnosis. MRI BRAIN W WO CONTRAST    Result Date: 10/11/2021  MRI the brain with and without contrast HISTORY: Infarct. COMPARISON: None FINDINGS: This examination is compromised by patient motion.  There is a very large infarct in the right cerebral hemisphere involving virtually the entire vascular territory of the right middle cerebral artery. There are some foci of hemorrhage noted posteriorly in the parietal/occipital lobe which likely reflect a combination of subarachnoid and parenchymal hemorrhage. Cytotoxic edema results in mass effect in the brain causing right to left midline shift measuring approximately 12 mm. Subfalcine herniation is evident. There is compression of the right lateral ventricle with slight entrapment of the temporal horn. There is loss of the flow void in the proximal right middle cerebral artery, in the M1 segment. There is a background of multifocal white matter disease. There are no significant areas of abnormal enhancement. The cisterns within the posterior fossa are patent. 1. Large, acute right MCA distribution infarct. This results in marked mass effect and right to left midline shift as well as subfalcine herniation intraventricular compression. Small amount of acute intracranial hemorrhage posteriorly involving the occipital/parietal lobe which likely reflects a combination of subarachnoid and parenchymal hemorrhage. 2. Occlusion of the proximal M1 segment of the right middle cerebral artery. 3. Background of white matter disease, consistent with chronic small vessel ischemic change. MRI BRAIN WO CONTRAST    Result Date: 10/8/2021  EXAMINATION: MRI OF THE BRAIN WITHOUT CONTRAST  10/4/2021 5:23 pm TECHNIQUE: Multiplanar multisequence MRI of the brain was performed without the administration of intravenous contrast. COMPARISON: 10/2/2021 HISTORY: ORDERING SYSTEM PROVIDED HISTORY: CVA TECHNOLOGIST PROVIDED HISTORY: Reason for exam:->CVA FINDINGS: INTRACRANIAL STRUCTURES/VENTRICLES: A large subacute infarct within the right middle cerebral artery territory is redemonstrated and unchanged in extent from the previous head CT.   It predominantly involves the portion of the brain around the confluence of the temporal and parietal lobes and the posteroinferior frontal lobe and insula. There is also involvement of the occipital pole. There is no acute hemorrhage, herniation, or hydrocephalus. ORBITS: The visualized portion of the orbits demonstrate no acute abnormality. SINUSES: The visualized paranasal sinuses and mastoid air cells are well aerated. BONES/SOFT TISSUES: The bone marrow signal intensity appears normal. The soft tissues demonstrate no acute abnormality. Unchanged subacute right MCA infarct.            Last Labs on Discharge:     Recent Results (from the past 24 hour(s))   POCT Glucose    Collection Time: 10/16/21  9:23 PM   Result Value Ref Range    POC Glucose 153 (H) 70 - 99 mg/dl    Performed on ACCU-CHEK    Sodium    Collection Time: 10/16/21  9:30 PM   Result Value Ref Range    Sodium 169 (HH) 136 - 145 mmol/L   Basic Metabolic Panel w/ Reflex to MG    Collection Time: 10/17/21  6:42 AM   Result Value Ref Range    Sodium 171 (HH) 136 - 145 mmol/L    Potassium reflex Magnesium 3.9 3.5 - 5.1 mmol/L    Chloride 137 (H) 99 - 110 mmol/L    CO2 20 (L) 21 - 32 mmol/L    Anion Gap 14 3 - 16    Glucose 146 (H) 70 - 99 mg/dL    BUN 74 (H) 7 - 20 mg/dL    CREATININE 2.1 (H) 0.6 - 1.2 mg/dL    GFR Non-African American 24 (A) >60    GFR  29 (A) >60    Calcium 8.8 8.3 - 10.6 mg/dL   CBC auto differential    Collection Time: 10/17/21  6:42 AM   Result Value Ref Range    WBC 21.7 (H) 4.0 - 11.0 K/uL    RBC 2.69 (L) 4.00 - 5.20 M/uL    Hemoglobin 7.8 (L) 12.0 - 16.0 g/dL    Hematocrit 24.7 (L) 36.0 - 48.0 %    MCV 91.9 80.0 - 100.0 fL    MCH 29.0 26.0 - 34.0 pg    MCHC 31.6 31.0 - 36.0 g/dL    RDW 14.3 12.4 - 15.4 %    Platelets 527 537 - 125 K/uL    MPV 9.9 5.0 - 10.5 fL    Neutrophils % 85.5 %    Lymphocytes % 8.9 %    Monocytes % 5.5 %    Eosinophils % 0.0 %    Basophils % 0.1 %    Neutrophils Absolute 18.5 (H) 1.7 - 7.7 K/uL    Lymphocytes Absolute 1.9 1.0 - 5.1 K/uL    Monocytes Absolute 1.2 0.0 - 1.3 K/uL    Eosinophils Absolute 0.0 0.0 - 0.6 K/uL    Basophils Absolute 0.0 0.0 - 0.2 K/uL   Ferritin    Collection Time: 10/17/21  6:42 AM   Result Value Ref Range    Ferritin 607.2 (H) 15.0 - 150.0 ng/mL   POCT Glucose    Collection Time: 10/17/21  7:38 AM   Result Value Ref Range    POC Glucose 138 (H) 70 - 99 mg/dl    Performed on ACCU-CHEK    Creatinine, Random Urine    Collection Time: 10/17/21 11:51 AM   Result Value Ref Range    Creatinine, Ur 98.2 28.0 - 259.0 mg/dL   Protein, urine, random    Collection Time: 10/17/21 11:51 AM   Result Value Ref Range    Protein, Ur 13.70 (H) <12 mg/dL   Osmolality    Collection Time: 10/17/21 11:51 AM   Result Value Ref Range    Osmolality 580 (H) 278 - 305 mOsm/kg   Brain Natriuretic Peptide    Collection Time: 10/17/21 12:21 PM   Result Value Ref Range    Pro- (H) 0 - 124 pg/mL   Urinalysis with microscopic    Collection Time: 10/17/21 12:39 PM   Result Value Ref Range    Color, UA Yellow Straw/Yellow    Clarity, UA Clear Clear    Glucose, Ur Negative Negative mg/dL    Bilirubin Urine Negative Negative    Ketones, Urine Negative Negative mg/dL    Specific Gravity, UA 1.020 1.005 - 1.030    Blood, Urine LARGE (A) Negative    pH, UA 5.5 5.0 - 8.0    Protein, UA Negative Negative mg/dL    Urobilinogen, Urine 0.2 <2.0 E.U./dL    Nitrite, Urine Negative Negative    Leukocyte Esterase, Urine Negative Negative    Microscopic Examination YES     Urine Type NotGiven     Mucus, UA 1+ (A) None Seen /LPF    WBC, UA 0-2 0 - 5 /HPF    RBC, UA 0-2 0 - 4 /HPF    Epithelial Cells, UA 0-1 0 - 5 /HPF    Bacteria, UA 2+ (A) None Seen /HPF   Blood gas, arterial    Collection Time: 10/17/21 12:47 PM   Result Value Ref Range    pH, Arterial 7.397 7.350 - 7.450    pCO2, Arterial 26.0 (L) 35.0 - 45.0 mmHg    pO2, Arterial 115.0 (H) 75.0 - 108.0 mmHg    HCO3, Arterial 16 (L) 21 - 29 mmol/L    Base Excess, Arterial -7.8 (L) -3.0 - 3.0 mmol/L    Hemoglobin, Art, Extended 8.80 g/dL    O2 Sat, Arterial 99 93 - 100 %    Carboxyhgb, Arterial 0.8 0.0 - 1.5 %    Methemoglobin, Arterial 0.1 0.0 - 1.4 %    TCO2, Arterial 17 mmol/L   Lactic acid, plasma    Collection Time: 10/17/21  2:30 PM   Result Value Ref Range    Lactic Acid 5.9 (HH) 0.4 - 2.0 mmol/L         Follow up: with RONNELL Montano CNP    Note that over 30 minutes was spent in preparing discharge papers, discussing discharge with patient, medication review, etc.    Thank you RONNELL Montano CNP for the opportunity to be involved in this patient's care. If you have any questions or concerns please feel free to contact me at [phone].     Electronically signed by Saji Moreno MD on 10/17/2021 at 6:40 PM

## 2021-10-17 NOTE — CODE DOCUMENTATION
ICU team had been called to a rapid response for patient because she had worsening oxygen requirements. They contacted me for potential need to transfer patient to ICU as her mental status had worsened. Repeat head CT done after a previous rapid response showed that in addition to the large right-sided MCA infarction she had a new occipital PCA distribution infarct. While on the phone with the ICU team patient appeared to go into respiratory arrest and asystole. A code was called. I went to bedside where they were performing CPR. Patient did receive 2 rounds of epinephrine without regaining an adequate pulse. Nursing contacted the family I spoke to the son. He was previously decided as the primary contact to communicate the decisions made by he and his siblings. He indicated that patient was supposed to be a DNR, however in the computer she is listed as full code. Based on that information however I instructed the code team to stop CPR. Patient did not have a pulse or spontaneous respirations so code was called at that time.   Death note will be written by the medical team but approximate time of death was 3 PM.

## 2021-10-17 NOTE — PROGRESS NOTES
Nurse at pt bedside to place welch per MD order. Pt was on 7L sating at 93%. During insertion, pt desat to low 70s. Pt brought up to 15L and non-rebreather mask, sat at 78%. MD paged to come to bedside. Rapid called at 1220.    Electronically signed by Jayme Billings RN on 10/17/2021 at 3:44 PM

## 2021-10-17 NOTE — PROGRESS NOTES
Post-mortem care complete. [facility] and put pt in for transport.    Electronically signed by Billy Argueta RN on 10/17/2021 at 6:57 PM

## 2021-10-17 NOTE — PROGRESS NOTES
Rapid Response: Pt placed on 15L high flow and non-rebreather. RN and Charge RN at bedside. On-call residents to bedside. Placed orders for ABG, EKG, BNP, and lactic acid obtained. Pt now stable on non-rebreather at 95%. Pt now showing right sided flaccid and non-reactive pupils bilaterally. RN and Charge RN accompanied pt down for CT of head. Awaiting results.    Electronically signed by Rodriguez Blackman RN on 10/17/2021

## 2021-10-17 NOTE — PROGRESS NOTES
Code Blue: While working on plans to transfer pt to ICU and obtaining repeat ABG, pt marisol down to 30s and then asystole. When RN returned to bedside to assist RT, pt was not breathing and tele monitor showed asystole. Code Blue called at 6503.    1450: Epinephrine given 1mg per fem line. 1452: pulse check, no pulse noted. 1453: Pt opened eyes, foaming at mouth, suctioned, pulse check, no pulse. 1454: Second dose of Epinephrine given 1mg per fem line. 1456: Pulse check, no pulse  1458: Dr Aliya Paulino on phone with son, CPR stopped. Dr. Jocelyn Hatfield called Time of Death [phone].     4 rounds of CPR, epinephrine x 2.      Electronically signed by Vijaya Dubois RN on 10/17/2021

## 2021-10-17 NOTE — PROGRESS NOTES
Second Rapid Response called at 2:23  because she had worsening oxygen requirements. Pt was on 15L high flow and non-rebreather desat to 60s and sustaining. CT showed new occipital lobe infarct. Plan to transfer pt to ICU for further management.    Electronically signed by Darren Vital RN on 10/17/2021

## 2021-10-18 LAB
BASE EXCESS ARTERIAL: -9 (ref -3–3)
GLUCOSE BLD-MCNC: 124 MG/DL (ref 70–99)
GLUCOSE BLD-MCNC: 148 MG/DL (ref 70–99)
GLUCOSE BLD-MCNC: 163 MG/DL (ref 70–99)
HCO3 ARTERIAL: 16.6 MMOL/L (ref 21–29)
IRON SATURATION: 18 % (ref 15–50)
IRON: 34 UG/DL (ref 37–145)
LACTATE: 5.07 MMOL/L (ref 0.4–2)
O2 SAT, ARTERIAL: 95 % (ref 93–100)
PCO2 ARTERIAL: 31.2 MM HG (ref 35–45)
PERFORMED ON: ABNORMAL
PH ARTERIAL: 7.33 (ref 7.35–7.45)
PO2 ARTERIAL: 77 MM HG (ref 75–108)
POC SAMPLE TYPE: ABNORMAL
TCO2 ARTERIAL: 18 MMOL/L
TOTAL IRON BINDING CAPACITY: 188 UG/DL (ref 260–445)

## 2021-10-18 NOTE — PROGRESS NOTES
Pt transported to [facility].    Electronically signed by Onelia Lam RN on 10/17/2021 at 8:07 PM

## 2021-10-19 LAB
EKG ATRIAL RATE: 85 BPM
EKG DIAGNOSIS: NORMAL
EKG P-R INTERVAL: 142 MS
EKG Q-T INTERVAL: 376 MS
EKG QRS DURATION: 88 MS
EKG QTC CALCULATION (BAZETT): 447 MS
EKG R AXIS: 84 DEGREES
EKG T AXIS: 153 DEGREES
EKG VENTRICULAR RATE: 85 BPM

## 2021-10-31 NOTE — PROGRESS NOTES
RN called report to [facility]. Medical transport at [facility] to  patient at this time. VSS. no fever and no chills.

## 2022-02-17 NOTE — CARE COORDINATION
Case Management Assessment           Daily Note                 Date/ Time of Note: 10/15/2021 3:40 PM         Note completed by: Edie Hoyos RN    Patient Name: Sola Bellamy  YOB: 1960    Diagnosis:Acute CVA (cerebrovascular accident) [facility]) [I63.9]  Patient Admission Status: Inpatient    Date of Admission:10/11/2021  6:35 PM Length of Stay: 4 GLOS: GMLOS: 5.1    Current Plan of Care: restraints, GI consult for PEG placement  ________________________________________________________________________________________  PT AM-PAC: 6 / 24 per last evaluation on: 10/15/21     OT AM-PAC: 6 / 24 per last evaluation on: 10/15/21     DME Needs for discharge: deferred  ________________________________________________________________________________________  Discharge Plan: SNF: [facility]    Tentative discharge date: TBD    Current barriers to discharge: PEG placement, restraint free, medical stability and clearance for DC    Referrals completed: Not Applicable    Resources/ information provided: Not indicated at this time  ________________________________________________________________________________________  Case Management Notes: CM continues to follow for DC planning and needs. Patient remains in restraints to prevent pulling of lines. Patients family discussing PEG vs Hospice and leaning toward PEG placement currently. GI consulted for PEG placement. Plan for DC is SNF at [facility], no pre-cert needed. Hailey Montesinos and her family were provided with choice of provider; she and her family are in agreement with the discharge plan.     Care Transition Patient: Lashaun Hoyos RN  [facility]  Case Management Department  Ph: [phone]  Fax: [phone] General

## 2022-09-20 NOTE — CONSULTS
GI:    Records reviewed in detail and patient was evaluated    Ass:  Oropharyngeal dysphagia post CVA  Hypernatremia  HTN    Plan for peg once electrolyte abnormality is corrected  discussed with RN  Will order covid 19 test    Thank you      mariaa jeff m.d.  10-15-21 Opzelura Counseling:  I discussed with the patient the risks of Opzelura including but not limited to nasopharngitis, bronchitis, ear infection, eosinophila, hives, diarrhea, folliculitis, tonsillitis, and rhinorrhea.  Taken orally, this medication has been linked to serious infections; higher rate of mortality; malignancy and lymphoproliferative disorders; major adverse cardiovascular events; thrombosis; thrombocytopenia, anemia, and neutropenia; and lipid elevations.

## 2023-11-07 NOTE — PROGRESS NOTES
Hospitalist Progress Note      PCP: Delfin Sofia, APRN - CNP    Date of Admission: 10/2/2021    Chief Complaint: vision changes       Subjective:  Nursing says that she has been doing well today. They were impressed by her alertness and mobility. During my visit this afternoon, however, she is fairly drowsy. It is hard to tell how much effort she is putting forth to talk with me and follow commands. The posturing noted at 9 pm last night sounds quite concerning. Will delay discharge for today and plan on tomorrow if she isn't worsening. She walked 3 steps today. PT is still recommending ARU. Will touch base with them so that we don't delay discharge at the last moment. Medications:  Reviewed    Infusion Medications   Scheduled Medications    magnesium sulfate  2,000 mg IntraVENous Once    amLODIPine  10 mg Oral Daily    carvedilol  12.5 mg Oral BID WC    aspirin  81 mg Oral Daily    Or    aspirin  300 mg Rectal Daily    atorvastatin  80 mg Oral Nightly    covid-19 vaccine  1 Dose IntraMUSCular Prior to discharge     PRN Meds: perflutren lipid microspheres, labetalol, HYDROcodone 5 mg - acetaminophen, potassium chloride, magnesium sulfate, promethazine **OR** ondansetron, acetaminophen **OR** acetaminophen, polyethylene glycol, perflutren lipid microspheres      Intake/Output Summary (Last 24 hours) at 10/7/2021 1300  Last data filed at 10/6/2021 1725  Gross per 24 hour   Intake 0 ml   Output 495 ml   Net -495 ml       Physical Exam Performed:    BP (!) 165/83   Pulse 83   Temp 97.8 °F (36.6 °C) (Axillary)   Resp 16   Ht 5' 4\" (1.626 m)   Wt 196 lb 6.9 oz (89.1 kg)   SpO2 98%   BMI 33.72 kg/m²     General appearance: No apparent distress, appears stated age. HEENT: Pupils equal, round, and reactive to light. Conjunctivae/corneas clear. Neck: Supple, with full range of motion. No jugular venous distention. Trachea midline. Respiratory:  Normal respiratory effort.  Clear to auscultation, bilaterally without Rales/Wheezes/Rhonchi. Cardiovascular: Regular rate and rhythm with normal S1/S2 without murmurs, rubs or gallops. Abdomen: Soft, non-tender, non-distended with normal bowel sounds. Musculoskeletal: No clubbing, cyanosis or edema bilaterally. Full range of motion without deformity. Skin: Skin color, texture, turgor normal.  No rashes or lesions. Neurologic:  She still has the L visual field defect. She can barely lift her L hand off the mattress. Says she has normal sensation. L facial droop. R gaze preference. Psychiatric: drowsy, mostly oriented, thought content appropriate, has insight. She does seem to have some cognitive impairment. Capillary Refill: Brisk,3 seconds, normal   Peripheral Pulses: +2 palpable, equal bilaterally       Labs:   Recent Labs     10/07/21  0619   WBC 14.6*   HGB 12.0   HCT 35.4*        Recent Labs     10/05/21  2247 10/07/21  0619   * 134*   K 4.7 4.1   CL 97* 102   CO2 23 18*   BUN 30* 43*   CREATININE 0.9 0.7   CALCIUM 9.7 9.4     No results for input(s): AST, ALT, BILIDIR, BILITOT, ALKPHOS in the last 72 hours. No results for input(s): INR in the last 72 hours. No results for input(s): Adonica Hoose in the last 72 hours. Urinalysis:      Lab Results   Component Value Date    NITRU Negative 10/06/2021    WBCUA 6-9 10/06/2021    BACTERIA Rare 10/06/2021    RBCUA 5-10 10/06/2021    BLOODU Negative 10/06/2021    SPECGRAV 1.020 10/06/2021    GLUCOSEU Negative 10/06/2021       Radiology:  CT HEAD WO CONTRAST   Final Result   Aging or evolving infarct on the right, with slight increase in midline shift   to the left. No obvious hemorrhage noted. Nodularity posterior nasopharynx, similar to prior. MRI BRAIN WO CONTRAST   Preliminary Result   Unchanged subacute right MCA infarct. XR CHEST PORTABLE   Final Result   No acute cardiopulmonary abnormality.          CTA HEAD NECK W CONTRAST   Final Result 1.  Occlusion of the right middle cerebral artery at the origin may be acute. Overall decreased size and number of enhancing right middle cerebral artery   branches in comparison to the left. This correlates with findings of large   acute/subacute right middle cerebral artery territory infarct. 2.  Short segment high-grade stenosis of a left middle cerebral artery M2   branch. Mild-to-moderate stenosis in the left middle cerebral artery M1   segment. Diffuse atherosclerotic irregularity of the more distal left middle   cerebral artery branches. 3.  The bilateral posterior cerebral arteries are diffusely small and not   well seen, underlying disease is not excluded. 4.  No hemodynamically significant stenosis in the bilateral cervical   internal carotid arteries per NASCET criteria. Bilateral carotid bulb   atherosclerotic plaque. 5.  Patent bilateral vertebral arteries. Critical results were called by Dr. Anthony Fiore to Dr. Caleb Reza on   10/2/2021 at 19:49. This scan was analyzed using [employer]. [employer] contact LVO. Identification of suspected   findings is not for diagnostic use beyond notification. Viz LVO is limited to   analysis of imaging data and should not be used in-lieu of full patient   evaluation or relied upon to make or confirm diagnosis. CT HEAD WO CONTRAST   Final Result   1. Large acute/subacute stroke in the right middle cerebral artery   territory. No hemorrhagic conversion. 2.  Sulcal effacement throughout the right cerebral hemisphere with partial   effacement of the right lateral and 3rd ventricles and minimal right left   midline shift measuring 2 mm. 3.  Chronic small vessel ischemic disease. Old left thalamic and basal   ganglia lacunar infarcts are similar to the prior study. Critical results were called by Dr. Anthony Fiore to Dr. Caleb Reza on   10/2/2021 at 19:29.                  Assessment/Plan:    MENDOZA/Stacey Robert 1106 Problems    Diagnosis     Peripheral visual field defect, left [H53.452]     Acute ischemic stroke ([facility] [address] [I63.9]     Hypertensive emergency [I16.1]        64 Y F with a h/o smoking, obesity, HTN, and HLD presented with one day of left-sided vision changes, dropping things with her left hand, and LUE paresthesias.          Large RMCA subacute ischemic stroke  - probably thromboembolic.  Her CTA showed an occlusion of the origin of the RMCA, also significant stenoses of the LMCA. - aspirin, statin  - PT/OT  - repeat head CT did show that as the infarct evolved there was some mass effect on the R lateral ventricle and the midline shift had gone from 2-3 mm to 5 mm. No seizure activity during her syncopal spells. - appreciate neurology input     Severe HTN  - eventual BP control.  OK to let it gradually come down over the next few days in light of her stroke and syncopal spells.    - her amlodipine and carvedilol were resumed over the weekend.  BP stayed very high.  Added chlorthalidone, but then her Na decreased, BUN increased, and she had a syncopal spell, so this was stopped overnight. Could start low-dose losartan in the near future.     Syncopal episodes on 10/5 and 10/6. Probably vasovagal since both episodes occurred on the toilet. No events on tele. - will get another TTE to re-evaluate LV function     HLD.  Statin.     Obesity.  Encouraged diet and exercise as able. DVT Prophylaxis: enoxaparin  Diet: ADULT DIET; Dysphagia - Pureed; 3 carb choices (45 gm/meal); Low Fat/Low Chol/High Fiber/2 gm Na  Code Status: Full Code    PT/OT Eval Status: rec'd IPR    Dispo - suspect that she will be ready for the ARU on 10/8 if she doesn't seem to be worsening.        Lelia Poe MD